# Patient Record
Sex: MALE | Race: WHITE | Employment: FULL TIME | URBAN - NONMETROPOLITAN AREA
[De-identification: names, ages, dates, MRNs, and addresses within clinical notes are randomized per-mention and may not be internally consistent; named-entity substitution may affect disease eponyms.]

---

## 2017-01-18 RX ORDER — HYDROCODONE BITARTRATE AND ACETAMINOPHEN 10; 325 MG/1; MG/1
1 TABLET ORAL EVERY 8 HOURS PRN
Qty: 90 TABLET | Refills: 0 | Status: SHIPPED | OUTPATIENT
Start: 2017-01-19 | End: 2017-02-17 | Stop reason: SDUPTHER

## 2017-02-17 ENCOUNTER — HOSPITAL ENCOUNTER (OUTPATIENT)
Dept: PAIN MANAGEMENT | Age: 52
Discharge: HOME | End: 2017-02-17
Payer: MEDICAID

## 2017-02-17 VITALS
HEIGHT: 68 IN | SYSTOLIC BLOOD PRESSURE: 150 MMHG | WEIGHT: 153 LBS | DIASTOLIC BLOOD PRESSURE: 81 MMHG | HEART RATE: 80 BPM | RESPIRATION RATE: 16 BRPM | TEMPERATURE: 98.1 F | BODY MASS INDEX: 23.19 KG/M2 | OXYGEN SATURATION: 97 %

## 2017-02-17 PROCEDURE — 99213 OFFICE O/P EST LOW 20 MIN: CPT

## 2017-02-17 PROCEDURE — 80307 DRUG TEST PRSMV CHEM ANLYZR: CPT

## 2017-02-17 RX ORDER — HYDROCODONE BITARTRATE AND ACETAMINOPHEN 10; 325 MG/1; MG/1
1 TABLET ORAL EVERY 8 HOURS PRN
Qty: 90 TABLET | Refills: 0 | Status: SHIPPED | OUTPATIENT
Start: 2017-02-18 | End: 2017-03-15 | Stop reason: SDUPTHER

## 2017-02-17 ASSESSMENT — PAIN DESCRIPTION - PAIN TYPE: TYPE: CHRONIC PAIN

## 2017-02-17 ASSESSMENT — PAIN DESCRIPTION - DESCRIPTORS: DESCRIPTORS: ACHING;CONSTANT;NUMBNESS;TINGLING

## 2017-02-17 ASSESSMENT — PAIN DESCRIPTION - PROGRESSION: CLINICAL_PROGRESSION: NOT CHANGED

## 2017-02-17 ASSESSMENT — ACTIVITIES OF DAILY LIVING (ADL): EFFECT OF PAIN ON DAILY ACTIVITIES: DAILY ACTIVITIES

## 2017-02-17 ASSESSMENT — PAIN DESCRIPTION - FREQUENCY: FREQUENCY: CONTINUOUS

## 2017-02-17 ASSESSMENT — PAIN DESCRIPTION - ONSET: ONSET: ON-GOING

## 2017-02-17 ASSESSMENT — PAIN DESCRIPTION - LOCATION: LOCATION: BACK;LEG

## 2017-02-17 ASSESSMENT — PAIN DESCRIPTION - DIRECTION: RADIATING_TOWARDS: RADIATES DOWN BILATERAL LEGS

## 2017-02-17 ASSESSMENT — PAIN DESCRIPTION - ORIENTATION: ORIENTATION: RIGHT;LEFT;LOWER

## 2017-02-17 ASSESSMENT — PAIN SCALES - GENERAL: PAINLEVEL_OUTOF10: 6

## 2017-03-08 LAB
AMPHETAMINES, URINE: NEGATIVE NG/ML
BARBITURATES, URINE: NEGATIVE NG/ML
BENZODIAZEPINES, URINE: NEGATIVE NG/ML
CANNABINOIDS, URINE: NEGATIVE NG/ML
COCAINE METABOLITE, URINE: NEGATIVE NG/ML
CODEINE, URINE CONFIRM: >3000 NG/ML
CODEINE, URINE: POSITIVE
CREATININE, URINE: 52.4 MG/DL (ref 20–300)
ETHANOL U, QUAN: NEGATIVE %
FENTANYL URINE: NEGATIVE ML
HYDROCODONE, URINE: NEGATIVE
HYDROMORPHONE, URINE: NEGATIVE
MEPERIDINE, UR: NEGATIVE NG/ML
METHADONE SCREEN, URINE: NEGATIVE NG/ML
MORPHINE URINE: POSITIVE
MORPHINE, URINE CONFIRMATION: 915 NG/ML
OPIATES, URINE: ABNORMAL NG/ML
OPIATES, URINE: POSITIVE NG/ML
OXYCODONE/OXYMORPHONE, UR: NEGATIVE NG/ML
PH, URINE: 5.9 (ref 4.5–8.9)
PHENCYCLIDINE, URINE: NEGATIVE NG/ML
PROPOXYPHENE, URINE: NEGATIVE NG/ML

## 2017-03-16 RX ORDER — HYDROCODONE BITARTRATE AND ACETAMINOPHEN 10; 325 MG/1; MG/1
1 TABLET ORAL EVERY 8 HOURS PRN
Qty: 90 TABLET | Refills: 0 | Status: SHIPPED | OUTPATIENT
Start: 2017-03-20 | End: 2017-04-22

## 2017-03-29 ENCOUNTER — OFFICE VISIT (OUTPATIENT)
Dept: FAMILY MEDICINE CLINIC | Facility: CLINIC | Age: 52
End: 2017-03-29

## 2017-03-29 VITALS
HEART RATE: 75 BPM | SYSTOLIC BLOOD PRESSURE: 128 MMHG | WEIGHT: 152 LBS | HEIGHT: 68 IN | RESPIRATION RATE: 16 BRPM | OXYGEN SATURATION: 97 % | DIASTOLIC BLOOD PRESSURE: 80 MMHG | BODY MASS INDEX: 23.04 KG/M2

## 2017-03-29 DIAGNOSIS — R13.10 DYSPHAGIA, UNSPECIFIED TYPE: ICD-10-CM

## 2017-03-29 DIAGNOSIS — K21.00 GASTROESOPHAGEAL REFLUX DISEASE WITH ESOPHAGITIS: Primary | ICD-10-CM

## 2017-03-29 PROCEDURE — 99213 OFFICE O/P EST LOW 20 MIN: CPT | Performed by: FAMILY MEDICINE

## 2017-03-29 RX ORDER — HYDROCODONE BITARTRATE AND ACETAMINOPHEN 10; 325 MG/1; MG/1
TABLET ORAL EVERY 8 HOURS
COMMUNITY
Start: 2017-03-20 | End: 2017-04-21

## 2017-03-29 RX ORDER — LANSOPRAZOLE 30 MG/1
CAPSULE, DELAYED RELEASE ORAL
COMMUNITY
End: 2017-03-29 | Stop reason: CLARIF

## 2017-03-29 RX ORDER — PANTOPRAZOLE SODIUM 40 MG/1
40 TABLET, DELAYED RELEASE ORAL DAILY
Qty: 30 TABLET | Refills: 2 | OUTPATIENT
Start: 2017-03-29 | End: 2017-10-12

## 2017-03-29 NOTE — PROGRESS NOTES
"Ashley Myers is a 51 y.o. male.     Chief Complaint   Patient presents with   • Heartburn     x 3 weeks       History of Present Illness     he notes heartburn with difficulty swallowing ..he does remember having endo wtih dilatiation in the past at [facility]..he is noting occ dysphagia--denies any anginal symptoms or dsypnea      Current Outpatient Prescriptions:   •  HYDROcodone-acetaminophen (NORCO)  MG per tablet, Take  by mouth Every 8 (Eight) Hours., Disp: , Rfl:   •  pantoprazole (PROTONIX) 40 MG EC tablet, Take 1 tablet by mouth Daily., Disp: 30 tablet, Rfl: 2  No Known Allergies    No past medical history on file.  No past surgical history on file.    Review of Systems   Constitutional: Negative.    HENT: Negative.    Eyes: Negative.    Respiratory: Negative.    Cardiovascular: Negative.    Gastrointestinal: Positive for abdominal pain.   Endocrine: Negative.    Genitourinary: Negative.    Musculoskeletal: Negative.    Skin: Negative.    Allergic/Immunologic: Negative.    Neurological: Negative.    Psychiatric/Behavioral: Negative.        Objective  /80  Pulse 75  Resp 16  Ht 68\" (172.7 cm)  Wt 152 lb (68.9 kg)  SpO2 97%  BMI 23.11 kg/m2  Physical Exam   Constitutional: He is oriented to person, place, and time. He appears well-developed and well-nourished.   HENT:   Head: Normocephalic and atraumatic.   Right Ear: External ear normal.   Left Ear: External ear normal.   Nose: Nose normal.   Mouth/Throat: Oropharynx is clear and moist.   Eyes: Conjunctivae and EOM are normal. Pupils are equal, round, and reactive to light.   Neck: Normal range of motion. Neck supple.   Cardiovascular: Normal rate, regular rhythm, normal heart sounds and intact distal pulses.    Pulmonary/Chest: Effort normal and breath sounds normal.   Abdominal: Soft. Bowel sounds are normal.   Musculoskeletal: Normal range of motion.   Neurological: He is alert and oriented to person, place, and time. He has " normal reflexes.   Skin: Skin is warm and dry.   Psychiatric: He has a normal mood and affect. His behavior is normal. Judgment and thought content normal.   Nursing note and vitals reviewed.      Assessment/Plan   Magalie was seen today for heartburn.    Diagnoses and all orders for this visit:    Gastroesophageal reflux disease with esophagitis  -     Ambulatory Referral to Gastroenterology    Dysphagia, unspecified type  -     Ambulatory Referral to Gastroenterology    Other orders  -     pantoprazole (PROTONIX) 40 MG EC tablet; Take 1 tablet by mouth Daily.          [facility]       Orders Placed This Encounter   Procedures   • Ambulatory Referral to Gastroenterology       Follow up:prn

## 2017-04-11 ENCOUNTER — TELEPHONE (OUTPATIENT)
Dept: PAIN MANAGEMENT | Age: 52
End: 2017-04-11

## 2017-04-15 ENCOUNTER — OFFICE VISIT (OUTPATIENT)
Dept: RETAIL CLINIC | Facility: CLINIC | Age: 52
End: 2017-04-15

## 2017-04-15 DIAGNOSIS — Z02.83 ENCOUNTER FOR DRUG SCREENING: Primary | ICD-10-CM

## 2017-04-15 NOTE — PROGRESS NOTES
Magalie Myers is a 51 y.o. male who presents to the clinic today for e-screen urine drug screen. See scanned [facility].

## 2017-04-21 ENCOUNTER — OFFICE VISIT (OUTPATIENT)
Dept: FAMILY MEDICINE CLINIC | Facility: CLINIC | Age: 52
End: 2017-04-21

## 2017-04-21 VITALS
OXYGEN SATURATION: 97 % | HEART RATE: 87 BPM | DIASTOLIC BLOOD PRESSURE: 82 MMHG | BODY MASS INDEX: 23.49 KG/M2 | WEIGHT: 155 LBS | SYSTOLIC BLOOD PRESSURE: 116 MMHG | HEIGHT: 68 IN | RESPIRATION RATE: 16 BRPM

## 2017-04-21 DIAGNOSIS — M54.31 SCIATICA OF RIGHT SIDE: ICD-10-CM

## 2017-04-21 DIAGNOSIS — M54.41 CHRONIC RIGHT-SIDED LOW BACK PAIN WITH RIGHT-SIDED SCIATICA: Primary | ICD-10-CM

## 2017-04-21 DIAGNOSIS — G89.29 CHRONIC RIGHT-SIDED LOW BACK PAIN WITH RIGHT-SIDED SCIATICA: Primary | ICD-10-CM

## 2017-04-21 PROBLEM — M54.40 CHRONIC RIGHT-SIDED LOW BACK PAIN WITH SCIATICA: Status: ACTIVE | Noted: 2017-04-21

## 2017-04-21 PROCEDURE — 99213 OFFICE O/P EST LOW 20 MIN: CPT | Performed by: FAMILY MEDICINE

## 2017-04-21 NOTE — PROGRESS NOTES
"Ashley Myers is a 51 y.o. male.     Chief Complaint   Patient presents with   • Back Pain     chronic back pain        History of Present Illness     Neisha has chronic low back pain and goes to dr hopson for pain managment for about 3 years...however in the 2-3mos his pain intensified and he would like to get surgical opinion..he notes pain going down the right leg      Current Outpatient Prescriptions:   •  pantoprazole (PROTONIX) 40 MG EC tablet, Take 1 tablet by mouth Daily., Disp: 30 tablet, Rfl: 2  No Known Allergies    No past medical history on file.  No past surgical history on file.    Review of Systems   Constitutional: Negative.    HENT: Negative.    Eyes: Negative.    Respiratory: Negative.    Cardiovascular: Negative.    Gastrointestinal: Negative.    Endocrine: Negative.    Genitourinary: Negative.    Musculoskeletal: Positive for back pain.   Skin: Negative.    Allergic/Immunologic: Negative.    Neurological: Negative.    Hematological: Negative.    Psychiatric/Behavioral: Negative.        Objective  /82  Pulse 87  Resp 16  Ht 68\" (172.7 cm)  Wt 155 lb (70.3 kg)  SpO2 97%  BMI 23.57 kg/m2  Physical Exam   Constitutional: He is oriented to person, place, and time. He appears well-developed and well-nourished.   HENT:   Head: Normocephalic and atraumatic.   Right Ear: External ear normal.   Left Ear: External ear normal.   Nose: Nose normal.   Mouth/Throat: Oropharynx is clear and moist.   Eyes: Conjunctivae and EOM are normal. Pupils are equal, round, and reactive to light.   Neck: Normal range of motion. Neck supple.   Cardiovascular: Normal rate, regular rhythm, normal heart sounds and intact distal pulses.    Pulmonary/Chest: Effort normal and breath sounds normal.   Abdominal: Soft. Bowel sounds are normal.   Musculoskeletal: Normal range of motion.   Neurological: He is alert and oriented to person, place, and time. He has normal reflexes.   Skin: Skin is warm and dry. "   Psychiatric: He has a normal mood and affect. His behavior is normal. Judgment and thought content normal.   Nursing note and vitals reviewed.      Assessment/Plan   Magalie was seen today for back pain.    Diagnoses and all orders for this visit:    Chronic right-sided low back pain with right-sided sciatica  -     Ambulatory Referral to Neurosurgery    Sciatica of right side  -     Ambulatory Referral to Neurosurgery      Continue with pain management for now           No orders of the defined types were placed in this encounter.      Follow up: prn

## 2017-04-22 ENCOUNTER — HOSPITAL ENCOUNTER (EMERGENCY)
Age: 52
Discharge: HOME | End: 2017-04-22
Payer: MEDICAID

## 2017-04-22 VITALS
OXYGEN SATURATION: 97 % | SYSTOLIC BLOOD PRESSURE: 149 MMHG | BODY MASS INDEX: 23.49 KG/M2 | HEART RATE: 78 BPM | HEIGHT: 68 IN | DIASTOLIC BLOOD PRESSURE: 100 MMHG | WEIGHT: 155 LBS | TEMPERATURE: 98.2 F | RESPIRATION RATE: 20 BRPM

## 2017-04-22 DIAGNOSIS — G89.29 CHRONIC BILATERAL LOW BACK PAIN WITH RIGHT-SIDED SCIATICA: Primary | ICD-10-CM

## 2017-04-22 DIAGNOSIS — M54.41 CHRONIC BILATERAL LOW BACK PAIN WITH RIGHT-SIDED SCIATICA: Primary | ICD-10-CM

## 2017-04-22 PROCEDURE — 99282 EMERGENCY DEPT VISIT SF MDM: CPT | Performed by: PHYSICIAN ASSISTANT

## 2017-04-22 PROCEDURE — 99282 EMERGENCY DEPT VISIT SF MDM: CPT

## 2017-04-22 RX ORDER — CYCLOBENZAPRINE HCL 10 MG
10 TABLET ORAL 3 TIMES DAILY PRN
Qty: 15 TABLET | Refills: 0 | Status: SHIPPED | OUTPATIENT
Start: 2017-04-22 | End: 2017-05-01

## 2017-04-22 RX ORDER — HYDROCODONE BITARTRATE AND ACETAMINOPHEN 5; 325 MG/1; MG/1
1 TABLET ORAL EVERY 6 HOURS PRN
Qty: 12 TABLET | Refills: 0 | Status: SHIPPED | OUTPATIENT
Start: 2017-04-22 | End: 2017-04-29

## 2017-04-22 RX ORDER — NAPROXEN 500 MG/1
500 TABLET ORAL 2 TIMES DAILY
Qty: 20 TABLET | Refills: 0 | Status: SHIPPED | OUTPATIENT
Start: 2017-04-22 | End: 2017-05-12 | Stop reason: ALTCHOICE

## 2017-04-22 RX ORDER — METHYLPREDNISOLONE 4 MG/1
TABLET ORAL
Qty: 1 KIT | Refills: 0 | Status: SHIPPED | OUTPATIENT
Start: 2017-04-22 | End: 2017-04-28

## 2017-04-22 ASSESSMENT — PAIN DESCRIPTION - PAIN TYPE: TYPE: CHRONIC PAIN

## 2017-04-22 ASSESSMENT — PAIN DESCRIPTION - LOCATION: LOCATION: BACK;OTHER (COMMENT)

## 2017-04-22 ASSESSMENT — PAIN SCALES - GENERAL: PAINLEVEL_OUTOF10: 8

## 2017-04-22 ASSESSMENT — PAIN DESCRIPTION - FREQUENCY: FREQUENCY: CONTINUOUS

## 2017-04-22 ASSESSMENT — PAIN DESCRIPTION - DESCRIPTORS: DESCRIPTORS: SHARP;SHOOTING

## 2017-04-24 ASSESSMENT — ENCOUNTER SYMPTOMS
DIARRHEA: 0
SHORTNESS OF BREATH: 0
NAUSEA: 0
BACK PAIN: 1
ABDOMINAL PAIN: 0
COUGH: 0
CONSTIPATION: 0
VOMITING: 0
WHEEZING: 0

## 2017-04-28 ENCOUNTER — TELEPHONE (OUTPATIENT)
Dept: PAIN MANAGEMENT | Age: 52
End: 2017-04-28

## 2017-05-01 ENCOUNTER — HOSPITAL ENCOUNTER (EMERGENCY)
Age: 52
Discharge: HOME | End: 2017-05-01
Payer: MEDICAID

## 2017-05-01 ENCOUNTER — APPOINTMENT (OUTPATIENT)
Dept: GENERAL RADIOLOGY | Age: 52
End: 2017-05-01
Payer: MEDICAID

## 2017-05-01 VITALS
HEART RATE: 73 BPM | RESPIRATION RATE: 18 BRPM | OXYGEN SATURATION: 99 % | DIASTOLIC BLOOD PRESSURE: 93 MMHG | WEIGHT: 155 LBS | BODY MASS INDEX: 23.49 KG/M2 | SYSTOLIC BLOOD PRESSURE: 144 MMHG | HEIGHT: 68 IN | TEMPERATURE: 98.6 F

## 2017-05-01 DIAGNOSIS — M54.41 BILATERAL LOW BACK PAIN WITH RIGHT-SIDED SCIATICA, UNSPECIFIED CHRONICITY: Primary | ICD-10-CM

## 2017-05-01 PROCEDURE — 72100 X-RAY EXAM L-S SPINE 2/3 VWS: CPT

## 2017-05-01 PROCEDURE — 96372 THER/PROPH/DIAG INJ SC/IM: CPT

## 2017-05-01 PROCEDURE — 6360000002 HC RX W HCPCS: Performed by: PHYSICIAN ASSISTANT

## 2017-05-01 PROCEDURE — 99283 EMERGENCY DEPT VISIT LOW MDM: CPT

## 2017-05-01 PROCEDURE — 99282 EMERGENCY DEPT VISIT SF MDM: CPT | Performed by: PHYSICIAN ASSISTANT

## 2017-05-01 RX ORDER — METHYLPREDNISOLONE SODIUM SUCCINATE 125 MG/2ML
125 INJECTION, POWDER, LYOPHILIZED, FOR SOLUTION INTRAMUSCULAR; INTRAVENOUS ONCE
Status: COMPLETED | OUTPATIENT
Start: 2017-05-01 | End: 2017-05-01

## 2017-05-01 RX ORDER — ORPHENADRINE CITRATE 30 MG/ML
60 INJECTION INTRAMUSCULAR; INTRAVENOUS ONCE
Status: COMPLETED | OUTPATIENT
Start: 2017-05-01 | End: 2017-05-01

## 2017-05-01 RX ORDER — CYCLOBENZAPRINE HCL 10 MG
10 TABLET ORAL 3 TIMES DAILY PRN
Qty: 15 TABLET | Refills: 0 | Status: SHIPPED | OUTPATIENT
Start: 2017-05-01 | End: 2017-05-11

## 2017-05-01 RX ORDER — HYDROCODONE BITARTRATE AND ACETAMINOPHEN 10; 325 MG/1; MG/1
1 TABLET ORAL 3 TIMES DAILY
Qty: 20 TABLET | Refills: 0 | Status: SHIPPED | OUTPATIENT
Start: 2017-05-01 | End: 2017-05-18

## 2017-05-01 RX ORDER — METHYLPREDNISOLONE 4 MG/1
TABLET ORAL
Qty: 1 KIT | Refills: 0 | Status: SHIPPED | OUTPATIENT
Start: 2017-05-01 | End: 2017-05-12 | Stop reason: ALTCHOICE

## 2017-05-01 RX ADMIN — ORPHENADRINE CITRATE 60 MG: 30 INJECTION INTRAMUSCULAR; INTRAVENOUS at 11:05

## 2017-05-01 RX ADMIN — METHYLPREDNISOLONE SODIUM SUCCINATE 125 MG: 125 INJECTION, POWDER, FOR SOLUTION INTRAMUSCULAR; INTRAVENOUS at 11:05

## 2017-05-01 ASSESSMENT — PAIN DESCRIPTION - LOCATION
LOCATION: BACK
LOCATION: BACK

## 2017-05-01 ASSESSMENT — PAIN SCALES - GENERAL
PAINLEVEL_OUTOF10: 8
PAINLEVEL_OUTOF10: 9

## 2017-05-01 ASSESSMENT — PAIN DESCRIPTION - PAIN TYPE: TYPE: ACUTE PAIN

## 2017-05-12 ENCOUNTER — HOSPITAL ENCOUNTER (EMERGENCY)
Age: 52
Discharge: HOME | End: 2017-05-12
Payer: MEDICAID

## 2017-05-12 VITALS
BODY MASS INDEX: 23.49 KG/M2 | HEART RATE: 72 BPM | HEIGHT: 68 IN | DIASTOLIC BLOOD PRESSURE: 78 MMHG | SYSTOLIC BLOOD PRESSURE: 138 MMHG | OXYGEN SATURATION: 99 % | RESPIRATION RATE: 20 BRPM | TEMPERATURE: 97 F | WEIGHT: 155 LBS

## 2017-05-12 DIAGNOSIS — G89.29 CHRONIC BACK PAIN GREATER THAN 3 MONTHS DURATION: Primary | ICD-10-CM

## 2017-05-12 DIAGNOSIS — M54.9 CHRONIC BACK PAIN GREATER THAN 3 MONTHS DURATION: Primary | ICD-10-CM

## 2017-05-12 PROCEDURE — 99282 EMERGENCY DEPT VISIT SF MDM: CPT | Performed by: NURSE PRACTITIONER

## 2017-05-12 PROCEDURE — 99282 EMERGENCY DEPT VISIT SF MDM: CPT

## 2017-05-12 RX ORDER — CYCLOBENZAPRINE HCL 10 MG
10 TABLET ORAL 3 TIMES DAILY PRN
Qty: 30 TABLET | Refills: 0 | Status: SHIPPED | OUTPATIENT
Start: 2017-05-12 | End: 2017-05-22

## 2017-05-12 RX ORDER — HYDROCODONE BITARTRATE AND ACETAMINOPHEN 5; 325 MG/1; MG/1
1 TABLET ORAL EVERY 6 HOURS PRN
Qty: 15 TABLET | Refills: 0 | Status: SHIPPED | OUTPATIENT
Start: 2017-05-12 | End: 2017-05-19

## 2017-05-12 RX ORDER — NAPROXEN 500 MG/1
500 TABLET ORAL 2 TIMES DAILY
Qty: 20 TABLET | Refills: 0 | Status: SHIPPED | OUTPATIENT
Start: 2017-05-12 | End: 2017-11-10

## 2017-05-12 RX ORDER — DEXTROSE MONOHYDRATE 25 G/50ML
INJECTION, SOLUTION INTRAVENOUS
Status: DISCONTINUED
Start: 2017-05-12 | End: 2017-05-12 | Stop reason: HOSPADM

## 2017-05-12 ASSESSMENT — PAIN SCALES - GENERAL: PAINLEVEL_OUTOF10: 9

## 2017-05-14 ASSESSMENT — ENCOUNTER SYMPTOMS: BACK PAIN: 1

## 2017-05-15 ENCOUNTER — HOSPITAL ENCOUNTER (EMERGENCY)
Facility: HOSPITAL | Age: 52
Discharge: HOME | End: 2017-05-15
Attending: EMERGENCY MEDICINE | Admitting: EMERGENCY MEDICINE

## 2017-05-15 VITALS
TEMPERATURE: 98 F | DIASTOLIC BLOOD PRESSURE: 85 MMHG | WEIGHT: 154.8 LBS | HEART RATE: 72 BPM | RESPIRATION RATE: 18 BRPM | SYSTOLIC BLOOD PRESSURE: 143 MMHG | BODY MASS INDEX: 23.46 KG/M2 | HEIGHT: 68 IN | OXYGEN SATURATION: 100 %

## 2017-05-15 DIAGNOSIS — G89.29 CHRONIC LOW BACK PAIN WITHOUT SCIATICA, UNSPECIFIED BACK PAIN LATERALITY: Primary | ICD-10-CM

## 2017-05-15 DIAGNOSIS — M54.50 CHRONIC LOW BACK PAIN WITHOUT SCIATICA, UNSPECIFIED BACK PAIN LATERALITY: Primary | ICD-10-CM

## 2017-05-15 PROCEDURE — 99282 EMERGENCY DEPT VISIT SF MDM: CPT

## 2017-05-15 RX ORDER — INDOMETHACIN 50 MG/1
50 CAPSULE ORAL
Qty: 30 CAPSULE | Refills: 0 | Status: SHIPPED | OUTPATIENT
Start: 2017-05-15 | End: 2017-05-18

## 2017-05-15 RX ORDER — METHYLPREDNISOLONE 4 MG/1
TABLET ORAL
Qty: 21 TABLET | Refills: 0 | OUTPATIENT
Start: 2017-05-15 | End: 2017-10-12

## 2017-05-18 ENCOUNTER — OFFICE VISIT (OUTPATIENT)
Dept: NEUROSURGERY | Facility: CLINIC | Age: 52
End: 2017-05-18

## 2017-05-18 VITALS
DIASTOLIC BLOOD PRESSURE: 100 MMHG | SYSTOLIC BLOOD PRESSURE: 158 MMHG | WEIGHT: 155 LBS | BODY MASS INDEX: 23.49 KG/M2 | HEIGHT: 68 IN

## 2017-05-18 DIAGNOSIS — IMO0001 NORMAL BODY MASS INDEX (BMI): ICD-10-CM

## 2017-05-18 DIAGNOSIS — M51.37 DEGENERATION OF LUMBAR OR LUMBOSACRAL INTERVERTEBRAL DISC: Primary | ICD-10-CM

## 2017-05-18 DIAGNOSIS — F17.200 SMOKER: ICD-10-CM

## 2017-05-18 PROBLEM — M51.379 DEGENERATION OF LUMBAR OR LUMBOSACRAL INTERVERTEBRAL DISC: Status: ACTIVE | Noted: 2017-05-18

## 2017-05-18 PROCEDURE — 99203 OFFICE O/P NEW LOW 30 MIN: CPT | Performed by: NURSE PRACTITIONER

## 2017-05-19 RX ORDER — OXYCODONE HYDROCHLORIDE AND ACETAMINOPHEN 5; 325 MG/1; MG/1
1 TABLET ORAL EVERY 8 HOURS PRN
Qty: 90 TABLET | Refills: 0 | Status: SHIPPED | OUTPATIENT
Start: 2017-05-19 | End: 2017-06-19

## 2017-05-22 RX ORDER — HYDROCODONE BITARTRATE AND ACETAMINOPHEN 7.5; 325 MG/1; MG/1
1 TABLET ORAL EVERY 6 HOURS PRN
Qty: 28 TABLET | Refills: 0 | Status: SHIPPED | OUTPATIENT
Start: 2017-05-22 | End: 2017-05-31 | Stop reason: SDUPTHER

## 2017-05-31 ENCOUNTER — HOSPITAL ENCOUNTER (EMERGENCY)
Age: 52
Discharge: HOME | End: 2017-05-31
Payer: MEDICAID

## 2017-05-31 VITALS
DIASTOLIC BLOOD PRESSURE: 91 MMHG | WEIGHT: 155 LBS | SYSTOLIC BLOOD PRESSURE: 139 MMHG | OXYGEN SATURATION: 99 % | TEMPERATURE: 98.2 F | BODY MASS INDEX: 23.49 KG/M2 | HEIGHT: 68 IN | HEART RATE: 81 BPM | RESPIRATION RATE: 18 BRPM

## 2017-05-31 DIAGNOSIS — Z76.0 ENCOUNTER FOR MEDICATION REFILL: ICD-10-CM

## 2017-05-31 DIAGNOSIS — G89.29 CHRONIC BACK PAIN GREATER THAN 3 MONTHS DURATION: Primary | ICD-10-CM

## 2017-05-31 DIAGNOSIS — M54.9 CHRONIC BACK PAIN GREATER THAN 3 MONTHS DURATION: Primary | ICD-10-CM

## 2017-05-31 PROCEDURE — 99282 EMERGENCY DEPT VISIT SF MDM: CPT | Performed by: NURSE PRACTITIONER

## 2017-05-31 PROCEDURE — 99282 EMERGENCY DEPT VISIT SF MDM: CPT

## 2017-05-31 RX ORDER — HYDROCODONE BITARTRATE AND ACETAMINOPHEN 7.5; 325 MG/1; MG/1
1 TABLET ORAL EVERY 8 HOURS PRN
Qty: 21 TABLET | Refills: 0 | Status: SHIPPED | OUTPATIENT
Start: 2017-05-31 | End: 2017-06-06 | Stop reason: SDUPTHER

## 2017-05-31 ASSESSMENT — PAIN DESCRIPTION - DESCRIPTORS: DESCRIPTORS: ACHING

## 2017-05-31 ASSESSMENT — PAIN SCALES - GENERAL: PAINLEVEL_OUTOF10: 9

## 2017-05-31 ASSESSMENT — PAIN DESCRIPTION - LOCATION: LOCATION: BACK

## 2017-05-31 ASSESSMENT — ENCOUNTER SYMPTOMS: BACK PAIN: 1

## 2017-05-31 ASSESSMENT — PAIN DESCRIPTION - PAIN TYPE: TYPE: ACUTE PAIN

## 2017-05-31 ASSESSMENT — PAIN DESCRIPTION - ORIENTATION: ORIENTATION: LOWER

## 2017-06-06 RX ORDER — HYDROCODONE BITARTRATE AND ACETAMINOPHEN 7.5; 325 MG/1; MG/1
1 TABLET ORAL EVERY 8 HOURS PRN
Qty: 21 TABLET | Refills: 0 | Status: SHIPPED | OUTPATIENT
Start: 2017-06-06 | End: 2017-06-13 | Stop reason: SDUPTHER

## 2017-06-08 ENCOUNTER — LAB (OUTPATIENT)
Dept: FAMILY MEDICINE CLINIC | Facility: CLINIC | Age: 52
End: 2017-06-08

## 2017-06-08 DIAGNOSIS — C61 PROSTATE CA (HCC): Primary | ICD-10-CM

## 2017-06-09 LAB
ALBUMIN SERPL-MCNC: 4.3 G/DL (ref 3.5–5.5)
ALBUMIN/GLOB SERPL: 1.6 {RATIO} (ref 1.2–2.2)
ALP SERPL-CCNC: 113 IU/L (ref 39–117)
ALT SERPL-CCNC: 22 IU/L (ref 0–44)
AST SERPL-CCNC: 20 IU/L (ref 0–40)
BASOPHILS # BLD AUTO: 0 X10E3/UL (ref 0–0.2)
BASOPHILS NFR BLD AUTO: 1 %
BILIRUB SERPL-MCNC: 0.2 MG/DL (ref 0–1.2)
BUN SERPL-MCNC: 5 MG/DL (ref 6–24)
BUN/CREAT SERPL: 7 (ref 9–20)
CALCIUM SERPL-MCNC: 9.3 MG/DL (ref 8.7–10.2)
CHLORIDE SERPL-SCNC: 94 MMOL/L (ref 96–106)
CO2 SERPL-SCNC: 27 MMOL/L (ref 18–29)
CREAT SERPL-MCNC: 0.69 MG/DL (ref 0.76–1.27)
EOSINOPHIL # BLD AUTO: 0.1 X10E3/UL (ref 0–0.4)
EOSINOPHIL NFR BLD AUTO: 1 %
ERYTHROCYTE [DISTWIDTH] IN BLOOD BY AUTOMATED COUNT: 14.2 % (ref 12.3–15.4)
GLOBULIN SER CALC-MCNC: 2.7 G/DL (ref 1.5–4.5)
GLUCOSE SERPL-MCNC: 73 MG/DL (ref 65–99)
HCT VFR BLD AUTO: 44 % (ref 37.5–51)
HGB BLD-MCNC: 15.2 G/DL (ref 12.6–17.7)
IMM GRANULOCYTES # BLD: 0 X10E3/UL (ref 0–0.1)
IMM GRANULOCYTES NFR BLD: 0 %
LYMPHOCYTES # BLD AUTO: 3.2 X10E3/UL (ref 0.7–3.1)
LYMPHOCYTES NFR BLD AUTO: 41 %
MCH RBC QN AUTO: 31.1 PG (ref 26.6–33)
MCHC RBC AUTO-ENTMCNC: 34.5 G/DL (ref 31.5–35.7)
MCV RBC AUTO: 90 FL (ref 79–97)
MONOCYTES # BLD AUTO: 0.5 X10E3/UL (ref 0.1–0.9)
MONOCYTES NFR BLD AUTO: 6 %
NEUTROPHILS # BLD AUTO: 4 X10E3/UL (ref 1.4–7)
NEUTROPHILS NFR BLD AUTO: 51 %
PLATELET # BLD AUTO: 231 X10E3/UL (ref 150–379)
POTASSIUM SERPL-SCNC: 3.9 MMOL/L (ref 3.5–5.2)
PROT SERPL-MCNC: 7 G/DL (ref 6–8.5)
PSA SERPL-MCNC: <0.1 NG/ML (ref 0–4)
RBC # BLD AUTO: 4.88 X10E6/UL (ref 4.14–5.8)
SODIUM SERPL-SCNC: 137 MMOL/L (ref 134–144)
WBC # BLD AUTO: 7.9 X10E3/UL (ref 3.4–10.8)

## 2017-06-13 RX ORDER — HYDROCODONE BITARTRATE AND ACETAMINOPHEN 7.5; 325 MG/1; MG/1
1 TABLET ORAL EVERY 8 HOURS PRN
Qty: 21 TABLET | Refills: 0 | Status: SHIPPED | OUTPATIENT
Start: 2017-06-13 | End: 2017-06-20 | Stop reason: SDUPTHER

## 2017-06-20 RX ORDER — HYDROCODONE BITARTRATE AND ACETAMINOPHEN 7.5; 325 MG/1; MG/1
1 TABLET ORAL EVERY 8 HOURS PRN
Qty: 21 TABLET | Refills: 0 | Status: SHIPPED | OUTPATIENT
Start: 2017-06-20 | End: 2017-06-27 | Stop reason: SDUPTHER

## 2017-06-27 RX ORDER — HYDROCODONE BITARTRATE AND ACETAMINOPHEN 7.5; 325 MG/1; MG/1
1 TABLET ORAL EVERY 8 HOURS PRN
Qty: 21 TABLET | Refills: 0 | Status: SHIPPED | OUTPATIENT
Start: 2017-06-27 | End: 2017-07-03 | Stop reason: SDUPTHER

## 2017-06-30 ENCOUNTER — LAB REQUISITION (OUTPATIENT)
Dept: LAB | Facility: HOSPITAL | Age: 52
End: 2017-06-30

## 2017-06-30 ENCOUNTER — APPOINTMENT (OUTPATIENT)
Dept: GENERAL RADIOLOGY | Facility: HOSPITAL | Age: 52
End: 2017-06-30

## 2017-06-30 ENCOUNTER — HOSPITAL ENCOUNTER (EMERGENCY)
Facility: HOSPITAL | Age: 52
Discharge: HOME | End: 2017-06-30
Attending: EMERGENCY MEDICINE | Admitting: EMERGENCY MEDICINE

## 2017-06-30 VITALS
RESPIRATION RATE: 18 BRPM | BODY MASS INDEX: 22.73 KG/M2 | SYSTOLIC BLOOD PRESSURE: 138 MMHG | WEIGHT: 150 LBS | OXYGEN SATURATION: 97 % | HEART RATE: 72 BPM | DIASTOLIC BLOOD PRESSURE: 82 MMHG | HEIGHT: 68 IN | TEMPERATURE: 97.7 F

## 2017-06-30 DIAGNOSIS — Z00.00 ENCOUNTER FOR GENERAL ADULT MEDICAL EXAMINATION WITHOUT ABNORMAL FINDINGS: ICD-10-CM

## 2017-06-30 DIAGNOSIS — S61.411A LACERATION OF RIGHT HAND, INITIAL ENCOUNTER: Primary | ICD-10-CM

## 2017-06-30 LAB — Lab: 1

## 2017-06-30 PROCEDURE — 82075 ASSAY OF BREATH ETHANOL: CPT | Performed by: EMERGENCY MEDICINE

## 2017-06-30 PROCEDURE — 99283 EMERGENCY DEPT VISIT LOW MDM: CPT

## 2017-06-30 PROCEDURE — 73130 X-RAY EXAM OF HAND: CPT

## 2017-07-02 ENCOUNTER — HOSPITAL ENCOUNTER (EMERGENCY)
Facility: HOSPITAL | Age: 52
Discharge: HOME | End: 2017-07-02
Admitting: EMERGENCY MEDICINE

## 2017-07-02 ENCOUNTER — APPOINTMENT (OUTPATIENT)
Dept: CT IMAGING | Facility: HOSPITAL | Age: 52
End: 2017-07-02

## 2017-07-02 VITALS
SYSTOLIC BLOOD PRESSURE: 127 MMHG | RESPIRATION RATE: 16 BRPM | BODY MASS INDEX: 22.88 KG/M2 | OXYGEN SATURATION: 98 % | TEMPERATURE: 98.9 F | HEART RATE: 65 BPM | HEIGHT: 68 IN | WEIGHT: 151 LBS | DIASTOLIC BLOOD PRESSURE: 73 MMHG

## 2017-07-02 DIAGNOSIS — K52.9 ENTERITIS: Primary | ICD-10-CM

## 2017-07-02 DIAGNOSIS — R93.89 ABNORMAL CT SCAN: ICD-10-CM

## 2017-07-02 LAB
ALBUMIN SERPL-MCNC: 4.3 G/DL (ref 3.5–5)
ALBUMIN/GLOB SERPL: 1.2 G/DL (ref 1.1–2.5)
ALP SERPL-CCNC: 108 U/L (ref 24–120)
ALT SERPL W P-5'-P-CCNC: 24 U/L (ref 0–54)
AMYLASE SERPL-CCNC: 57 U/L (ref 30–110)
ANION GAP SERPL CALCULATED.3IONS-SCNC: 10 MMOL/L (ref 4–13)
APTT PPP: 31 SECONDS (ref 24.1–34.8)
AST SERPL-CCNC: 27 U/L (ref 7–45)
BASOPHILS # BLD AUTO: 0.02 10*3/MM3 (ref 0–0.2)
BASOPHILS NFR BLD AUTO: 0.1 % (ref 0–2)
BILIRUB SERPL-MCNC: 0.8 MG/DL (ref 0.1–1)
BILIRUB UR QL STRIP: NEGATIVE
BUN BLD-MCNC: 9 MG/DL (ref 5–21)
BUN/CREAT SERPL: 12 (ref 7–25)
CALCIUM SPEC-SCNC: 9.2 MG/DL (ref 8.4–10.4)
CHLORIDE SERPL-SCNC: 97 MMOL/L (ref 98–110)
CLARITY UR: CLEAR
CO2 SERPL-SCNC: 28 MMOL/L (ref 24–31)
COLOR UR: YELLOW
CREAT BLD-MCNC: 0.75 MG/DL (ref 0.5–1.4)
CRP SERPL-MCNC: <0.5 MG/DL (ref 0–0.99)
D-LACTATE SERPL-SCNC: 1.3 MMOL/L (ref 0.5–2)
DEPRECATED RDW RBC AUTO: 42.9 FL (ref 40–54)
EOSINOPHIL # BLD AUTO: 0.04 10*3/MM3 (ref 0–0.7)
EOSINOPHIL NFR BLD AUTO: 0.2 % (ref 0–4)
ERYTHROCYTE [DISTWIDTH] IN BLOOD BY AUTOMATED COUNT: 13.2 % (ref 12–15)
GFR SERPL CREATININE-BSD FRML MDRD: 110 ML/MIN/1.73
GLOBULIN UR ELPH-MCNC: 3.5 GM/DL
GLUCOSE BLD-MCNC: 114 MG/DL (ref 70–100)
GLUCOSE UR STRIP-MCNC: NEGATIVE MG/DL
HCT VFR BLD AUTO: 45.9 % (ref 40–52)
HGB BLD-MCNC: 16.2 G/DL (ref 14–18)
HGB UR QL STRIP.AUTO: NEGATIVE
IMM GRANULOCYTES # BLD: 0.04 10*3/MM3 (ref 0–0.03)
IMM GRANULOCYTES NFR BLD: 0.2 % (ref 0–5)
INR PPP: 1.02 (ref 0.91–1.09)
KETONES UR QL STRIP: ABNORMAL
LEUKOCYTE ESTERASE UR QL STRIP.AUTO: NEGATIVE
LIPASE SERPL-CCNC: 30 U/L (ref 23–203)
LYMPHOCYTES # BLD AUTO: 1.85 10*3/MM3 (ref 0.72–4.86)
LYMPHOCYTES NFR BLD AUTO: 10.4 % (ref 15–45)
MCH RBC QN AUTO: 31.5 PG (ref 28–32)
MCHC RBC AUTO-ENTMCNC: 35.3 G/DL (ref 33–36)
MCV RBC AUTO: 89.1 FL (ref 82–95)
MONOCYTES # BLD AUTO: 0.54 10*3/MM3 (ref 0.19–1.3)
MONOCYTES NFR BLD AUTO: 3 % (ref 4–12)
NEUTROPHILS # BLD AUTO: 15.33 10*3/MM3 (ref 1.87–8.4)
NEUTROPHILS NFR BLD AUTO: 86.1 % (ref 39–78)
NITRITE UR QL STRIP: NEGATIVE
PH UR STRIP.AUTO: 7.5 [PH] (ref 5–8)
PLATELET # BLD AUTO: 240 10*3/MM3 (ref 130–400)
PMV BLD AUTO: 10.8 FL (ref 6–12)
POTASSIUM BLD-SCNC: 3.5 MMOL/L (ref 3.5–5.3)
PROT SERPL-MCNC: 7.8 G/DL (ref 6.3–8.7)
PROT UR QL STRIP: NEGATIVE
PROTHROMBIN TIME: 13.7 SECONDS (ref 11.9–14.6)
RBC # BLD AUTO: 5.15 10*6/MM3 (ref 4.8–5.9)
SODIUM BLD-SCNC: 135 MMOL/L (ref 135–145)
SP GR UR STRIP: 1.01 (ref 1–1.03)
UROBILINOGEN UR QL STRIP: ABNORMAL
WBC NRBC COR # BLD: 17.82 10*3/MM3 (ref 4.8–10.8)

## 2017-07-02 PROCEDURE — 80053 COMPREHEN METABOLIC PANEL: CPT | Performed by: NURSE PRACTITIONER

## 2017-07-02 PROCEDURE — 83690 ASSAY OF LIPASE: CPT | Performed by: NURSE PRACTITIONER

## 2017-07-02 PROCEDURE — 85730 THROMBOPLASTIN TIME PARTIAL: CPT | Performed by: NURSE PRACTITIONER

## 2017-07-02 PROCEDURE — 83605 ASSAY OF LACTIC ACID: CPT | Performed by: NURSE PRACTITIONER

## 2017-07-02 PROCEDURE — 74177 CT ABD & PELVIS W/CONTRAST: CPT

## 2017-07-02 PROCEDURE — 85025 COMPLETE CBC W/AUTO DIFF WBC: CPT | Performed by: NURSE PRACTITIONER

## 2017-07-02 PROCEDURE — 93005 ELECTROCARDIOGRAM TRACING: CPT | Performed by: NURSE PRACTITIONER

## 2017-07-02 PROCEDURE — 96374 THER/PROPH/DIAG INJ IV PUSH: CPT

## 2017-07-02 PROCEDURE — 99284 EMERGENCY DEPT VISIT MOD MDM: CPT

## 2017-07-02 PROCEDURE — 0 IOPAMIDOL 61 % SOLUTION: Performed by: NURSE PRACTITIONER

## 2017-07-02 PROCEDURE — 85610 PROTHROMBIN TIME: CPT | Performed by: NURSE PRACTITIONER

## 2017-07-02 PROCEDURE — 81003 URINALYSIS AUTO W/O SCOPE: CPT | Performed by: NURSE PRACTITIONER

## 2017-07-02 PROCEDURE — 96361 HYDRATE IV INFUSION ADD-ON: CPT

## 2017-07-02 PROCEDURE — 93010 ELECTROCARDIOGRAM REPORT: CPT | Performed by: INTERNAL MEDICINE

## 2017-07-02 PROCEDURE — 25010000002 ONDANSETRON PER 1 MG: Performed by: NURSE PRACTITIONER

## 2017-07-02 PROCEDURE — 82150 ASSAY OF AMYLASE: CPT | Performed by: NURSE PRACTITIONER

## 2017-07-02 PROCEDURE — 96375 TX/PRO/DX INJ NEW DRUG ADDON: CPT

## 2017-07-02 PROCEDURE — 86140 C-REACTIVE PROTEIN: CPT | Performed by: NURSE PRACTITIONER

## 2017-07-02 PROCEDURE — 25010000002 HYDROMORPHONE PER 4 MG: Performed by: NURSE PRACTITIONER

## 2017-07-02 RX ORDER — ONDANSETRON 4 MG/1
4 TABLET, ORALLY DISINTEGRATING ORAL EVERY 6 HOURS PRN
Qty: 12 TABLET | Refills: 0 | Status: SHIPPED | OUTPATIENT
Start: 2017-07-02 | End: 2017-07-05

## 2017-07-02 RX ORDER — ONDANSETRON 2 MG/ML
4 INJECTION INTRAMUSCULAR; INTRAVENOUS ONCE
Status: COMPLETED | OUTPATIENT
Start: 2017-07-02 | End: 2017-07-02

## 2017-07-02 RX ORDER — METRONIDAZOLE 500 MG/1
500 TABLET ORAL 3 TIMES DAILY
Qty: 21 TABLET | Refills: 0 | Status: SHIPPED | OUTPATIENT
Start: 2017-07-02 | End: 2017-07-09

## 2017-07-02 RX ORDER — SODIUM CHLORIDE 0.9 % (FLUSH) 0.9 %
10 SYRINGE (ML) INJECTION AS NEEDED
Status: DISCONTINUED | OUTPATIENT
Start: 2017-07-02 | End: 2017-07-02 | Stop reason: HOSPADM

## 2017-07-02 RX ADMIN — IOPAMIDOL 100 ML: 612 INJECTION, SOLUTION INTRAVENOUS at 18:04

## 2017-07-02 RX ADMIN — SODIUM CHLORIDE 1000 ML: 9 INJECTION, SOLUTION INTRAVENOUS at 15:36

## 2017-07-02 RX ADMIN — ONDANSETRON 4 MG: 2 INJECTION INTRAMUSCULAR; INTRAVENOUS at 15:36

## 2017-07-02 RX ADMIN — HYDROMORPHONE HYDROCHLORIDE 1 MG: 1 INJECTION, SOLUTION INTRAMUSCULAR; INTRAVENOUS; SUBCUTANEOUS at 15:36
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
## 2017-07-03 RX ORDER — HYDROCODONE BITARTRATE AND ACETAMINOPHEN 7.5; 325 MG/1; MG/1
1 TABLET ORAL EVERY 8 HOURS PRN
Qty: 21 TABLET | Refills: 0 | Status: SHIPPED | OUTPATIENT
Start: 2017-07-03 | End: 2017-07-10 | Stop reason: SDUPTHER

## 2017-07-03 NOTE — DISCHARGE INSTRUCTIONS
Please call and f/u with Dr. Robertson tomorrow  You will need follow up for the hepatic lesion and the adrenal nodule noted on the CT scan  Use medications as prescribed  R/t to the ER as needed

## 2017-07-10 ENCOUNTER — CLINICAL SUPPORT (OUTPATIENT)
Dept: FAMILY MEDICINE CLINIC | Facility: CLINIC | Age: 52
End: 2017-07-10

## 2017-07-10 DIAGNOSIS — IMO0002 LACERATION: Primary | ICD-10-CM

## 2017-07-10 PROCEDURE — 99211 OFF/OP EST MAY X REQ PHY/QHP: CPT | Performed by: FAMILY MEDICINE

## 2017-07-10 RX ORDER — HYDROCODONE BITARTRATE AND ACETAMINOPHEN 7.5; 325 MG/1; MG/1
1 TABLET ORAL EVERY 8 HOURS PRN
Qty: 21 TABLET | Refills: 0 | Status: SHIPPED | OUTPATIENT
Start: 2017-07-10 | End: 2017-07-17 | Stop reason: SDUPTHER

## 2017-07-17 RX ORDER — HYDROCODONE BITARTRATE AND ACETAMINOPHEN 7.5; 325 MG/1; MG/1
1 TABLET ORAL EVERY 8 HOURS PRN
Qty: 21 TABLET | Refills: 0 | OUTPATIENT
Start: 2017-07-17 | End: 2017-10-12

## 2017-07-17 NOTE — TELEPHONE ENCOUNTER
Let him know if he doesn't show for appt on august 15th we can no longer help him--even if dr dan reschedules the appt we will not be responsible for any more pain meds after this date

## 2017-07-17 NOTE — TELEPHONE ENCOUNTER
His appt with Dr. Leonardo is Aug 15th, but he has canceled or no showed to his appts in the past.

## 2017-07-17 NOTE — TELEPHONE ENCOUNTER
He said that he had to work, but he works Monday thru Friday and is able to come  his rx here at this office.

## 2017-07-17 NOTE — TELEPHONE ENCOUNTER
Magalie is requesting to p/u his refill of norco @ 10:00 today.   You said previously that you were not going to continue to fill his rx because he did not keep his appt with Dr. Leonardo.   What would you like me to tell Mr. Myers?

## 2017-08-14 ENCOUNTER — TELEPHONE (OUTPATIENT)
Dept: NEUROSURGERY | Facility: CLINIC | Age: 52
End: 2017-08-14

## 2017-08-14 NOTE — TELEPHONE ENCOUNTER
"Patient has an appt w/Dr Leonardo tomorrow for follow up of his PT.  I do not see any therapy documentation.  I tried to reach the patient by phone but he did not answer and there was no voicemail set up ([employer] unable to reach him too).  I called the emergency contact which is his mother and she said the patient would be \"unable to keep his appt tomorrow\".  She will let him know that I called and that when he is able to set up his therapy he can call us back and r/s the appt.    romain perrin CMA  "

## 2017-10-09 ENCOUNTER — HOSPITAL ENCOUNTER (EMERGENCY)
Facility: HOSPITAL | Age: 52
Discharge: HOME | End: 2017-10-09
Attending: EMERGENCY MEDICINE | Admitting: EMERGENCY MEDICINE

## 2017-10-09 VITALS
SYSTOLIC BLOOD PRESSURE: 136 MMHG | DIASTOLIC BLOOD PRESSURE: 78 MMHG | RESPIRATION RATE: 17 BRPM | HEART RATE: 76 BPM | TEMPERATURE: 98.6 F | HEIGHT: 68 IN | WEIGHT: 150 LBS | BODY MASS INDEX: 22.73 KG/M2 | OXYGEN SATURATION: 99 %

## 2017-10-09 DIAGNOSIS — K21.00 GASTROESOPHAGEAL REFLUX DISEASE WITH ESOPHAGITIS: ICD-10-CM

## 2017-10-09 DIAGNOSIS — R07.9 CHEST PAIN, UNSPECIFIED TYPE: Primary | ICD-10-CM

## 2017-10-09 LAB
ALBUMIN SERPL-MCNC: 4.3 G/DL (ref 3.5–5)
ALBUMIN/GLOB SERPL: 1.3 G/DL (ref 1.1–2.5)
ALP SERPL-CCNC: 107 U/L (ref 24–120)
ALT SERPL W P-5'-P-CCNC: 40 U/L (ref 0–54)
ANION GAP SERPL CALCULATED.3IONS-SCNC: 12 MMOL/L (ref 4–13)
APTT PPP: 31 SECONDS (ref 24.1–34.8)
AST SERPL-CCNC: 25 U/L (ref 7–45)
BASOPHILS # BLD AUTO: 0.03 10*3/MM3 (ref 0–0.2)
BASOPHILS NFR BLD AUTO: 0.3 % (ref 0–2)
BILIRUB SERPL-MCNC: 0.3 MG/DL (ref 0.1–1)
BUN BLD-MCNC: 6 MG/DL (ref 5–21)
BUN/CREAT SERPL: 9.5 (ref 7–25)
CALCIUM SPEC-SCNC: 8.9 MG/DL (ref 8.4–10.4)
CHLORIDE SERPL-SCNC: 98 MMOL/L (ref 98–110)
CO2 SERPL-SCNC: 26 MMOL/L (ref 24–31)
CREAT BLD-MCNC: 0.63 MG/DL (ref 0.5–1.4)
D DIMER PPP FEU-MCNC: 0.35 MG/L (FEU) (ref 0–0.5)
DEPRECATED RDW RBC AUTO: 41.1 FL (ref 40–54)
EOSINOPHIL # BLD AUTO: 0.11 10*3/MM3 (ref 0–0.7)
EOSINOPHIL NFR BLD AUTO: 1.2 % (ref 0–4)
ERYTHROCYTE [DISTWIDTH] IN BLOOD BY AUTOMATED COUNT: 12.8 % (ref 12–15)
GFR SERPL CREATININE-BSD FRML MDRD: 134 ML/MIN/1.73
GLOBULIN UR ELPH-MCNC: 3.3 GM/DL
GLUCOSE BLD-MCNC: 109 MG/DL (ref 70–100)
HCT VFR BLD AUTO: 43.7 % (ref 40–52)
HGB BLD-MCNC: 15.7 G/DL (ref 14–18)
HOLD SPECIMEN: NORMAL
HOLD SPECIMEN: NORMAL
IMM GRANULOCYTES # BLD: 0.02 10*3/MM3 (ref 0–0.03)
IMM GRANULOCYTES NFR BLD: 0.2 % (ref 0–5)
INR PPP: 0.84 (ref 0.91–1.09)
LYMPHOCYTES # BLD AUTO: 3.78 10*3/MM3 (ref 0.72–4.86)
LYMPHOCYTES NFR BLD AUTO: 39.8 % (ref 15–45)
MCH RBC QN AUTO: 31.7 PG (ref 28–32)
MCHC RBC AUTO-ENTMCNC: 35.9 G/DL (ref 33–36)
MCV RBC AUTO: 88.3 FL (ref 82–95)
MONOCYTES # BLD AUTO: 0.58 10*3/MM3 (ref 0.19–1.3)
MONOCYTES NFR BLD AUTO: 6.1 % (ref 4–12)
NEUTROPHILS # BLD AUTO: 4.97 10*3/MM3 (ref 1.87–8.4)
NEUTROPHILS NFR BLD AUTO: 52.4 % (ref 39–78)
NT-PROBNP SERPL-MCNC: 51.8 PG/ML (ref 0–900)
PLATELET # BLD AUTO: 213 10*3/MM3 (ref 130–400)
PMV BLD AUTO: 10.8 FL (ref 6–12)
POTASSIUM BLD-SCNC: 3.4 MMOL/L (ref 3.5–5.3)
PROT SERPL-MCNC: 7.6 G/DL (ref 6.3–8.7)
PROTHROMBIN TIME: 11.7 SECONDS (ref 11.9–14.6)
RBC # BLD AUTO: 4.95 10*6/MM3 (ref 4.8–5.9)
SODIUM BLD-SCNC: 136 MMOL/L (ref 135–145)
TROPONIN I SERPL-MCNC: <0.012 NG/ML (ref 0–0.03)
WBC NRBC COR # BLD: 9.49 10*3/MM3 (ref 4.8–10.8)
WHOLE BLOOD HOLD SPECIMEN: NORMAL
WHOLE BLOOD HOLD SPECIMEN: NORMAL

## 2017-10-09 PROCEDURE — 84484 ASSAY OF TROPONIN QUANT: CPT | Performed by: EMERGENCY MEDICINE

## 2017-10-09 PROCEDURE — 85730 THROMBOPLASTIN TIME PARTIAL: CPT | Performed by: EMERGENCY MEDICINE

## 2017-10-09 PROCEDURE — 93005 ELECTROCARDIOGRAM TRACING: CPT

## 2017-10-09 PROCEDURE — 85610 PROTHROMBIN TIME: CPT | Performed by: EMERGENCY MEDICINE

## 2017-10-09 PROCEDURE — 99284 EMERGENCY DEPT VISIT MOD MDM: CPT

## 2017-10-09 PROCEDURE — 93010 ELECTROCARDIOGRAM REPORT: CPT | Performed by: INTERNAL MEDICINE

## 2017-10-09 PROCEDURE — 85379 FIBRIN DEGRADATION QUANT: CPT | Performed by: EMERGENCY MEDICINE

## 2017-10-09 PROCEDURE — 85025 COMPLETE CBC W/AUTO DIFF WBC: CPT | Performed by: EMERGENCY MEDICINE

## 2017-10-09 PROCEDURE — 80053 COMPREHEN METABOLIC PANEL: CPT | Performed by: EMERGENCY MEDICINE

## 2017-10-09 PROCEDURE — 83880 ASSAY OF NATRIURETIC PEPTIDE: CPT | Performed by: EMERGENCY MEDICINE

## 2017-10-09 RX ORDER — ALUMINA, MAGNESIA, AND SIMETHICONE 2400; 2400; 240 MG/30ML; MG/30ML; MG/30ML
15 SUSPENSION ORAL ONCE
Status: COMPLETED | OUTPATIENT
Start: 2017-10-09 | End: 2017-10-09

## 2017-10-09 RX ORDER — OMEPRAZOLE 20 MG/1
20 CAPSULE, DELAYED RELEASE ORAL 2 TIMES DAILY
Qty: 30 CAPSULE | Refills: 0 | Status: SHIPPED | OUTPATIENT
Start: 2017-10-09 | End: 2017-10-12

## 2017-10-09 RX ADMIN — LIDOCAINE HYDROCHLORIDE 15 ML: 20 SOLUTION ORAL; TOPICAL at 15:22

## 2017-10-09 RX ADMIN — ALUMINUM HYDROXIDE, MAGNESIUM HYDROXIDE, AND DIMETHICONE 15 ML: 400; 400; 40 SUSPENSION ORAL at 15:22

## 2017-10-09 NOTE — ED PROVIDER NOTES
Subjective   HPI Comments: Patient c/o sudden onset of chest pain while sitting on his sister's couch.  Describes it as tightness and then had some tightness in left arm.  All started before lunch.  Better now though not completely gone.  Has been to ED many times in past with chest pain with negative evaluation.  Had stress test that was negative but that was many years ago.  Also has know GERD and has had to have food blockages removed but never followed up to get esophagus stretched.  Has been having lot of indigestion recently.    Patient is a 52 y.o. male presenting with chest pain.   History provided by:  Patient, spouse and relative   used: No    Chest Pain   Pain location:  Substernal area  Pain quality: tightness    Pain radiates to:  L shoulder and L arm  Pain severity:  Moderate  Onset quality:  Sudden  Duration:  3 hours  Timing:  Constant  Progression:  Partially resolved  Chronicity:  Recurrent  Context: not breathing, not drug use, not eating, not intercourse, not lifting, not movement, not raising an arm, not at rest, not stress and not trauma    Relieved by:  Nothing  Worsened by:  Nothing  Ineffective treatments:  None tried  Associated symptoms: heartburn    Associated symptoms: no abdominal pain, no AICD problem, no altered mental status, no anorexia, no anxiety, no back pain, no claudication, no cough, no diaphoresis, no dizziness, no dysphagia, no fatigue, no fever, no headache, no lower extremity edema, no nausea, no near-syncope, no numbness, no orthopnea, no palpitations, no PND, no shortness of breath, no syncope, no vomiting and no weakness    Risk factors: male sex and smoking    Risk factors: no aortic disease, no birth control, no coronary artery disease, no diabetes mellitus, no Jamey-Danlos syndrome, no high cholesterol, no hypertension, no immobilization, no Marfan's syndrome, not obese, not pregnant, no prior DVT/PE and no surgery        Review of Systems    Constitutional: Negative.  Negative for diaphoresis, fatigue and fever.   HENT: Negative.  Negative for trouble swallowing.    Respiratory: Negative.  Negative for cough and shortness of breath.    Cardiovascular: Positive for chest pain. Negative for palpitations, orthopnea, claudication, syncope, PND and near-syncope.   Gastrointestinal: Positive for heartburn. Negative for abdominal pain, anorexia, nausea and vomiting.   Genitourinary: Negative.    Musculoskeletal: Negative.  Negative for back pain.   Skin: Negative.    Neurological: Negative.  Negative for dizziness, weakness, numbness and headaches.   Hematological: Negative.    Psychiatric/Behavioral: Negative.    All other systems reviewed and are negative.      Past Medical History:   Diagnosis Date   • GERD (gastroesophageal reflux disease)    • Injury of back    • Prostate CA        No Known Allergies    Past Surgical History:   Procedure Laterality Date   • BACK SURGERY      Patient has had 2 previous back surgeries.    • PROSTATE SURGERY         Family History   Problem Relation Age of Onset   • Arthritis Mother    • Heart disease Father    • Cancer Father    • No Known Problems Sister    • No Known Problems Brother    • No Known Problems Brother        Social History     Social History   • Marital status:      Spouse name: N/A   • Number of children: N/A   • Years of education: N/A     Social History Main Topics   • Smoking status: Current Every Day Smoker     Packs/day: 1.00     Types: Cigarettes   • Smokeless tobacco: None   • Alcohol use No   • Drug use: No   • Sexual activity: Not Asked     Other Topics Concern   • None     Social History Narrative       Prior to Admission medications    Medication Sig Start Date End Date Taking? Authorizing Provider   IBUPROFEN PO Take  by mouth As Needed.   Yes Historical Provider, MD   HYDROcodone-acetaminophen (NORCO) 7.5-325 MG per tablet Take 1 tablet by mouth Every 8 (Eight) Hours As Needed for  Moderate Pain . 7/17/17   Lawrence Robertson MD   MethylPREDNISolone (MEDROL, LAMIN,) 4 MG tablet Take as directed on package instructions. 5/15/17   Lawrence Wu Jr., MD   omeprazole (priLOSEC) 20 MG capsule Take 1 capsule by mouth 2 (Two) Times a Day. 10/9/17   Lawrence Wu Jr., MD   pantoprazole (PROTONIX) 40 MG EC tablet Take 1 tablet by mouth Daily. 3/29/17   Lawrence Robertson MD       Medications   aluminum-magnesium hydroxide-simethicone (MAALOX MAX) 400-400-40 MG/5ML suspension 15 mL (15 mL Oral Given 10/9/17 1522)   lidocaine viscous (XYLOCAINE) 2 % mouth solution 15 mL (15 mL Mouth/Throat Given 10/9/17 1522)       Vitals:    10/09/17 1609   BP: 136/78   Pulse: 76   Resp: 17   Temp: 98.6 °F (37 °C)   SpO2: 99%         Objective   Physical Exam   Constitutional: He is oriented to person, place, and time. He appears well-developed and well-nourished.   HENT:   Head: Normocephalic and atraumatic.   Mouth/Throat: Oropharynx is clear and moist.   Eyes: EOM are normal. Pupils are equal, round, and reactive to light.   Neck: Normal range of motion. Neck supple.   Cardiovascular: Normal rate and regular rhythm.    Pulmonary/Chest: Effort normal and breath sounds normal.   Abdominal: Soft. Bowel sounds are normal.   Musculoskeletal: Normal range of motion.   Neurological: He is alert and oriented to person, place, and time.   Skin: Skin is warm.   Psychiatric: He has a normal mood and affect. His behavior is normal.   Nursing note and vitals reviewed.      Procedures         Lab Results (last 24 hours)     Procedure Component Value Units Date/Time    Protime-INR [[id number]]  (Abnormal) Collected:  10/09/17 1431    Specimen:  Blood from Arm, Left Updated:  10/09/17 1454     Protime 11.7 (L) Seconds      INR 0.84 (L)    aPTT [[id number]]  (Normal) Collected:  10/09/17 1431    Specimen:  Blood from Arm, Left Updated:  10/09/17 1454     PTT 31.0 seconds     D-dimer, Quantitative [[id number]]  (Normal)  Collected:  10/09/17 1431    Specimen:  Blood from Arm, Left Updated:  10/09/17 1454     D-Dimer, Quantitative 0.35 mg/L (FEU)     Narrative:       Reference Range is 0-0.50 mg/L FEU. However, results <0.50 mg/L FEU tends to rule out DVT or PE. Results >0.50 mg/L FEU are not useful in predicting absence or presence of DVT or PE.    CBC & Differential [[id number]] Collected:  10/09/17 1432    Specimen:  Blood Updated:  10/09/17 1444    Narrative:       The following orders were created for panel order CBC & Differential.  Procedure                               Abnormality         Status                     ---------                               -----------         ------                     CBC Auto Differential[[id number]]        Normal              Final result                 Please view results for these tests on the individual orders.    Comprehensive Metabolic Panel [[id number]]  (Abnormal) Collected:  10/09/17 1432    Specimen:  Blood from Arm, Left Updated:  10/09/17 1505     Glucose 109 (H) mg/dL      BUN 6 mg/dL      Creatinine 0.63 mg/dL      Sodium 136 mmol/L      Potassium 3.4 (L) mmol/L      Chloride 98 mmol/L      CO2 26.0 mmol/L      Calcium 8.9 mg/dL      Total Protein 7.6 g/dL      Albumin 4.30 g/dL      ALT (SGPT) 40 U/L      AST (SGOT) 25 U/L      Alkaline Phosphatase 107 U/L      Total Bilirubin 0.3 mg/dL      eGFR Non African Amer 134 mL/min/1.73      Globulin 3.3 gm/dL      A/G Ratio 1.3 g/dL      BUN/Creatinine Ratio 9.5     Anion Gap 12.0 mmol/L     BNP [[id number]]  (Normal) Collected:  10/09/17 1432    Specimen:  Blood from Arm, Left Updated:  10/09/17 1515     proBNP 51.8 pg/mL     Troponin [[id number]]  (Normal) Collected:  10/09/17 1432    Specimen:  Blood from Arm, Left Updated:  10/09/17 1515     Troponin I <0.012 ng/mL     CBC Auto Differential [[id number]]  (Normal) Collected:  10/09/17 1432    Specimen:  Blood from Arm, Left Updated:  10/09/17 1444     WBC 9.49 10*3/mm3      RBC 4.95  10*6/mm3      Hemoglobin 15.7 g/dL      Hematocrit 43.7 %      MCV 88.3 fL      MCH 31.7 pg      MCHC 35.9 g/dL      RDW 12.8 %      RDW-SD 41.1 fl      MPV 10.8 fL      Platelets 213 10*3/mm3      Neutrophil % 52.4 %      Lymphocyte % 39.8 %      Monocyte % 6.1 %      Eosinophil % 1.2 %      Basophil % 0.3 %      Immature Grans % 0.2 %      Neutrophils, Absolute 4.97 10*3/mm3      Lymphocytes, Absolute 3.78 10*3/mm3      Monocytes, Absolute 0.58 10*3/mm3      Eosinophils, Absolute 0.11 10*3/mm3      Basophils, Absolute 0.03 10*3/mm3      Immature Grans, Absolute 0.02 10*3/mm3           No orders to display       ED Course  ED Course   Comment By Time   Told patient his testing here is negative and this does not sound cardiac but stressed to him and family that I could not be 100 % certain.  Offered to keep for second set and then overnight and get stress test in AM but he would rather do it as OP.  Told them this is probably esophageal but rule out heart first and then go from there. Lawrence Wu Jr., MD 10/09 1353     HEART Score  History: Slightly suspicious (+0)  ECG: Normal (+0)  Age: 45 through 65 (+1)  Risk Factors: 1 - 2 risk factors (+1)  Troponin: Normal limit or lower (+0)  Total: 2        MDM  Number of Diagnoses or Management Options  Chest pain, unspecified type: new and requires workup  Gastroesophageal reflux disease with esophagitis: new and requires workup     Amount and/or Complexity of Data Reviewed  Clinical lab tests: ordered and reviewed  Tests in the radiology section of CPT®: reviewed and ordered  Tests in the medicine section of CPT®: reviewed and ordered    Risk of Complications, Morbidity, and/or Mortality  Presenting problems: moderate  Diagnostic procedures: moderate  Management options: moderate    Patient Progress  Patient progress: stable      Final diagnoses:   Chest pain, unspecified type   Gastroesophageal reflux disease with esophagitis          Lawrence Wu Jr.,  MD  10/09/17 2153

## 2017-10-10 ENCOUNTER — TRANSCRIBE ORDERS (OUTPATIENT)
Dept: ADMINISTRATIVE | Facility: HOSPITAL | Age: 52
End: 2017-10-10

## 2017-10-10 ENCOUNTER — TELEPHONE (OUTPATIENT)
Dept: FAMILY MEDICINE CLINIC | Facility: CLINIC | Age: 52
End: 2017-10-10

## 2017-10-10 DIAGNOSIS — R07.9 CHEST PAIN, UNSPECIFIED TYPE: Primary | ICD-10-CM

## 2017-10-10 NOTE — TELEPHONE ENCOUNTER
Pt called he has been in senior care for the last 2 months and he now is out and is needing rf on his norco 7.5/325?? Pt has no phone so he will call back this afternoon with a response

## 2017-10-11 ENCOUNTER — DOCUMENTATION (OUTPATIENT)
Dept: FAMILY MEDICINE CLINIC | Facility: CLINIC | Age: 52
End: 2017-10-11

## 2017-10-11 ENCOUNTER — HOSPITAL ENCOUNTER (OUTPATIENT)
Dept: CARDIOLOGY | Facility: HOSPITAL | Age: 52
Discharge: HOME | End: 2017-10-11
Attending: EMERGENCY MEDICINE | Admitting: EMERGENCY MEDICINE

## 2017-10-11 ENCOUNTER — TELEPHONE (OUTPATIENT)
Dept: FAMILY MEDICINE CLINIC | Facility: CLINIC | Age: 52
End: 2017-10-11

## 2017-10-11 DIAGNOSIS — R07.9 CHEST PAIN, UNSPECIFIED TYPE: ICD-10-CM

## 2017-10-11 LAB
BH CV ECHO MEAS - BSA(HAYCOCK): 1.8 M^2
BH CV ECHO MEAS - BSA: 1.8 M^2
BH CV ECHO MEAS - BZI_BMI: 22.8 KILOGRAMS/M^2
BH CV ECHO MEAS - BZI_METRIC_HEIGHT: 172.7 CM
BH CV ECHO MEAS - BZI_METRIC_WEIGHT: 68 KG
BH CV STRESS BP STAGE 1: NORMAL
BH CV STRESS BP STAGE 2: NORMAL
BH CV STRESS BP STAGE 3: NORMAL
BH CV STRESS DURATION MIN STAGE 1: 3
BH CV STRESS DURATION MIN STAGE 2: 3
BH CV STRESS DURATION MIN STAGE 3: 3
BH CV STRESS DURATION MIN STAGE 4: 1
BH CV STRESS DURATION SEC STAGE 1: 0
BH CV STRESS DURATION SEC STAGE 2: 0
BH CV STRESS DURATION SEC STAGE 3: 0
BH CV STRESS DURATION SEC STAGE 4: 27
BH CV STRESS GRADE STAGE 1: 10
BH CV STRESS GRADE STAGE 2: 12
BH CV STRESS GRADE STAGE 3: 14
BH CV STRESS GRADE STAGE 4: 16
BH CV STRESS HR STAGE 1: 105
BH CV STRESS HR STAGE 2: 114
BH CV STRESS HR STAGE 3: 127
BH CV STRESS HR STAGE 4: 142
BH CV STRESS METS STAGE 1: 5
BH CV STRESS METS STAGE 2: 7.5
BH CV STRESS METS STAGE 3: 10
BH CV STRESS METS STAGE 4: 13.5
BH CV STRESS PROTOCOL 1: NORMAL
BH CV STRESS RECOVERY BP: NORMAL MMHG
BH CV STRESS RECOVERY HR: 90 BPM
BH CV STRESS SPEED STAGE 1: 1.7
BH CV STRESS SPEED STAGE 2: 2.5
BH CV STRESS SPEED STAGE 3: 3.4
BH CV STRESS SPEED STAGE 4: 4.2
BH CV STRESS STAGE 1: 1
BH CV STRESS STAGE 2: 2
BH CV STRESS STAGE 3: 3
BH CV STRESS STAGE 4: 4
MAXIMAL PREDICTED HEART RATE: 168 BPM
PERCENT MAX PREDICTED HR: 84.52 %
STRESS BASELINE BP: NORMAL MMHG
STRESS BASELINE HR: 91 BPM
STRESS PERCENT HR: 99 %
STRESS POST EXERCISE DUR MIN: 10 MIN
STRESS POST EXERCISE DUR SEC: 27 SEC
STRESS POST PEAK BP: NORMAL MMHG
STRESS POST PEAK HR: 142 BPM
STRESS TARGET HR: 143 BPM

## 2017-10-11 PROCEDURE — 25010000002 PERFLUTREN 6.52 MG/ML SUSPENSION: Performed by: INTERNAL MEDICINE

## 2017-10-11 PROCEDURE — 93017 CV STRESS TEST TRACING ONLY: CPT

## 2017-10-11 PROCEDURE — 93352 ADMIN ECG CONTRAST AGENT: CPT | Performed by: INTERNAL MEDICINE

## 2017-10-11 PROCEDURE — 93350 STRESS TTE ONLY: CPT | Performed by: INTERNAL MEDICINE

## 2017-10-11 PROCEDURE — 93350 STRESS TTE ONLY: CPT

## 2017-10-11 PROCEDURE — 93018 CV STRESS TEST I&R ONLY: CPT | Performed by: INTERNAL MEDICINE

## 2017-10-11 RX ADMIN — PERFLUTREN 8.48 MG: 6.52 INJECTION, SUSPENSION INTRAVENOUS at 14:31

## 2017-10-11 NOTE — TELEPHONE ENCOUNTER
Pt came into the office today to ask about norco refill.  I informed him that Dr. Robertson wouldnot be able to refill pain medication because he did not go to his appt with Dr. Leonardo on 8/16/17.  He acknowledged understanding and said that he would speak to Dr. Robertson at his appt with him on 10/12/17 and ask what he suggested doing

## 2017-10-12 ENCOUNTER — OFFICE VISIT (OUTPATIENT)
Dept: FAMILY MEDICINE CLINIC | Facility: CLINIC | Age: 52
End: 2017-10-12

## 2017-10-12 VITALS
SYSTOLIC BLOOD PRESSURE: 128 MMHG | WEIGHT: 152 LBS | OXYGEN SATURATION: 98 % | HEART RATE: 98 BPM | BODY MASS INDEX: 23.04 KG/M2 | RESPIRATION RATE: 16 BRPM | DIASTOLIC BLOOD PRESSURE: 82 MMHG | HEIGHT: 68 IN

## 2017-10-12 DIAGNOSIS — K21.9 GASTROESOPHAGEAL REFLUX DISEASE, ESOPHAGITIS PRESENCE NOT SPECIFIED: Primary | ICD-10-CM

## 2017-10-12 PROCEDURE — 99213 OFFICE O/P EST LOW 20 MIN: CPT | Performed by: FAMILY MEDICINE

## 2017-10-12 RX ORDER — FAMOTIDINE 40 MG/1
40 TABLET, FILM COATED ORAL DAILY
Qty: 30 TABLET | Refills: 2 | Status: SHIPPED | OUTPATIENT
Start: 2017-10-12 | End: 2018-01-22

## 2017-11-03 ENCOUNTER — OFFICE VISIT (OUTPATIENT)
Dept: FAMILY MEDICINE CLINIC | Facility: CLINIC | Age: 52
End: 2017-11-03

## 2017-11-03 VITALS
WEIGHT: 154 LBS | SYSTOLIC BLOOD PRESSURE: 116 MMHG | HEIGHT: 68 IN | BODY MASS INDEX: 23.34 KG/M2 | RESPIRATION RATE: 16 BRPM | OXYGEN SATURATION: 96 % | HEART RATE: 87 BPM | DIASTOLIC BLOOD PRESSURE: 80 MMHG

## 2017-11-03 DIAGNOSIS — M25.522 BILATERAL ELBOW JOINT PAIN: Primary | ICD-10-CM

## 2017-11-03 DIAGNOSIS — M25.521 BILATERAL ELBOW JOINT PAIN: Primary | ICD-10-CM

## 2017-11-03 PROCEDURE — 99213 OFFICE O/P EST LOW 20 MIN: CPT | Performed by: FAMILY MEDICINE

## 2017-11-03 NOTE — PROGRESS NOTES
"Subjective   Francine Myers is a 52 y.o. male.     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      Current Outpatient Prescriptions:   •  diclofenac (VOLTAREN) 1 % gel gel, Apply 4 g topically 2 (Two) Times a Day., Disp: 100 g, Rfl: 1  •  diclofenac (VOLTAREN) 50 MG EC tablet, Take 1 tablet by mouth 3 (Three) Times a Day., Disp: 90 tablet, Rfl: 0  •  famotidine (PEPCID) 40 MG tablet, Take 1 tablet by mouth Daily., Disp: 30 tablet, Rfl: 2  No Known Allergies    Past Medical History:   Diagnosis Date   • GERD (gastroesophageal reflux disease)    • Injury of back    • Prostate CA      Past Surgical History:   Procedure Laterality Date   • BACK SURGERY      Patient has had 2 previous back surgeries.    • PROSTATE SURGERY         Review of Systems   Constitutional: Negative.    HENT: Negative.    Eyes: Negative.    Respiratory: Negative.    Cardiovascular: Negative.    Gastrointestinal: Negative.    Endocrine: Negative.    Genitourinary: Negative.    Musculoskeletal: Positive for joint swelling.   Skin: Negative.    Allergic/Immunologic: Negative.    Neurological: Negative.    Hematological: Negative.    Psychiatric/Behavioral: Negative.        Objective  /80  Pulse 87  Resp 16  Ht 68\" (172.7 cm)  Wt 154 lb (69.9 kg)  SpO2 96%  BMI 23.42 kg/m2  Physical Exam   Constitutional: He appears well-developed and well-nourished.   HENT:   Head: Normocephalic.   Eyes: Pupils are equal, round, and reactive to light.   Neck: Normal range of motion.   Cardiovascular: Normal rate, regular rhythm, normal heart sounds and intact distal pulses.    Pulmonary/Chest: Effort normal and breath sounds normal.   Abdominal: Soft. Bowel sounds are normal. "   Musculoskeletal: He exhibits tenderness.   Neurological: He is alert. He has normal reflexes.   Skin: Skin is warm and dry.   Psychiatric: He has a normal mood and affect. His behavior is normal. Judgment and thought content normal.   Nursing note and vitals reviewed.      Assessment/Plan   Magalie was seen today for arm pain.    Diagnoses and all orders for this visit:    Bilateral elbow joint pain    Other orders  -     diclofenac (VOLTAREN) 50 MG EC tablet; Take 1 tablet by mouth 3 (Three) Times a Day.  -     diclofenac (VOLTAREN) 1 % gel gel; Apply 4 g topically 2 (Two) Times a Day.                 No orders of the defined types were placed in this encounter.      Follow up:prn

## 2017-11-10 ENCOUNTER — HOSPITAL ENCOUNTER (EMERGENCY)
Age: 52
Discharge: HOME | End: 2017-11-10
Payer: MEDICAID

## 2017-11-10 VITALS
HEART RATE: 68 BPM | SYSTOLIC BLOOD PRESSURE: 129 MMHG | WEIGHT: 155 LBS | TEMPERATURE: 98.1 F | BODY MASS INDEX: 23.49 KG/M2 | DIASTOLIC BLOOD PRESSURE: 79 MMHG | OXYGEN SATURATION: 100 % | RESPIRATION RATE: 15 BRPM | HEIGHT: 68 IN

## 2017-11-10 DIAGNOSIS — G89.29 CHRONIC LEFT-SIDED LOW BACK PAIN WITHOUT SCIATICA: Primary | ICD-10-CM

## 2017-11-10 DIAGNOSIS — M54.50 CHRONIC LEFT-SIDED LOW BACK PAIN WITHOUT SCIATICA: Primary | ICD-10-CM

## 2017-11-10 DIAGNOSIS — R52 INADEQUATE PAIN CONTROL: ICD-10-CM

## 2017-11-10 PROCEDURE — 99283 EMERGENCY DEPT VISIT LOW MDM: CPT | Performed by: NURSE PRACTITIONER

## 2017-11-10 PROCEDURE — 99282 EMERGENCY DEPT VISIT SF MDM: CPT

## 2017-11-10 PROCEDURE — 96372 THER/PROPH/DIAG INJ SC/IM: CPT

## 2017-11-10 PROCEDURE — 6360000002 HC RX W HCPCS: Performed by: NURSE PRACTITIONER

## 2017-11-10 RX ORDER — HYDROCODONE BITARTRATE AND ACETAMINOPHEN 5; 325 MG/1; MG/1
1 TABLET ORAL EVERY 6 HOURS PRN
Qty: 10 TABLET | Refills: 0 | Status: SHIPPED | OUTPATIENT
Start: 2017-11-10 | End: 2017-11-17

## 2017-11-10 RX ORDER — METHYLPREDNISOLONE 4 MG/1
TABLET ORAL
Qty: 1 KIT | Refills: 0 | Status: SHIPPED | OUTPATIENT
Start: 2017-11-10 | End: 2017-11-24

## 2017-11-10 RX ORDER — CYCLOBENZAPRINE HCL 10 MG
10 TABLET ORAL 3 TIMES DAILY PRN
Qty: 30 TABLET | Refills: 0 | Status: SHIPPED | OUTPATIENT
Start: 2017-11-10 | End: 2017-11-20

## 2017-11-10 RX ORDER — NAPROXEN 500 MG/1
500 TABLET ORAL 2 TIMES DAILY
Qty: 20 TABLET | Refills: 0 | Status: SHIPPED | OUTPATIENT
Start: 2017-11-10 | End: 2017-11-24

## 2017-11-10 RX ORDER — KETOROLAC TROMETHAMINE 30 MG/ML
60 INJECTION, SOLUTION INTRAMUSCULAR; INTRAVENOUS ONCE
Status: COMPLETED | OUTPATIENT
Start: 2017-11-10 | End: 2017-11-10

## 2017-11-10 RX ADMIN — KETOROLAC TROMETHAMINE 60 MG: 30 INJECTION, SOLUTION INTRAMUSCULAR at 10:43

## 2017-11-10 ASSESSMENT — ENCOUNTER SYMPTOMS: BACK PAIN: 1

## 2017-11-10 ASSESSMENT — PAIN SCALES - GENERAL
PAINLEVEL_OUTOF10: 9
PAINLEVEL_OUTOF10: 8

## 2017-11-10 NOTE — ED NOTES
ASSESSMENT:    PT ALERT/ORIENTED X4. SKIN:  WARM/DRY PINK CAPILLARY REFILL < 2SECS     LUNGS: CLEAR UPPER AND LOWER LOBES, RESPIRATIONS EVEN/UNLABORED      EXTREMITIES:  BILATERAL DP AND PT AND NO EDEMA NOTED. NO DISTRESS NOTED. SIDE RAILS UP AND CALL LIGHT IN REACH.      Mike Hobbs RN  11/10/17 1000

## 2017-11-10 NOTE — ED PROVIDER NOTES
[facility]  eMERGENCY dEPARTMENT eNCOUnter      Pt Name: Julia Wright  MRN: [id number]  [place] 1965  Date of evaluation: 11/10/2017  Provider: YOLANDA Ward    CHIEF COMPLAINT       Chief Complaint   Patient presents with    Back Pain     co of low back pain (has chronic issues)         HISTORY OF PRESENT ILLNESS  (Location/Symptom, Timing/Onset, Context/Setting, Quality, Duration, Modifying Factors, Severity.)   Julia Wright is a 46 y.o. male who presents to the emergency department with C/O of chronic low back pain. Patient denies any injury or trauma. The patient has been seen in our facility multiple times for chronic low back pain. He tells me he was recently incarcerated and 55 days that he was incarcerated he was not able to take any of his prescribed pain medicine and he was sleeping on a hard bed with a very small mattress that he feels was not conducive to his back pain management. He tells me he was prescribed anti-inflammatories as they would not prescribe him any pain medicine. The patient tells me he had an appointment scheduled with Dr. Dawna Cornejo however due to this incarceration he was unable to keep this appointment. The patient presents to the ED today for a short course of pain management as he states he has recently started back to work and he needs to control his pain so that he can keep his job. Again he denies any recent injury or trauma. No concerns for any issues with bowel or bladder incontinence. No fevers. No concerns for any large cavity weakness today or loss of function. The history is provided by the patient. Nursing Notes were reviewed and I agree. REVIEW OF SYSTEMS    (2-9 systems for level 4, 10 or more for level 5)     Review of Systems   Constitutional: Negative for chills and fever. Genitourinary: Negative for difficulty urinating. Musculoskeletal: Positive for back pain.         Except as noted above the remainder of the review of systems Inadequate pain control          DISPOSITION/PLAN   DISPOSITION Decision to Discharge    PATIENT REFERRED TO:  Michelle Mayorga MD  [facility]  [phone]      If symptoms worsen      DISCHARGE MEDICATIONS:  Discharge Medication List as of 11/10/2017 10:27 AM      START taking these medications    Details   HYDROcodone-acetaminophen (NORCO) 5-325 MG per tablet Take 1 tablet by mouth every 6 hours as needed for Pain ., Disp-10 tablet, R-0Print      cyclobenzaprine (FLEXERIL) 10 MG tablet Take 1 tablet by mouth 3 times daily as needed for Muscle spasms, Disp-30 tablet, R-0Print      methylPREDNISolone (MEDROL, ADELAIDE,) 4 MG tablet Take by mouth., Disp-1 kit, R-0Print             (Please note that portions of this note were completed with a voice recognition program.  Efforts were made to edit the dictations but occasionally words are mis-transcribed.)    Veronique Babin, YOLANDA Antoine  11/12/17 1077

## 2017-11-16 ENCOUNTER — APPOINTMENT (OUTPATIENT)
Dept: GENERAL RADIOLOGY | Facility: HOSPITAL | Age: 52
End: 2017-11-16

## 2017-11-16 ENCOUNTER — HOSPITAL ENCOUNTER (EMERGENCY)
Facility: HOSPITAL | Age: 52
Discharge: HOME | End: 2017-11-16
Admitting: EMERGENCY MEDICINE

## 2017-11-16 VITALS
TEMPERATURE: 98.2 F | SYSTOLIC BLOOD PRESSURE: 128 MMHG | RESPIRATION RATE: 18 BRPM | WEIGHT: 155 LBS | DIASTOLIC BLOOD PRESSURE: 75 MMHG | OXYGEN SATURATION: 98 % | HEART RATE: 72 BPM | HEIGHT: 68 IN | BODY MASS INDEX: 23.49 KG/M2

## 2017-11-16 DIAGNOSIS — J40 BRONCHITIS: ICD-10-CM

## 2017-11-16 DIAGNOSIS — R07.9 CHEST PAIN, UNSPECIFIED TYPE: Primary | ICD-10-CM

## 2017-11-16 LAB
ALBUMIN SERPL-MCNC: 4.5 G/DL (ref 3.5–5)
ALBUMIN/GLOB SERPL: 1.3 G/DL (ref 1.1–2.5)
ALP SERPL-CCNC: 108 U/L (ref 24–120)
ALT SERPL W P-5'-P-CCNC: 33 U/L (ref 0–54)
AMPHET+METHAMPHET UR QL: NEGATIVE
AMYLASE SERPL-CCNC: 70 U/L (ref 30–110)
ANION GAP SERPL CALCULATED.3IONS-SCNC: 11 MMOL/L (ref 4–13)
APTT PPP: 32.3 SECONDS (ref 24.1–34.8)
AST SERPL-CCNC: 27 U/L (ref 7–45)
BARBITURATES UR QL SCN: NEGATIVE
BASOPHILS # BLD AUTO: 0.03 10*3/MM3 (ref 0–0.2)
BASOPHILS NFR BLD AUTO: 0.3 % (ref 0–2)
BENZODIAZ UR QL SCN: NEGATIVE
BILIRUB SERPL-MCNC: 0.5 MG/DL (ref 0.1–1)
BILIRUB UR QL STRIP: NEGATIVE
BUN BLD-MCNC: 6 MG/DL (ref 5–21)
BUN/CREAT SERPL: 7.7 (ref 7–25)
CALCIUM SPEC-SCNC: 9.1 MG/DL (ref 8.4–10.4)
CANNABINOIDS SERPL QL: NEGATIVE
CHLORIDE SERPL-SCNC: 100 MMOL/L (ref 98–110)
CLARITY UR: CLEAR
CO2 SERPL-SCNC: 27 MMOL/L (ref 24–31)
COCAINE UR QL: NEGATIVE
COLOR UR: YELLOW
CREAT BLD-MCNC: 0.78 MG/DL (ref 0.5–1.4)
CRP SERPL-MCNC: <0.5 MG/DL (ref 0–0.99)
D DIMER PPP FEU-MCNC: 0.29 MG/L (FEU) (ref 0–0.5)
DEPRECATED RDW RBC AUTO: 43.4 FL (ref 40–54)
EOSINOPHIL # BLD AUTO: 0.23 10*3/MM3 (ref 0–0.7)
EOSINOPHIL NFR BLD AUTO: 2.3 % (ref 0–4)
ERYTHROCYTE [DISTWIDTH] IN BLOOD BY AUTOMATED COUNT: 13.3 % (ref 12–15)
GFR SERPL CREATININE-BSD FRML MDRD: 105 ML/MIN/1.73
GLOBULIN UR ELPH-MCNC: 3.6 GM/DL
GLUCOSE BLD-MCNC: 86 MG/DL (ref 70–100)
GLUCOSE UR STRIP-MCNC: NEGATIVE MG/DL
HCT VFR BLD AUTO: 47.8 % (ref 40–52)
HGB BLD-MCNC: 16.7 G/DL (ref 14–18)
HGB UR QL STRIP.AUTO: NEGATIVE
HOLD SPECIMEN: NORMAL
HOLD SPECIMEN: NORMAL
IMM GRANULOCYTES # BLD: 0.02 10*3/MM3 (ref 0–0.03)
IMM GRANULOCYTES NFR BLD: 0.2 % (ref 0–5)
INR PPP: 0.88 (ref 0.91–1.09)
KETONES UR QL STRIP: NEGATIVE
LEUKOCYTE ESTERASE UR QL STRIP.AUTO: NEGATIVE
LIPASE SERPL-CCNC: 48 U/L (ref 23–203)
LYMPHOCYTES # BLD AUTO: 3.09 10*3/MM3 (ref 0.72–4.86)
LYMPHOCYTES NFR BLD AUTO: 31.1 % (ref 15–45)
MCH RBC QN AUTO: 31.2 PG (ref 28–32)
MCHC RBC AUTO-ENTMCNC: 34.9 G/DL (ref 33–36)
MCV RBC AUTO: 89.2 FL (ref 82–95)
METHADONE UR QL SCN: NEGATIVE
MONOCYTES # BLD AUTO: 0.54 10*3/MM3 (ref 0.19–1.3)
MONOCYTES NFR BLD AUTO: 5.4 % (ref 4–12)
NEUTROPHILS # BLD AUTO: 6.03 10*3/MM3 (ref 1.87–8.4)
NEUTROPHILS NFR BLD AUTO: 60.7 % (ref 39–78)
NITRITE UR QL STRIP: NEGATIVE
OPIATES UR QL: NEGATIVE
PCP UR QL SCN: NEGATIVE
PH UR STRIP.AUTO: 7 [PH] (ref 5–8)
PLATELET # BLD AUTO: 196 10*3/MM3 (ref 130–400)
PMV BLD AUTO: 11.8 FL (ref 6–12)
POTASSIUM BLD-SCNC: 3.6 MMOL/L (ref 3.5–5.3)
PROT SERPL-MCNC: 8.1 G/DL (ref 6.3–8.7)
PROT UR QL STRIP: NEGATIVE
PROTHROMBIN TIME: 12.2 SECONDS (ref 11.9–14.6)
RBC # BLD AUTO: 5.36 10*6/MM3 (ref 4.8–5.9)
SODIUM BLD-SCNC: 138 MMOL/L (ref 135–145)
SP GR UR STRIP: 1.01 (ref 1–1.03)
TROPONIN I SERPL-MCNC: <0.012 NG/ML (ref 0–0.03)
UROBILINOGEN UR QL STRIP: NORMAL
WBC NRBC COR # BLD: 9.94 10*3/MM3 (ref 4.8–10.8)
WHOLE BLOOD HOLD SPECIMEN: NORMAL
WHOLE BLOOD HOLD SPECIMEN: NORMAL

## 2017-11-16 PROCEDURE — 80307 DRUG TEST PRSMV CHEM ANLYZR: CPT | Performed by: NURSE PRACTITIONER

## 2017-11-16 PROCEDURE — 93005 ELECTROCARDIOGRAM TRACING: CPT

## 2017-11-16 PROCEDURE — 84484 ASSAY OF TROPONIN QUANT: CPT | Performed by: NURSE PRACTITIONER

## 2017-11-16 PROCEDURE — 81003 URINALYSIS AUTO W/O SCOPE: CPT | Performed by: NURSE PRACTITIONER

## 2017-11-16 PROCEDURE — 85379 FIBRIN DEGRADATION QUANT: CPT | Performed by: NURSE PRACTITIONER

## 2017-11-16 PROCEDURE — 71020 HC CHEST PA AND LATERAL: CPT

## 2017-11-16 PROCEDURE — 80053 COMPREHEN METABOLIC PANEL: CPT | Performed by: NURSE PRACTITIONER

## 2017-11-16 PROCEDURE — 85025 COMPLETE CBC W/AUTO DIFF WBC: CPT | Performed by: NURSE PRACTITIONER

## 2017-11-16 PROCEDURE — 93010 ELECTROCARDIOGRAM REPORT: CPT | Performed by: INTERNAL MEDICINE

## 2017-11-16 PROCEDURE — 85610 PROTHROMBIN TIME: CPT | Performed by: NURSE PRACTITIONER

## 2017-11-16 PROCEDURE — 86140 C-REACTIVE PROTEIN: CPT | Performed by: NURSE PRACTITIONER

## 2017-11-16 PROCEDURE — 85730 THROMBOPLASTIN TIME PARTIAL: CPT | Performed by: NURSE PRACTITIONER

## 2017-11-16 PROCEDURE — 99285 EMERGENCY DEPT VISIT HI MDM: CPT

## 2017-11-16 PROCEDURE — 83690 ASSAY OF LIPASE: CPT | Performed by: NURSE PRACTITIONER

## 2017-11-16 PROCEDURE — 82150 ASSAY OF AMYLASE: CPT | Performed by: NURSE PRACTITIONER

## 2017-11-16 RX ORDER — AZITHROMYCIN 250 MG/1
TABLET, FILM COATED ORAL
Qty: 6 TABLET | Refills: 0 | Status: SHIPPED | OUTPATIENT
Start: 2017-11-16 | End: 2017-12-13

## 2017-11-16 RX ORDER — ALUMINA, MAGNESIA, AND SIMETHICONE 2400; 2400; 240 MG/30ML; MG/30ML; MG/30ML
15 SUSPENSION ORAL ONCE
Status: COMPLETED | OUTPATIENT
Start: 2017-11-16 | End: 2017-11-16

## 2017-11-16 RX ADMIN — ALUMINUM HYDROXIDE, MAGNESIUM HYDROXIDE, AND DIMETHICONE 15 ML: 400; 400; 40 SUSPENSION ORAL at 08:02

## 2017-11-16 RX ADMIN — LIDOCAINE HYDROCHLORIDE 15 ML: 20 SOLUTION ORAL; TOPICAL at 08:02

## 2017-11-16 NOTE — ED PROVIDER NOTES
Subjective   HPI Comments: Patient is a 52-year-old  male that presents to the ER today with complaint of chest pain.  The patient reports that he has had midsternal chest pain for 2 months now.  He states that this reassuring is constant in nature.  He states it is nonradiating.  Patient was seen at this facility on 10/10/2017.  At that time he had a normal stress test.  Patient reports this pain is continued.  The patient states that he does not get short of breath with this.  He denies any nausea vomiting or diaphoresis with this.  The patient reports he does smoke one pack a day for the past 30 years.  Patient reports he does have a family history of cardiac disease, he denies any personal history of cardiac disease.  He presents here today for further evaluation.      Patient is a 52 y.o. male presenting with chest pain.   History provided by:  Patient   used: No    Chest Pain   Pain location:  Substernal area  Pain quality: pressure    Pain radiates to:  Does not radiate  Pain severity:  Mild  Onset quality:  Sudden  Duration:  2 months  Timing:  Constant  Progression:  Unchanged  Chronicity:  New  Context: not breathing, not drug use, not eating, not intercourse, not lifting, not movement, not raising an arm, not at rest, not stress and not trauma    Relieved by:  Nothing  Worsened by:  Nothing  Ineffective treatments:  None tried  Associated symptoms: heartburn    Associated symptoms: no abdominal pain, no AICD problem, no altered mental status, no anorexia, no anxiety, no back pain, no claudication, no cough, no diaphoresis, no dizziness, no dysphagia, no fatigue, no fever, no headache, no lower extremity edema, no nausea, no near-syncope, no numbness, no orthopnea, no palpitations, no PND, no shortness of breath, no syncope, no vomiting and no weakness    Risk factors: male sex and smoking    Risk factors: no aortic disease, no birth control, no coronary artery disease, no  diabetes mellitus, no Jamey-Danlos syndrome, no hypertension, no immobilization, no Marfan's syndrome, not obese, not pregnant, no prior DVT/PE and no surgery        Review of Systems   Constitutional: Negative for diaphoresis, fatigue and fever.   HENT: Negative for trouble swallowing.    Respiratory: Negative for cough and shortness of breath.    Cardiovascular: Positive for chest pain. Negative for palpitations, orthopnea, claudication, syncope, PND and near-syncope.   Gastrointestinal: Positive for heartburn. Negative for abdominal pain, anorexia, nausea and vomiting.   Musculoskeletal: Negative for back pain.   Neurological: Negative for dizziness, weakness, numbness and headaches.   All other systems reviewed and are negative.      Past Medical History:   Diagnosis Date   • GERD (gastroesophageal reflux disease)    • Injury of back    • Prostate CA        No Known Allergies    Past Surgical History:   Procedure Laterality Date   • BACK SURGERY      Patient has had 2 previous back surgeries.    • PROSTATE SURGERY         Family History   Problem Relation Age of Onset   • Arthritis Mother    • Heart disease Father    • Cancer Father    • No Known Problems Sister    • No Known Problems Brother    • No Known Problems Brother        Social History     Social History   • Marital status:      Spouse name: N/A   • Number of children: N/A   • Years of education: N/A     Social History Main Topics   • Smoking status: Current Every Day Smoker     Packs/day: 1.00     Types: Cigarettes   • Smokeless tobacco: None   • Alcohol use No   • Drug use: No   • Sexual activity: Not Asked     Other Topics Concern   • None     Social History Narrative           Objective   Physical Exam   Constitutional: He is oriented to person, place, and time. He appears well-developed and well-nourished.   HENT:   Head: Normocephalic and atraumatic.   Eyes: Conjunctivae are normal. Pupils are equal, round, and reactive to light.   Neck:  Normal range of motion.   Cardiovascular: Normal rate, regular rhythm and normal heart sounds.    Pulmonary/Chest: Effort normal.   Abdominal: Soft. Bowel sounds are normal.   Neurological: He is alert and oriented to person, place, and time.   Skin: Skin is warm and dry.   Psychiatric: He has a normal mood and affect.   Nursing note and vitals reviewed.      Procedures         ED Course  ED Course   Comment By Time   EKG from today compared to EKG from 10/10/17; no acute changes noted. Kae Michelle, APRN 11/16 0753   The patient reports relief of his symptoms after a GI cocktail. Kae Michelle, APRN 11/16 0828   Patient's labs are reviewed.  They are unremarkable.  The patient did report relief of his symptoms with a GI cocktail. Kae Michelle, APRN 11/16 0903   Patient's chest x-ray shows bronchial wall thickening.  Patient does have a history of smoking.  We will treat him for bronchitis.  Advised the patient also reports smoking.  I discussed with him that he will need to follow-up with his primary care provider, Dr. Robertson, further discuss this.  Patient reports that he has not been taking his Pepcid.  I believe that a lot of his symptoms are likely caused by reflux.  I advised her to please restart taking his Pepcid.  At this time patient will be discharged home in stable condition he is asked return to the ED or worsening symptoms. Kae Michelle, ALEJANDRA 11/16 0905   Pt case discussed with Dr. Wu who agrees with plan of care. Kae Michelle, ALEJANDRA 11/16 0911                HEART Score (for prediction of 6-week risk of major adverse cardiac event) reviewed and/or performed as part of the patient evaluation and treatment planning process.  The result associated with this review/performance is: 3       MDM  Number of Diagnoses or Management Options  Bronchitis: established and improving  Chest pain, unspecified type: established and improving     Amount and/or Complexity of Data  Reviewed  Clinical lab tests: ordered and reviewed  Tests in the radiology section of CPT®: ordered and reviewed  Tests in the medicine section of CPT®: ordered and reviewed  Discuss the patient with other providers: yes    Patient Progress  Patient progress: stable      Final diagnoses:   Chest pain, unspecified type   Bronchitis            Kae Michelle, APRN  11/16/17 0911

## 2017-11-24 ENCOUNTER — HOSPITAL ENCOUNTER (EMERGENCY)
Age: 52
Discharge: HOME | End: 2017-11-24
Payer: MEDICAID

## 2017-11-24 VITALS
HEIGHT: 68 IN | OXYGEN SATURATION: 99 % | BODY MASS INDEX: 23.49 KG/M2 | WEIGHT: 155 LBS | RESPIRATION RATE: 16 BRPM | TEMPERATURE: 98.2 F | HEART RATE: 70 BPM

## 2017-11-24 DIAGNOSIS — G89.29 ACUTE EXACERBATION OF CHRONIC LOW BACK PAIN: Primary | ICD-10-CM

## 2017-11-24 DIAGNOSIS — M54.50 ACUTE EXACERBATION OF CHRONIC LOW BACK PAIN: Primary | ICD-10-CM

## 2017-11-24 PROCEDURE — 99282 EMERGENCY DEPT VISIT SF MDM: CPT

## 2017-11-24 PROCEDURE — 6360000002 HC RX W HCPCS: Performed by: PHYSICIAN ASSISTANT

## 2017-11-24 PROCEDURE — 96372 THER/PROPH/DIAG INJ SC/IM: CPT

## 2017-11-24 PROCEDURE — 99282 EMERGENCY DEPT VISIT SF MDM: CPT | Performed by: PHYSICIAN ASSISTANT

## 2017-11-24 RX ORDER — KETOROLAC TROMETHAMINE 30 MG/ML
30 INJECTION, SOLUTION INTRAMUSCULAR; INTRAVENOUS ONCE
Status: COMPLETED | OUTPATIENT
Start: 2017-11-24 | End: 2017-11-24

## 2017-11-24 RX ORDER — NAPROXEN 500 MG/1
500 TABLET ORAL 2 TIMES DAILY
Qty: 20 TABLET | Refills: 0 | Status: SHIPPED | OUTPATIENT
Start: 2017-11-24 | End: 2017-12-11

## 2017-11-24 RX ORDER — CYCLOBENZAPRINE HCL 10 MG
10 TABLET ORAL 3 TIMES DAILY PRN
Qty: 20 TABLET | Refills: 0 | Status: SHIPPED | OUTPATIENT
Start: 2017-11-24 | End: 2017-12-04

## 2017-11-24 RX ADMIN — KETOROLAC TROMETHAMINE 30 MG: 30 INJECTION, SOLUTION INTRAMUSCULAR at 10:04

## 2017-11-24 ASSESSMENT — PAIN DESCRIPTION - PAIN TYPE: TYPE: CHRONIC PAIN

## 2017-11-24 ASSESSMENT — PAIN SCALES - GENERAL: PAINLEVEL_OUTOF10: 8

## 2017-11-24 ASSESSMENT — PAIN DESCRIPTION - LOCATION: LOCATION: BACK

## 2017-11-26 NOTE — ED PROVIDER NOTES
lb (70.3 kg)       Nursing notes reviewed. Medication list, allergies, PMH, PSH reviewed. Constitutional: This patient is lying in bed in no acute distress. The patient is talking comfortably in complete sentences. Eyes: Pupils are equal, round, and reactive to light. Extraocular movements are intact. ENT/head: Normocephalic and atraumatic. Oropharynx is clear. Neck: Supple. Full ROM in neck without pain. No cspine tenderness. Respiratory:  Lungs are clear to auscultation bilaterally with normal effort. There are no wheezes, rhonchi or rales. Cardiovascular: The patient has a regular rate and rhythm. Gastrointestinal:  Soft, nontender, nondistended. There is no obvious hepatosplenomegaly. There is no rebound or guarding. The patient has normoactive bowel sounds. No masses. Musculoskeletal:  There is no cyanosis or edema. The patient has a full range of motion in all extremities without pain. Negative straight leg raise test bilaterally. No midline spinal tenderness of cervical, thoracic, lumbar, or sacral spine. No warmth or erythema palpated along spine. No evidence of cellulitis on back examination. No subcutaneous air palpated in back or along spine. Skin:  Warm and dry without rashes. Neurologic:  Alert and oriented x3. No focal neurological deficits. Patient able to ambulate without deficit. Sensation intact in all extremities. Strength 5/5 in knee extension/flexion, ankle dorsi/plantar flexion, and great toe flexion/extension bilaterally. Plantar reflex downgoing bilaterally. Assessment and Medical Decision Making:     The patient has no alarms signs, symptoms or physical examination findings that would warrant emergent MRI imaging at this time. At this time there are no concerning symptoms including bowel or bladder incontinence, saddle anesthesia, fever, IV drug use, cancer, weakness, numbness or recent trauma.  There are no abnormal neurologic findings. Given pain medication. Patient's back pain is most likely musculoskeletal in nature, and there is minimal concern for spinal fracture, infection, malignancy, epidural abscess, cauda equina syndrome, or other serious spinal cord pathology, as the patient does not have significant risk factors as listed in the HPI. I will advise the patient to follow up with their primary care team for continued pain management as needed. Laboratory Studies: Labs Reviewed - No data to display     Vitals:    11/24/17 0906   Pulse: 70   Resp: 16   Temp: 98.2 °F (36.8 °C)   TempSrc: Oral   SpO2: 99%   Weight: 155 lb (70.3 kg)   Height: 5' 8\" (1.727 m)           Final Diagnostic Impression:    1. Acute exacerbation of chronic low back pain          ED Disposition/Plan:  DISPOSITION Decision to Discharge    Pt was told that if symptoms worsen or new symptoms develop they are to return to the emergency department. Patient to follow up. Patient  was educated on diagnosis and treatment plan. All of patient's questions were answered, and  the patient understands the discharge plan. I do not feel the patient has a life-threatening condition at this time. Patient is to be discharged. Ez Mooney       PATIENT REFERRED TO:  Cristiane Schaeffer MD  [facility]  [phone]    Schedule an appointment as soon as possible for a visit   To discuss chronic pain management      DISCHARGE MEDICATIONS:  Discharge Medication List as of 11/24/2017  9:33 AM      START taking these medications    Details   cyclobenzaprine (FLEXERIL) 10 MG tablet Take 1 tablet by mouth 3 times daily as needed for Muscle spasms, Disp-20 tablet, R-0Print                 KIMBER Lemus [place]  11/26/17 1249

## 2017-12-11 ENCOUNTER — HOSPITAL ENCOUNTER (EMERGENCY)
Age: 52
Discharge: HOME | End: 2017-12-11
Payer: MEDICAID

## 2017-12-11 VITALS
HEART RATE: 74 BPM | SYSTOLIC BLOOD PRESSURE: 128 MMHG | BODY MASS INDEX: 22.73 KG/M2 | DIASTOLIC BLOOD PRESSURE: 86 MMHG | HEIGHT: 68 IN | TEMPERATURE: 97.7 F | RESPIRATION RATE: 18 BRPM | WEIGHT: 150 LBS | OXYGEN SATURATION: 98 %

## 2017-12-11 DIAGNOSIS — M54.50 ACUTE EXACERBATION OF CHRONIC LOW BACK PAIN: Primary | ICD-10-CM

## 2017-12-11 DIAGNOSIS — G89.29 ACUTE EXACERBATION OF CHRONIC LOW BACK PAIN: Primary | ICD-10-CM

## 2017-12-11 PROCEDURE — 99283 EMERGENCY DEPT VISIT LOW MDM: CPT | Performed by: NURSE PRACTITIONER

## 2017-12-11 PROCEDURE — 6360000002 HC RX W HCPCS: Performed by: NURSE PRACTITIONER

## 2017-12-11 PROCEDURE — 99282 EMERGENCY DEPT VISIT SF MDM: CPT

## 2017-12-11 PROCEDURE — 96372 THER/PROPH/DIAG INJ SC/IM: CPT

## 2017-12-11 RX ORDER — NAPROXEN 500 MG/1
500 TABLET ORAL 2 TIMES DAILY
Qty: 20 TABLET | Refills: 0 | Status: SHIPPED | OUTPATIENT
Start: 2017-12-11 | End: 2017-12-27

## 2017-12-11 RX ORDER — KETOROLAC TROMETHAMINE 30 MG/ML
60 INJECTION, SOLUTION INTRAMUSCULAR; INTRAVENOUS ONCE
Status: COMPLETED | OUTPATIENT
Start: 2017-12-11 | End: 2017-12-11

## 2017-12-11 RX ORDER — CYCLOBENZAPRINE HCL 10 MG
10 TABLET ORAL 3 TIMES DAILY PRN
Qty: 30 TABLET | Refills: 0 | Status: SHIPPED | OUTPATIENT
Start: 2017-12-11 | End: 2017-12-21

## 2017-12-11 RX ORDER — OXYCODONE HYDROCHLORIDE AND ACETAMINOPHEN 5; 325 MG/1; MG/1
1 TABLET ORAL EVERY 4 HOURS PRN
Qty: 10 TABLET | Refills: 0 | Status: SHIPPED | OUTPATIENT
Start: 2017-12-11 | End: 2017-12-18

## 2017-12-11 RX ADMIN — KETOROLAC TROMETHAMINE 60 MG: 30 INJECTION, SOLUTION INTRAMUSCULAR at 09:04

## 2017-12-11 ASSESSMENT — ENCOUNTER SYMPTOMS: BACK PAIN: 1

## 2017-12-11 ASSESSMENT — PAIN SCALES - GENERAL: PAINLEVEL_OUTOF10: 8

## 2017-12-11 NOTE — ED PROVIDER NOTES
[facility]  eMERGENCY dEPARTMENT eNCOUnter      Pt Name: Tae Dumont  MRN: [id number]  [place] 1965  Date of evaluation: 12/11/2017  Provider: YOLANDA Castillo    CHIEF COMPLAINT       Chief Complaint   Patient presents with    Back Pain         HISTORY OF PRESENT ILLNESS  (Location/Symptom, Timing/Onset, Context/Setting, Quality, Duration, Modifying Factors, Severity.)   Tae Dumont is a 46 y.o. male who presents to the emergency department With chief complaint of chronic low back pain. Patient presents to the ED today for pain control as he has a significant past medical history of chronic back pain. He has been in pain management in the past and has discontinued pain management and is now following with his primary care Dr. Michelle Alegria and is currently awaiting an appointment with Dr. mArit Cartwright for an evaluation. Patient denies any new neurological deficit or symptoms today. Patient denies any loss of bowel or bladder control. No concerns with any recent saddle anesthesia. Patient states he has changed jobs and his current job is more strenuous and he was not able to complete his shift today therefore he presents to the ED for pain management. The history is provided by the patient. Nursing Notes were reviewed and I agree. REVIEW OF SYSTEMS    (2-9 systems for level 4, 10 or more for level 5)     Review of Systems   Constitutional: Negative for activity change, appetite change, chills and fever. Genitourinary: Negative for difficulty urinating, frequency and hematuria. Musculoskeletal: Positive for back pain. Negative for gait problem. Except as noted above the remainder of the review of systems was reviewed and negative.        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 FAMILY HISTORY     History reviewed. No pertinent family history. SOCIAL HISTORY       Social History     Social History    Marital status:      Spouse name: N/A    Number of children: N/A    Years of education: N/A     Social History Main Topics    Smoking status: Current Every Day Smoker     Packs/day: 1.00     Types: Cigarettes    Smokeless tobacco: Never Used    Alcohol use No    Drug use: No    Sexual activity: Not Asked     Other Topics Concern    None     Social History Narrative    None       SCREENINGS           PHYSICAL EXAM    (up to 7 for level 4, 8 or more for level 5)     ED Triage Vitals [12/11/17 0833]   BP Temp Temp src Pulse Resp SpO2 Height Weight   (!) 134/99 97.7 °F (36.5 °C) -- 71 16 97 % 5' 8\" (1.727 m) 150 lb (68 kg)       Physical Exam   Constitutional: He is oriented to person, place, and time. He appears well-developed and well-nourished. No distress. Musculoskeletal: Normal range of motion. He exhibits no edema, tenderness or deformity. Arms:  Neurological: He is alert and oriented to person, place, and time. No cranial nerve deficit. Coordination normal.   Skin: Skin is warm and dry. He is not diaphoretic. No erythema. Psychiatric: He has a normal mood and affect. Nursing note and vitals reviewed.         DIAGNOSTIC RESULTS     RADIOLOGY:   Non-plain film images such as CT, Ultrasound and MRI are read by the radiologist. Plain radiographic images are visualized and preliminarily interpreted by No att. providers found with the below findings:        Interpretation per the Radiologist below, if available at the time of this note:    No orders to display as of 12/11/2017  9:15 AM      START taking these medications    Details   oxyCODONE-acetaminophen (PERCOCET) 5-325 MG per tablet Take 1 tablet by mouth every 4 hours as needed for Pain ., Disp-10 tablet, R-0Print      cyclobenzaprine (FLEXERIL) 10 MG tablet Take 1 tablet by mouth 3 times daily as needed for Muscle spasms, Disp-30 tablet, R-0Print             (Please note that portions of this note were completed with a voice recognition program.  Efforts were made to edit the dictations but occasionally words are mis-transcribed.)    Florentino Ramon, YOLANDA Ramon, YOLANDA  12/11/17 1364

## 2017-12-13 ENCOUNTER — OFFICE VISIT (OUTPATIENT)
Dept: FAMILY MEDICINE CLINIC | Facility: CLINIC | Age: 52
End: 2017-12-13

## 2017-12-13 VITALS
BODY MASS INDEX: 24.55 KG/M2 | SYSTOLIC BLOOD PRESSURE: 122 MMHG | RESPIRATION RATE: 16 BRPM | WEIGHT: 162 LBS | TEMPERATURE: 98.6 F | DIASTOLIC BLOOD PRESSURE: 70 MMHG | HEART RATE: 76 BPM | HEIGHT: 68 IN

## 2017-12-13 DIAGNOSIS — R93.89 ABNORMAL CXR: ICD-10-CM

## 2017-12-13 DIAGNOSIS — L98.9 SKIN LESION: Primary | ICD-10-CM

## 2017-12-13 PROCEDURE — 99213 OFFICE O/P EST LOW 20 MIN: CPT | Performed by: FAMILY MEDICINE

## 2017-12-13 RX ORDER — OXYCODONE HYDROCHLORIDE AND ACETAMINOPHEN 5; 325 MG/1; MG/1
TABLET ORAL
COMMUNITY
Start: 2017-12-11 | End: 2017-12-18

## 2017-12-13 NOTE — PROGRESS NOTES
"Ashley Myers is a 52 y.o. male.     Chief Complaint   Patient presents with   • Follow-up      History of Present Illness     he continues to smoke--recent cxr ? copd--denies any hemoptysis--agreeable for pfts..he notes a lesion on the right side of the face has been getting larger and darker...he is needing a note for occunet stating he is not needing  opiates for chronic use--he does note  a recent injury      Current Outpatient Prescriptions:   •  oxyCODONE-acetaminophen (PERCOCET) 5-325 MG per tablet, Take  by mouth., Disp: , Rfl:   •  famotidine (PEPCID) 40 MG tablet, Take 1 tablet by mouth Daily., Disp: 30 tablet, Rfl: 2  No Known Allergies    Past Medical History:   Diagnosis Date   • GERD (gastroesophageal reflux disease)    • Injury of back    • Prostate CA      Past Surgical History:   Procedure Laterality Date   • BACK SURGERY      Patient has had 2 previous back surgeries.    • PROSTATE SURGERY         Review of Systems   Constitutional: Negative.    HENT: Negative.    Eyes: Negative.    Respiratory: Negative.    Cardiovascular: Negative.    Gastrointestinal: Negative.    Endocrine: Negative.    Genitourinary: Negative.    Musculoskeletal: Positive for back pain.   Skin: Positive for color change.   Allergic/Immunologic: Negative.    Neurological: Negative.    Hematological: Negative.    Psychiatric/Behavioral: Negative.        Objective   height is 172.7 cm (67.99\") and weight is 73.5 kg (162 lb). His temperature is 98.6 °F (37 °C). His blood pressure is 122/70 and his pulse is 76. His respiration is 16.     Physical Exam   Constitutional: He is oriented to person, place, and time. He appears well-developed and well-nourished.   HENT:   Head: Normocephalic.   Eyes: Pupils are equal, round, and reactive to light.   Neck: Normal range of motion.   Cardiovascular: Normal rate, regular rhythm and normal heart sounds.    Pulmonary/Chest: Effort normal and breath sounds normal.   Abdominal: " Soft. Bowel sounds are normal.   Musculoskeletal: Normal range of motion.   Neurological: He is alert and oriented to person, place, and time. He has normal reflexes.   Skin: Skin is warm and dry.   Psychiatric: He has a normal mood and affect. His behavior is normal. Judgment and thought content normal.   Nursing note and vitals reviewed.  exam does note 1cm lesion dark brown with scale right side of face    Assessment/Plan   Magalie was seen today for follow-up.    Diagnoses and all orders for this visit:    Skin lesion  -     Ambulatory Referral to Dermatology    Abnormal CXR  -     Full Pulmonary Function Test & ABG Without Bronchodilator    He is needing a note stating he is not using chronic opiates--I am not aware of the patient using chronic opiates for pain--however I have not performed a [facility] or [facility] search so my knowledge is limited             Orders Placed This Encounter   Procedures   • Ambulatory Referral to Dermatology     Referral Priority:   Routine     Referral Type:   Consultation     Referral Reason:   Specialty Services Required     Requested Specialty:   Dermatology     Number of Visits Requested:   1   • Full Pulmonary Function Test & ABG Without Bronchodilator     Order Specific Question:   What type of PFT procedures would you like performed?     Answer:   Lung volumes     Order Specific Question:   What type of PFT procedures would you like performed?     Answer:   Spirometry       Follow up: 4 month(s)

## 2017-12-27 ENCOUNTER — HOSPITAL ENCOUNTER (EMERGENCY)
Age: 52
Discharge: HOME | End: 2017-12-27
Payer: MEDICAID

## 2017-12-27 VITALS
SYSTOLIC BLOOD PRESSURE: 141 MMHG | OXYGEN SATURATION: 98 % | TEMPERATURE: 98.8 F | RESPIRATION RATE: 18 BRPM | DIASTOLIC BLOOD PRESSURE: 90 MMHG | HEART RATE: 68 BPM

## 2017-12-27 DIAGNOSIS — G89.29 ACUTE EXACERBATION OF CHRONIC LOW BACK PAIN: Primary | ICD-10-CM

## 2017-12-27 DIAGNOSIS — M54.50 ACUTE EXACERBATION OF CHRONIC LOW BACK PAIN: Primary | ICD-10-CM

## 2017-12-27 PROCEDURE — 99282 EMERGENCY DEPT VISIT SF MDM: CPT | Performed by: PHYSICIAN ASSISTANT

## 2017-12-27 PROCEDURE — 6360000002 HC RX W HCPCS: Performed by: PHYSICIAN ASSISTANT

## 2017-12-27 PROCEDURE — 96372 THER/PROPH/DIAG INJ SC/IM: CPT

## 2017-12-27 PROCEDURE — 99282 EMERGENCY DEPT VISIT SF MDM: CPT

## 2017-12-27 RX ORDER — CYCLOBENZAPRINE HCL 10 MG
10 TABLET ORAL 3 TIMES DAILY PRN
Qty: 15 TABLET | Refills: 0 | Status: SHIPPED | OUTPATIENT
Start: 2017-12-27 | End: 2017-12-30

## 2017-12-27 RX ORDER — METHYLPREDNISOLONE SODIUM SUCCINATE 125 MG/2ML
125 INJECTION, POWDER, LYOPHILIZED, FOR SOLUTION INTRAMUSCULAR; INTRAVENOUS ONCE
Status: COMPLETED | OUTPATIENT
Start: 2017-12-27 | End: 2017-12-27

## 2017-12-27 RX ORDER — KETOROLAC TROMETHAMINE 30 MG/ML
30 INJECTION, SOLUTION INTRAMUSCULAR; INTRAVENOUS ONCE
Status: COMPLETED | OUTPATIENT
Start: 2017-12-27 | End: 2017-12-27

## 2017-12-27 RX ORDER — NAPROXEN 500 MG/1
500 TABLET ORAL 2 TIMES DAILY
Qty: 20 TABLET | Refills: 0 | Status: SHIPPED | OUTPATIENT
Start: 2017-12-27 | End: 2018-01-05 | Stop reason: ALTCHOICE

## 2017-12-27 RX ADMIN — KETOROLAC TROMETHAMINE 30 MG: 30 INJECTION, SOLUTION INTRAMUSCULAR at 10:48

## 2017-12-27 RX ADMIN — METHYLPREDNISOLONE SODIUM SUCCINATE 125 MG: 125 INJECTION, POWDER, FOR SOLUTION INTRAMUSCULAR; INTRAVENOUS at 10:48

## 2017-12-27 ASSESSMENT — PAIN DESCRIPTION - LOCATION: LOCATION: BACK

## 2017-12-27 ASSESSMENT — PAIN DESCRIPTION - PAIN TYPE: TYPE: CHRONIC PAIN

## 2017-12-27 ASSESSMENT — PAIN SCALES - GENERAL: PAINLEVEL_OUTOF10: 8

## 2017-12-27 NOTE — ED PROVIDER NOTES
Medication list, allergies, PMH, PSH reviewed. Constitutional: This patient is lying in bed in no acute distress. The patient is talking comfortably in complete sentences. Eyes: Pupils are equal, round, and reactive to light. Extraocular movements are intact. ENT/head: Normocephalic and atraumatic. Oropharynx is clear. Neck: Supple. Full ROM in neck without pain. No cspine tenderness. Respiratory:  Lungs are clear to auscultation bilaterally with normal effort. There are no wheezes, rhonchi or rales. Cardiovascular: The patient has a regular rate and rhythm. Gastrointestinal:  Soft, nontender, nondistended. There is no obvious hepatosplenomegaly. There is no rebound or guarding. The patient has normoactive bowel sounds. No masses. Musculoskeletal:  There is no cyanosis or edema. The patient has a full range of motion in all extremities without pain. Negative straight leg raise test bilaterally. No midline spinal tenderness of cervical, thoracic, lumbar, or sacral spine. Right and left lateral tenderness to lumbar region. Well-healed previous lumbar surgical scar to the back. No warmth or erythema palpated along spine. No evidence of cellulitis on back examination. No subcutaneous air palpated in back or along spine. Skin:  Warm and dry without rashes. Neurologic:  Alert and oriented x3. No focal neurological deficits. Patient able to ambulate without deficit. Sensation intact in all extremities. Strength 5/5 in knee extension/flexion, ankle dorsi/plantar flexion, and great toe flexion/extension bilaterally. Plantar reflex downgoing bilaterally. Assessment and Medical Decision Making:     The patient has no alarms signs, symptoms or physical examination findings that would warrant emergent MRI imaging at this time.  At this time there are no concerning symptoms including bowel or bladder incontinence, saddle anesthesia, fever, IV drug use, cancer, weakness,

## 2017-12-30 ENCOUNTER — HOSPITAL ENCOUNTER (EMERGENCY)
Age: 52
Discharge: HOME | End: 2017-12-30
Payer: MEDICAID

## 2017-12-30 VITALS
HEIGHT: 68 IN | DIASTOLIC BLOOD PRESSURE: 87 MMHG | TEMPERATURE: 98 F | HEART RATE: 76 BPM | SYSTOLIC BLOOD PRESSURE: 143 MMHG | WEIGHT: 155 LBS | RESPIRATION RATE: 18 BRPM | OXYGEN SATURATION: 96 % | BODY MASS INDEX: 23.49 KG/M2

## 2017-12-30 DIAGNOSIS — G89.29 ACUTE EXACERBATION OF CHRONIC LOW BACK PAIN: Primary | ICD-10-CM

## 2017-12-30 DIAGNOSIS — M54.50 ACUTE EXACERBATION OF CHRONIC LOW BACK PAIN: Primary | ICD-10-CM

## 2017-12-30 PROCEDURE — 96372 THER/PROPH/DIAG INJ SC/IM: CPT

## 2017-12-30 PROCEDURE — 99282 EMERGENCY DEPT VISIT SF MDM: CPT

## 2017-12-30 PROCEDURE — 6360000002 HC RX W HCPCS: Performed by: PHYSICIAN ASSISTANT

## 2017-12-30 PROCEDURE — 99282 EMERGENCY DEPT VISIT SF MDM: CPT | Performed by: PHYSICIAN ASSISTANT

## 2017-12-30 RX ORDER — CYCLOBENZAPRINE HCL 10 MG
10 TABLET ORAL 3 TIMES DAILY PRN
Qty: 15 TABLET | Refills: 0 | Status: SHIPPED | OUTPATIENT
Start: 2017-12-30 | End: 2018-01-05 | Stop reason: ALTCHOICE

## 2017-12-30 RX ORDER — TRAMADOL HYDROCHLORIDE 50 MG/1
50 TABLET ORAL EVERY 8 HOURS PRN
Qty: 15 TABLET | Refills: 0 | Status: SHIPPED | OUTPATIENT
Start: 2017-12-30 | End: 2018-01-05 | Stop reason: ALTCHOICE

## 2017-12-30 RX ORDER — KETOROLAC TROMETHAMINE 30 MG/ML
30 INJECTION, SOLUTION INTRAMUSCULAR; INTRAVENOUS ONCE
Status: COMPLETED | OUTPATIENT
Start: 2017-12-30 | End: 2017-12-30

## 2017-12-30 RX ADMIN — KETOROLAC TROMETHAMINE 30 MG: 30 INJECTION, SOLUTION INTRAMUSCULAR at 08:42

## 2017-12-30 ASSESSMENT — PAIN SCALES - GENERAL
PAINLEVEL_OUTOF10: 8
PAINLEVEL_OUTOF10: 8

## 2018-01-05 ENCOUNTER — HOSPITAL ENCOUNTER (EMERGENCY)
Age: 53
Discharge: HOME | End: 2018-01-05
Payer: MEDICAID

## 2018-01-05 VITALS
TEMPERATURE: 98 F | HEIGHT: 68 IN | DIASTOLIC BLOOD PRESSURE: 84 MMHG | OXYGEN SATURATION: 97 % | RESPIRATION RATE: 20 BRPM | BODY MASS INDEX: 23.49 KG/M2 | HEART RATE: 82 BPM | WEIGHT: 155 LBS | SYSTOLIC BLOOD PRESSURE: 131 MMHG

## 2018-01-05 DIAGNOSIS — G89.29 CHRONIC BILATERAL LOW BACK PAIN WITHOUT SCIATICA: Primary | ICD-10-CM

## 2018-01-05 DIAGNOSIS — M54.50 CHRONIC BILATERAL LOW BACK PAIN WITHOUT SCIATICA: Primary | ICD-10-CM

## 2018-01-05 PROCEDURE — 96372 THER/PROPH/DIAG INJ SC/IM: CPT

## 2018-01-05 PROCEDURE — 99283 EMERGENCY DEPT VISIT LOW MDM: CPT | Performed by: NURSE PRACTITIONER

## 2018-01-05 PROCEDURE — 99282 EMERGENCY DEPT VISIT SF MDM: CPT

## 2018-01-05 PROCEDURE — 6360000002 HC RX W HCPCS: Performed by: NURSE PRACTITIONER

## 2018-01-05 RX ORDER — KETOROLAC TROMETHAMINE 30 MG/ML
60 INJECTION, SOLUTION INTRAMUSCULAR; INTRAVENOUS ONCE
Status: COMPLETED | OUTPATIENT
Start: 2018-01-05 | End: 2018-01-05

## 2018-01-05 RX ORDER — BACLOFEN 10 MG/1
10 TABLET ORAL 3 TIMES DAILY
Qty: 9 TABLET | Refills: 0 | Status: SHIPPED | OUTPATIENT
Start: 2018-01-05 | End: 2018-01-08

## 2018-01-05 RX ADMIN — KETOROLAC TROMETHAMINE 60 MG: 30 INJECTION, SOLUTION INTRAMUSCULAR at 12:29

## 2018-01-05 ASSESSMENT — PAIN SCALES - GENERAL
PAINLEVEL_OUTOF10: 8
PAINLEVEL_OUTOF10: 8

## 2018-01-13 ENCOUNTER — HOSPITAL ENCOUNTER (EMERGENCY)
Age: 53
Discharge: HOME | End: 2018-01-13
Payer: MEDICAID

## 2018-01-13 VITALS
WEIGHT: 155 LBS | HEART RATE: 83 BPM | RESPIRATION RATE: 16 BRPM | HEIGHT: 68 IN | SYSTOLIC BLOOD PRESSURE: 146 MMHG | TEMPERATURE: 97.9 F | DIASTOLIC BLOOD PRESSURE: 89 MMHG | BODY MASS INDEX: 23.49 KG/M2 | OXYGEN SATURATION: 97 %

## 2018-01-13 DIAGNOSIS — G89.29 CHRONIC BILATERAL LOW BACK PAIN WITHOUT SCIATICA: Primary | ICD-10-CM

## 2018-01-13 DIAGNOSIS — M54.50 CHRONIC BILATERAL LOW BACK PAIN WITHOUT SCIATICA: Primary | ICD-10-CM

## 2018-01-13 PROCEDURE — 99283 EMERGENCY DEPT VISIT LOW MDM: CPT | Performed by: NURSE PRACTITIONER

## 2018-01-13 PROCEDURE — 96372 THER/PROPH/DIAG INJ SC/IM: CPT

## 2018-01-13 PROCEDURE — 99282 EMERGENCY DEPT VISIT SF MDM: CPT

## 2018-01-13 PROCEDURE — 6370000000 HC RX 637 (ALT 250 FOR IP): Performed by: NURSE PRACTITIONER

## 2018-01-13 PROCEDURE — 6360000002 HC RX W HCPCS: Performed by: NURSE PRACTITIONER

## 2018-01-13 RX ORDER — LIDOCAINE 50 MG/G
1 PATCH TOPICAL DAILY
Status: DISCONTINUED | OUTPATIENT
Start: 2018-01-13 | End: 2018-01-13 | Stop reason: HOSPADM

## 2018-01-13 RX ORDER — NAPROXEN 500 MG/1
500 TABLET ORAL 2 TIMES DAILY
Qty: 30 TABLET | Refills: 0 | Status: SHIPPED | OUTPATIENT
Start: 2018-01-13 | End: 2018-05-21

## 2018-01-13 RX ORDER — LIDOCAINE 50 MG/G
1 PATCH TOPICAL DAILY
Qty: 10 PATCH | Refills: 0 | Status: SHIPPED | OUTPATIENT
Start: 2018-01-13 | End: 2018-05-21

## 2018-01-13 RX ORDER — KETOROLAC TROMETHAMINE 30 MG/ML
60 INJECTION, SOLUTION INTRAMUSCULAR; INTRAVENOUS ONCE
Status: COMPLETED | OUTPATIENT
Start: 2018-01-13 | End: 2018-01-13

## 2018-01-13 RX ORDER — ACETAMINOPHEN 500 MG
500 TABLET ORAL EVERY 6 HOURS PRN
COMMUNITY
End: 2018-05-21

## 2018-01-13 RX ORDER — IBUPROFEN 200 MG
200 TABLET ORAL EVERY 6 HOURS PRN
COMMUNITY
End: 2018-10-15

## 2018-01-13 RX ADMIN — KETOROLAC TROMETHAMINE 60 MG: 30 INJECTION, SOLUTION INTRAMUSCULAR at 08:42

## 2018-01-13 ASSESSMENT — PAIN SCALES - GENERAL
PAINLEVEL_OUTOF10: 9
PAINLEVEL_OUTOF10: 9

## 2018-01-13 ASSESSMENT — PAIN DESCRIPTION - ORIENTATION: ORIENTATION: LOWER

## 2018-01-13 ASSESSMENT — PAIN DESCRIPTION - LOCATION: LOCATION: BACK

## 2018-01-13 ASSESSMENT — ENCOUNTER SYMPTOMS
GASTROINTESTINAL NEGATIVE: 1
RESPIRATORY NEGATIVE: 1
BACK PAIN: 1

## 2018-01-13 ASSESSMENT — PAIN DESCRIPTION - PAIN TYPE: TYPE: CHRONIC PAIN

## 2018-01-13 NOTE — ED PROVIDER NOTES
ibuprofen (ADVIL;MOTRIN) 200 MG tablet Take 200 mg by mouth every 6 hours as needed for PainHistorical Med      acetaminophen (TYLENOL) 500 MG tablet Take 500 mg by mouth every 6 hours as needed for PainHistorical Med             ALLERGIES     Review of patient's allergies indicates no known allergies. FAMILY HISTORY     History reviewed. No pertinent family history. SOCIAL HISTORY       Social History     Social History    Marital status:      Spouse name: N/A    Number of children: N/A    Years of education: N/A     Social History Main Topics    Smoking status: Current Every Day Smoker     Packs/day: 1.00     Types: Cigarettes    Smokeless tobacco: Never Used    Alcohol use No    Drug use: No    Sexual activity: Not Asked     Other Topics Concern    None     Social History Narrative    None       SCREENINGS           PHYSICAL EXAM    (up to 7 for level 4, 8 or more for level 5)     ED Triage Vitals [01/13/18 0745]   BP Temp Temp Source Pulse Resp SpO2 Height Weight   (!) 146/89 97.9 °F (36.6 °C) Oral 83 16 97 % 5' 8\" (1.727 m) 155 lb (70.3 kg)       Physical Exam   Constitutional: He is oriented to person, place, and time. He appears well-developed and well-nourished. HENT:   Head: Normocephalic and atraumatic. Right Ear: External ear normal.   Left Ear: External ear normal.   Nose: Nose normal.   Mouth/Throat: Oropharynx is clear and moist.   Eyes: Conjunctivae and EOM are normal. Pupils are equal, round, and reactive to light. Neck: Normal range of motion. Neck supple. Cardiovascular: Normal rate, regular rhythm, normal heart sounds and intact distal pulses. Pulmonary/Chest: Effort normal and breath sounds normal.   Abdominal: Soft. Bowel sounds are normal.   Musculoskeletal: He exhibits no edema, tenderness or deformity. Lumbar back: He exhibits pain. He exhibits no tenderness, no bony tenderness, no swelling, no edema, no deformity and no spasm. Back:    Neurological: He is alert and oriented to person, place, and time. Skin: Skin is warm and dry. Psychiatric: He has a normal mood and affect. Vitals reviewed. DIAGNOSTIC RESULTS     RADIOLOGY:   Non-plain film images such as CT, Ultrasound and MRI are read by the radiologist. Plain radiographic images are visualized and preliminarily interpreted by No att. providers found with the below findings:      Interpretation per the Radiologist below, if available at the time of this note:    No orders to display       LABS:  Labs Reviewed - No data to display    All other labs were within normal range or not returned as of this dictation. RE-ASSESSMENT        EMERGENCY DEPARTMENT COURSE and DIFFERENTIAL DIAGNOSIS/MDM:   Vitals:    Vitals:    01/13/18 0745   BP: (!) 146/89   Pulse: 83   Resp: 16   Temp: 97.9 °F (36.6 °C)   TempSrc: Oral   SpO2: 97%   Weight: 155 lb (70.3 kg)   Height: 5' 8\" (1.727 m)         MDM  Number of Diagnoses or Management Options  Chronic bilateral low back pain without sciatica: established, worsening  Diagnosis management comments: Diagnosis management comments:   46year old male presents to the emergency room with complaints of chronic low back. He has been seen multiple times with the same complaint. He has also recently seen Dr. Michelle Salazar for complaint of a skin lesion. He was referred to dermatology and scheduled to have a pulmonary functions test for an abnormal chest xray. During this visit, he requested pain medication for chronic low back pain and requested a written document stating he does not take opiates for chronic use. The patient was given a prescription for 7 days of Percocet. Spoke with the patient about physical therapy, TENS unit, surgical interventions.  Patient tells me that he does not want to have surgery again because \"it did not help the last time I had surgery\"; he does not want to try a TENS unit because \"I heard they can corrode in there\"; and he advises me that he has already done physical therapy. Case discussed with Dr. Ivanna Crouch. Patient will be given Toradol and Lidoderm patch will be placed while in the emergency department. I do not feel the patient requires any xrays or a cat scan today due to he denies a new injury and denies any neuro deficits, saddle anesthesia or changes in the pain. Patient will be discharged with prescription for Naprosyn and Lidoderm patch. He was instructed to follow-up with Dr. Yael Aldridge as soon as possible and to check the status of his referral to Dr. Ilda Hernández. He was also instructed to return to the emergency department if he develops any neuro deficits or is unable to ambulate. Emergency Room Treatment Plan:  The patient was evaluated. Case discussed with Dr. Ivanna Crouch. Toradol will be given. Lidoderm patch will be applied. The patient appears to be clinically well. Patient was again instructed to follow up with their primary care physician if needed and return to the ER if their symptoms become worse. Patient ambulated from the emergency room without any difficulty. Procedures      FINAL IMPRESSION      1.  Chronic bilateral low back pain without sciatica          DISPOSITION/PLAN   DISPOSITION Decision To Discharge 01/13/2018 08:58:57 AM      PATIENT REFERRED TO:  Anshu Toure MD  [facility]  [phone]    Schedule an appointment as soon as possible for a visit       [facility] EMERGENCY DEPT  [facility]  [phone]    As needed, If symptoms worsen      DISCHARGE MEDICATIONS:  Discharge Medication List as of 1/13/2018  8:33 AM      START taking these medications    Details   naproxen (NAPROSYN) 500 MG tablet Take 1 tablet by mouth 2 times daily, Disp-30 tablet, R-0Print      lidocaine (LIDODERM) 5 % Place 1 patch onto the skin daily 12 hours on, 12 hours off., Disp-10 patch, R-0Print             (Please note that portions of this note were completed with a

## 2018-01-13 NOTE — ED NOTES
Pt was seen here 1/5 for same complaint. He states that the muscle relaxer we prescribed isn't working. He states that he cannot get in to see his dr until the end of the month.      Анна Vasquez RN  01/13/18 7802

## 2018-01-17 ENCOUNTER — HOSPITAL ENCOUNTER (EMERGENCY)
Facility: HOSPITAL | Age: 53
Discharge: HOME | End: 2018-01-17
Admitting: EMERGENCY MEDICINE

## 2018-01-17 ENCOUNTER — APPOINTMENT (OUTPATIENT)
Dept: GENERAL RADIOLOGY | Facility: HOSPITAL | Age: 53
End: 2018-01-17

## 2018-01-17 VITALS
HEART RATE: 76 BPM | TEMPERATURE: 99.1 F | DIASTOLIC BLOOD PRESSURE: 90 MMHG | RESPIRATION RATE: 18 BRPM | OXYGEN SATURATION: 99 % | HEIGHT: 68 IN | SYSTOLIC BLOOD PRESSURE: 124 MMHG | BODY MASS INDEX: 24.25 KG/M2 | WEIGHT: 160 LBS

## 2018-01-17 DIAGNOSIS — M54.42 CHRONIC MIDLINE LOW BACK PAIN WITH LEFT-SIDED SCIATICA: Primary | ICD-10-CM

## 2018-01-17 DIAGNOSIS — G89.29 CHRONIC MIDLINE LOW BACK PAIN WITH LEFT-SIDED SCIATICA: Primary | ICD-10-CM

## 2018-01-17 PROCEDURE — 99283 EMERGENCY DEPT VISIT LOW MDM: CPT

## 2018-01-17 PROCEDURE — 25010000002 METHYLPREDNISOLONE PER 125 MG: Performed by: PHYSICIAN ASSISTANT

## 2018-01-17 PROCEDURE — 96372 THER/PROPH/DIAG INJ SC/IM: CPT

## 2018-01-17 RX ORDER — METHYLPREDNISOLONE SODIUM SUCCINATE 125 MG/2ML
125 INJECTION, POWDER, LYOPHILIZED, FOR SOLUTION INTRAMUSCULAR; INTRAVENOUS ONCE
Status: COMPLETED | OUTPATIENT
Start: 2018-01-17 | End: 2018-01-17

## 2018-01-17 RX ORDER — METHYLPREDNISOLONE 4 MG/1
TABLET ORAL
Qty: 21 TABLET | Refills: 0 | OUTPATIENT
Start: 2018-01-17 | End: 2019-07-10

## 2018-01-17 RX ADMIN — METHYLPREDNISOLONE SODIUM SUCCINATE 125 MG: 125 INJECTION, POWDER, FOR SOLUTION INTRAMUSCULAR; INTRAVENOUS at 11:56

## 2018-01-17 NOTE — ED PROVIDER NOTES
Subjective   HPI Comments: The patient has chronic back pain for which he had a prior surgery and used to see pain management.  He has been to [facility] mul;tiple times in the past 2 months for this pain.  He states that Ibuprofen has not helped.  He is trying to reschedule his appt with Dr. Leonardo as he was in senior care and missed his prior appt this summer.  He states that his pain has not changed or worsened at all, but he has chronic pain that is not controlled.  He does state that at times he has pain radiating down his left leg and it has started again a few days ago    Patient is a 52 y.o. male presenting with back pain.   History provided by:  Patient   used: No    Back Pain   Location:  Lumbar spine  Quality:  Aching  Radiates to:  L posterior upper leg  Pain severity:  Moderate  Pain is:  Same all the time  Onset quality:  Gradual  Duration:  12 months  Timing:  Constant  Progression:  Unchanged  Chronicity:  Chronic  Context: not emotional stress, not falling, not jumping from heights, not MVA, not recent illness, not recent injury and not twisting    Relieved by:  Nothing  Worsened by:  Nothing  Ineffective treatments:  NSAIDs and OTC medications  Associated symptoms: leg pain    Associated symptoms: no abdominal pain, no bladder incontinence, no bowel incontinence, no numbness, no paresthesias, no pelvic pain, no perianal numbness and no weakness        Review of Systems   Constitutional: Negative.    HENT: Negative.    Eyes: Negative.    Cardiovascular: Negative.    Gastrointestinal: Negative.  Negative for abdominal pain and bowel incontinence.   Endocrine: Negative.    Genitourinary: Negative.  Negative for bladder incontinence and pelvic pain.   Musculoskeletal: Positive for back pain. Negative for arthralgias and joint swelling.   Skin: Negative.    Neurological: Negative for weakness, numbness and paresthesias.   Psychiatric/Behavioral: Negative.        Past Medical History:    Diagnosis Date   • GERD (gastroesophageal reflux disease)    • Injury of back    • Prostate CA        No Known Allergies    Past Surgical History:   Procedure Laterality Date   • BACK SURGERY      Patient has had 2 previous back surgeries.    • PROSTATE SURGERY         Family History   Problem Relation Age of Onset   • Arthritis Mother    • Heart disease Father    • Cancer Father    • No Known Problems Sister    • No Known Problems Brother    • No Known Problems Brother        Social History     Social History   • Marital status:      Spouse name: N/A   • Number of children: N/A   • Years of education: N/A     Social History Main Topics   • Smoking status: Current Every Day Smoker     Packs/day: 1.00     Types: Cigarettes   • Smokeless tobacco: Never Used   • Alcohol use No   • Drug use: No   • Sexual activity: Defer     Other Topics Concern   • None     Social History Narrative   • None           Objective   Physical Exam   Constitutional: He is oriented to person, place, and time. He appears well-developed and well-nourished. No distress.   HENT:   Head: Normocephalic and atraumatic.   Eyes: EOM are normal. Pupils are equal, round, and reactive to light.   Cardiovascular: Normal rate, regular rhythm and normal heart sounds.  Exam reveals no friction rub.    No murmur heard.  Pulmonary/Chest: Effort normal and breath sounds normal. No respiratory distress. He has no wheezes. He has no rales.   Musculoskeletal:        Lumbar back: He exhibits tenderness and pain. He exhibits normal range of motion, no bony tenderness, no swelling, no edema and no spasm.        Back:    Neurological: He is alert and oriented to person, place, and time. No cranial nerve deficit. Coordination normal.   Skin: Skin is warm and dry. No rash noted. He is not diaphoretic. No erythema.   Nursing note and vitals reviewed.      Procedures         ED Course  ED Course   Comment By Time   Discussed importance of follow up for continued  evaluation of chroni cback pain, but discussed that we could not treat his chronic pain with narcotics in ED.  He voiced understanding, but would like to try anything for his pain.  With recent pain down his leg, will try short steroid dose.  Advised to follow up with PCP as soon as available Rubio Young PA-C 01/17 1200                  MDM    Final diagnoses:   Chronic midline low back pain with left-sided sciatica            Rubio Young PA-C  01/17/18 2944

## 2018-01-17 NOTE — ED NOTES
C/o's 'back pain for past month & half, hurts worse.' reports surgery of fusion of L5-S1 about 5-6 years ago [facility]     Latha Kline RN  01/17/18 2372

## 2018-01-21 ASSESSMENT — ENCOUNTER SYMPTOMS
RESPIRATORY NEGATIVE: 1
BACK PAIN: 1

## 2018-01-22 ENCOUNTER — HOSPITAL ENCOUNTER (EMERGENCY)
Facility: HOSPITAL | Age: 53
Discharge: HOME | End: 2018-01-22
Admitting: FAMILY MEDICINE

## 2018-01-22 ENCOUNTER — APPOINTMENT (OUTPATIENT)
Dept: GENERAL RADIOLOGY | Facility: HOSPITAL | Age: 53
End: 2018-01-22

## 2018-01-22 VITALS
WEIGHT: 158 LBS | HEIGHT: 68 IN | SYSTOLIC BLOOD PRESSURE: 121 MMHG | BODY MASS INDEX: 23.95 KG/M2 | RESPIRATION RATE: 16 BRPM | OXYGEN SATURATION: 98 % | TEMPERATURE: 97.9 F | DIASTOLIC BLOOD PRESSURE: 78 MMHG | HEART RATE: 73 BPM

## 2018-01-22 DIAGNOSIS — K21.9 GASTROESOPHAGEAL REFLUX DISEASE, ESOPHAGITIS PRESENCE NOT SPECIFIED: ICD-10-CM

## 2018-01-22 DIAGNOSIS — R07.89 OTHER CHEST PAIN: Primary | ICD-10-CM

## 2018-01-22 LAB
ALBUMIN SERPL-MCNC: 4.3 G/DL (ref 3.5–5)
ALBUMIN/GLOB SERPL: 1.3 G/DL (ref 1.1–2.5)
ALP SERPL-CCNC: 120 U/L (ref 24–120)
ALT SERPL W P-5'-P-CCNC: 41 U/L (ref 0–54)
AMYLASE SERPL-CCNC: 61 U/L (ref 30–110)
ANION GAP SERPL CALCULATED.3IONS-SCNC: 10 MMOL/L (ref 4–13)
AST SERPL-CCNC: 20 U/L (ref 7–45)
BASOPHILS # BLD AUTO: 0.04 10*3/MM3 (ref 0–0.2)
BASOPHILS NFR BLD AUTO: 0.4 % (ref 0–2)
BILIRUB SERPL-MCNC: 0.4 MG/DL (ref 0.1–1)
BUN BLD-MCNC: 6 MG/DL (ref 5–21)
BUN/CREAT SERPL: 7.7 (ref 7–25)
CALCIUM SPEC-SCNC: 9.2 MG/DL (ref 8.4–10.4)
CHLORIDE SERPL-SCNC: 99 MMOL/L (ref 98–110)
CO2 SERPL-SCNC: 30 MMOL/L (ref 24–31)
CREAT BLD-MCNC: 0.78 MG/DL (ref 0.5–1.4)
DEPRECATED RDW RBC AUTO: 39.3 FL (ref 40–54)
EOSINOPHIL # BLD AUTO: 0.18 10*3/MM3 (ref 0–0.7)
EOSINOPHIL NFR BLD AUTO: 1.7 % (ref 0–4)
ERYTHROCYTE [DISTWIDTH] IN BLOOD BY AUTOMATED COUNT: 12.7 % (ref 12–15)
GFR SERPL CREATININE-BSD FRML MDRD: 105 ML/MIN/1.73
GLOBULIN UR ELPH-MCNC: 3.4 GM/DL
GLUCOSE BLD-MCNC: 100 MG/DL (ref 70–100)
HCT VFR BLD AUTO: 43.6 % (ref 40–52)
HGB BLD-MCNC: 15.7 G/DL (ref 14–18)
HOLD SPECIMEN: NORMAL
HOLD SPECIMEN: NORMAL
IMM GRANULOCYTES # BLD: 0.04 10*3/MM3 (ref 0–0.03)
IMM GRANULOCYTES NFR BLD: 0.4 % (ref 0–5)
INR PPP: 0.88 (ref 0.91–1.09)
LIPASE SERPL-CCNC: 45 U/L (ref 23–203)
LYMPHOCYTES # BLD AUTO: 3.21 10*3/MM3 (ref 0.72–4.86)
LYMPHOCYTES NFR BLD AUTO: 30.3 % (ref 15–45)
MCH RBC QN AUTO: 30.7 PG (ref 28–32)
MCHC RBC AUTO-ENTMCNC: 36 G/DL (ref 33–36)
MCV RBC AUTO: 85.2 FL (ref 82–95)
MONOCYTES # BLD AUTO: 0.76 10*3/MM3 (ref 0.19–1.3)
MONOCYTES NFR BLD AUTO: 7.2 % (ref 4–12)
NEUTROPHILS # BLD AUTO: 6.35 10*3/MM3 (ref 1.87–8.4)
NEUTROPHILS NFR BLD AUTO: 60 % (ref 39–78)
NRBC BLD MANUAL-RTO: 0 /100 WBC (ref 0–0)
PLATELET # BLD AUTO: 214 10*3/MM3 (ref 130–400)
PMV BLD AUTO: 11 FL (ref 6–12)
POTASSIUM BLD-SCNC: 3.5 MMOL/L (ref 3.5–5.3)
PROT SERPL-MCNC: 7.7 G/DL (ref 6.3–8.7)
PROTHROMBIN TIME: 12.2 SECONDS (ref 11.9–14.6)
RBC # BLD AUTO: 5.12 10*6/MM3 (ref 4.8–5.9)
SODIUM BLD-SCNC: 139 MMOL/L (ref 135–145)
TROPONIN I SERPL-MCNC: <0.012 NG/ML (ref 0–0.03)
TROPONIN I SERPL-MCNC: <0.012 NG/ML (ref 0–0.03)
WBC NRBC COR # BLD: 10.58 10*3/MM3 (ref 4.8–10.8)
WHOLE BLOOD HOLD SPECIMEN: NORMAL
WHOLE BLOOD HOLD SPECIMEN: NORMAL

## 2018-01-22 PROCEDURE — 80053 COMPREHEN METABOLIC PANEL: CPT

## 2018-01-22 PROCEDURE — 96374 THER/PROPH/DIAG INJ IV PUSH: CPT

## 2018-01-22 PROCEDURE — 96361 HYDRATE IV INFUSION ADD-ON: CPT

## 2018-01-22 PROCEDURE — 85610 PROTHROMBIN TIME: CPT

## 2018-01-22 PROCEDURE — 93010 ELECTROCARDIOGRAM REPORT: CPT | Performed by: INTERNAL MEDICINE

## 2018-01-22 PROCEDURE — 82150 ASSAY OF AMYLASE: CPT | Performed by: PHYSICIAN ASSISTANT

## 2018-01-22 PROCEDURE — 84484 ASSAY OF TROPONIN QUANT: CPT

## 2018-01-22 PROCEDURE — 71045 X-RAY EXAM CHEST 1 VIEW: CPT

## 2018-01-22 PROCEDURE — 83690 ASSAY OF LIPASE: CPT | Performed by: PHYSICIAN ASSISTANT

## 2018-01-22 PROCEDURE — 99284 EMERGENCY DEPT VISIT MOD MDM: CPT

## 2018-01-22 PROCEDURE — 85025 COMPLETE CBC W/AUTO DIFF WBC: CPT

## 2018-01-22 PROCEDURE — 93005 ELECTROCARDIOGRAM TRACING: CPT

## 2018-01-22 RX ORDER — MAGNESIUM HYDROXIDE/ALUMINUM HYDROXICE/SIMETHICONE 120; 1200; 1200 MG/30ML; MG/30ML; MG/30ML
10 SUSPENSION ORAL ONCE
Status: DISCONTINUED | OUTPATIENT
Start: 2018-01-22 | End: 2018-01-22

## 2018-01-22 RX ORDER — SODIUM CHLORIDE 9 MG/ML
125 INJECTION, SOLUTION INTRAVENOUS CONTINUOUS
Status: DISCONTINUED | OUTPATIENT
Start: 2018-01-22 | End: 2018-01-22 | Stop reason: HOSPADM

## 2018-01-22 RX ORDER — ALUMINA, MAGNESIA, AND SIMETHICONE 2400; 2400; 240 MG/30ML; MG/30ML; MG/30ML
10 SUSPENSION ORAL ONCE
Status: COMPLETED | OUTPATIENT
Start: 2018-01-22 | End: 2018-01-22

## 2018-01-22 RX ORDER — ASPIRIN 81 MG/1
324 TABLET, CHEWABLE ORAL ONCE
Status: COMPLETED | OUTPATIENT
Start: 2018-01-22 | End: 2018-01-22

## 2018-01-22 RX ORDER — FAMOTIDINE 10 MG/ML
40 INJECTION, SOLUTION INTRAVENOUS ONCE
Status: COMPLETED | OUTPATIENT
Start: 2018-01-22 | End: 2018-01-22

## 2018-01-22 RX ORDER — ESOMEPRAZOLE MAGNESIUM 40 MG/1
40 CAPSULE, DELAYED RELEASE ORAL
Qty: 60 CAPSULE | Refills: 0 | Status: SHIPPED | OUTPATIENT
Start: 2018-01-22 | End: 2020-01-07

## 2018-01-22 RX ORDER — SODIUM CHLORIDE 0.9 % (FLUSH) 0.9 %
10 SYRINGE (ML) INJECTION AS NEEDED
Status: DISCONTINUED | OUTPATIENT
Start: 2018-01-22 | End: 2018-01-22 | Stop reason: HOSPADM

## 2018-01-22 RX ORDER — FAMOTIDINE 40 MG/1
40 TABLET, FILM COATED ORAL NIGHTLY
Qty: 30 TABLET | Refills: 0 | Status: SHIPPED | OUTPATIENT
Start: 2018-01-22 | End: 2019-12-09

## 2018-01-22 RX ORDER — ALUMINUM HYDROXIDE, MAGNESIUM HYDROXIDE, SIMETHICONE 400; 400; 40 MG/10ML; MG/10ML; MG/10ML
30 SUSPENSION ORAL
Qty: 1120 ML | Refills: 0 | Status: SHIPPED | OUTPATIENT
Start: 2018-01-22 | End: 2020-01-07

## 2018-01-22 RX ADMIN — SODIUM CHLORIDE 125 ML/HR: 9 INJECTION, SOLUTION INTRAVENOUS at 15:36

## 2018-01-22 RX ADMIN — FAMOTIDINE 40 MG: 10 INJECTION, SOLUTION INTRAVENOUS at 16:17

## 2018-01-22 RX ADMIN — ASPIRIN 81 MG 324 MG: 81 TABLET ORAL at 15:25

## 2018-01-22 RX ADMIN — ALUMINUM HYDROXIDE, MAGNESIUM HYDROXIDE, AND DIMETHICONE 10 ML: 400; 400; 40 SUSPENSION ORAL at 17:26

## 2018-01-22 NOTE — ED PROVIDER NOTES
"Subjective   Patient is a 52 y.o. male presenting with chest pain.   Chest Pain   Associated symptoms: shortness of breath    Associated symptoms: no cough, no fatigue, no fever and no palpitations      Patient is a 52-year-old  male with chief complaint of chest discomfort.  Patient describes experiencing \"heartburn\" for the past 2 evenings.  Today as he was working with [employer] parts, he noted pain in his chest.  He describes it as \"actually really hard to describe... it just hurts.\"  At its worst, he rates it as moderate in discomfort.  He complains of pain radiating down his left upper extremity.  He denies any radiating pain elsewhere.  He denies any nausea, vomiting, or diaphoresis with his pain presently.  He has a history chronic shortness breath secondary to prolonged smoking.  He denies it being any different.  He has similar discomfort about 2 months ago whenever he came to this ED 3 months ago for chest pain.  He completed a stress test while here which was negative.  He was advised to follow up with a gastroenterologist but the patient has not done so.    With this particular discomfort, he is not taking any medications to alleviate his pain.  He denies any illicit drug use.  He denies any decongestants or stimulants.  The patient does have a history of hypercholesterolemia where admits that he is noncompliant with his medications since it hurt his body.  He does have a significant family cardiac history as well.  He denies being diabetic or having hypertension.    Review of Systems   Constitutional: Negative for activity change, appetite change, chills, fatigue and fever.   HENT: Negative.    Respiratory: Positive for shortness of breath. Negative for cough, wheezing and stridor.    Cardiovascular: Positive for chest pain. Negative for palpitations and leg swelling.   Gastrointestinal: Negative.    Genitourinary: Negative.    Musculoskeletal: Negative.    Neurological: Negative.  " "  Psychiatric/Behavioral: Negative.        Past Medical History:   Diagnosis Date   • GERD (gastroesophageal reflux disease)    • Injury of back    • Prostate CA        No Known Allergies    Past Surgical History:   Procedure Laterality Date   • BACK SURGERY      Patient has had 2 previous back surgeries.    • PROSTATE SURGERY         Family History   Problem Relation Age of Onset   • Arthritis Mother    • Heart disease Father    • Cancer Father    • No Known Problems Sister    • No Known Problems Brother    • No Known Problems Brother        Social History     Social History   • Marital status:      Spouse name: N/A   • Number of children: N/A   • Years of education: N/A     Social History Main Topics   • Smoking status: Current Every Day Smoker     Packs/day: 1.00     Types: Cigarettes   • Smokeless tobacco: Never Used   • Alcohol use No   • Drug use: No   • Sexual activity: Defer     Other Topics Concern   • None     Social History Narrative       Prior to Admission medications    Medication Sig Start Date End Date Taking? Authorizing Provider   famotidine (PEPCID) 40 MG tablet Take 1 tablet by mouth Daily. 10/12/17   Lawrence Robertson MD   MethylPREDNISolone (MEDROL, LAMIN,) 4 MG tablet Take as directed on package instructions. 1/17/18   Rubio Young PA-C       Medications   sodium chloride 0.9 % flush 10 mL (not administered)   sodium chloride 0.9 % infusion (125 mL/hr Intravenous New Bag 1/22/18 1536)   aspirin chewable tablet 324 mg (324 mg Oral Given 1/22/18 1525)   famotidine (PEPCID) injection 40 mg (40 mg Intravenous Given 1/22/18 1617)   aluminum-magnesium hydroxide-simethicone (MAALOX MAX) 400-400-40 MG/5ML suspension 10 mL (10 mL Oral Given 1/22/18 1726)       /76  Pulse 73  Temp 97.9 °F (36.6 °C)  Resp 18  Ht 172.7 cm (68\")  Wt 71.7 kg (158 lb)  SpO2 97%  BMI 24.02 kg/m2      Objective   Physical Exam   Constitutional: He is oriented to person, place, and time. He appears " well-developed and well-nourished.  Non-toxic appearance. No distress.   HENT:   Head: Normocephalic and atraumatic.   Nose: Nose normal.   Mouth/Throat: Uvula is midline, oropharynx is clear and moist and mucous membranes are normal.   Eyes: Conjunctivae, EOM and lids are normal. Pupils are equal, round, and reactive to light. Lids are everted and swept, no foreign bodies found.   Neck: Trachea normal, full passive range of motion without pain and phonation normal. Neck supple. Normal carotid pulses and no JVD present. Carotid bruit is not present. No rigidity.   Cardiovascular: Normal rate, regular rhythm, normal heart sounds, intact distal pulses and normal pulses.    Pulmonary/Chest: Effort normal and breath sounds normal. No stridor. No apnea and no tachypnea. No respiratory distress. He has no wheezes. He has no rales. He exhibits no tenderness.   Abdominal: Soft. Normal appearance, normal aorta and bowel sounds are normal. There is no hepatosplenomegaly. No hernia.   Musculoskeletal: Normal range of motion. He exhibits no edema, tenderness or deformity.       Vascular Status -  His exam exhibits right foot vasculature normal. His exam exhibits no right foot edema. His exam exhibits left foot vasculature normal. His exam exhibits no left foot edema.  Neurological: He is alert and oriented to person, place, and time. He has normal strength and normal reflexes. He displays normal reflexes. No cranial nerve deficit or sensory deficit. Coordination and gait normal. GCS eye subscore is 4. GCS verbal subscore is 5. GCS motor subscore is 6.   Skin: Skin is warm, dry and intact. He is not diaphoretic. No pallor.   Psychiatric: He has a normal mood and affect. His speech is normal and behavior is normal.   Nursing note and vitals reviewed.      Procedures         Lab Results (last 24 hours)     Procedure Component Value Units Date/Time    CBC & Differential [[id number]] Collected:  01/22/18 6945    Specimen:  Blood  Updated:  01/22/18 1550    Narrative:       The following orders were created for panel order CBC & Differential.  Procedure                               Abnormality         Status                     ---------                               -----------         ------                     CBC Auto Differential[[id number]]        Abnormal            Final result                 Please view results for these tests on the individual orders.    Comprehensive Metabolic Panel [[id number]] Collected:  01/22/18 1532    Specimen:  Blood Updated:  01/22/18 1559     Glucose 100 mg/dL      BUN 6 mg/dL      Creatinine 0.78 mg/dL      Sodium 139 mmol/L      Potassium 3.5 mmol/L      Chloride 99 mmol/L      CO2 30.0 mmol/L      Calcium 9.2 mg/dL      Total Protein 7.7 g/dL      Albumin 4.30 g/dL      ALT (SGPT) 41 U/L      AST (SGOT) 20 U/L      Alkaline Phosphatase 120 U/L      Total Bilirubin 0.4 mg/dL      eGFR Non African Amer 105 mL/min/1.73      Globulin 3.4 gm/dL      A/G Ratio 1.3 g/dL      BUN/Creatinine Ratio 7.7     Anion Gap 10.0 mmol/L     Troponin [[id number]]  (Normal) Collected:  01/22/18 1532    Specimen:  Blood Updated:  01/22/18 1610     Troponin I <0.012 ng/mL     Protime-INR [[id number]]  (Abnormal) Collected:  01/22/18 1532    Specimen:  Blood Updated:  01/22/18 1605     Protime 12.2 Seconds      INR 0.88 (L)    CBC Auto Differential [[id number]]  (Abnormal) Collected:  01/22/18 1532    Specimen:  Blood Updated:  01/22/18 1550     WBC 10.58 10*3/mm3      RBC 5.12 10*6/mm3      Hemoglobin 15.7 g/dL      Hematocrit 43.6 %      MCV 85.2 fL      MCH 30.7 pg      MCHC 36.0 g/dL      RDW 12.7 %      RDW-SD 39.3 (L) fl      MPV 11.0 fL      Platelets 214 10*3/mm3      Neutrophil % 60.0 %      Lymphocyte % 30.3 %      Monocyte % 7.2 %      Eosinophil % 1.7 %      Basophil % 0.4 %      Immature Grans % 0.4 %      Neutrophils, Absolute 6.35 10*3/mm3      Lymphocytes, Absolute 3.21 10*3/mm3      Monocytes, Absolute 0.76  10*3/mm3      Eosinophils, Absolute 0.18 10*3/mm3      Basophils, Absolute 0.04 10*3/mm3      Immature Grans, Absolute 0.04 (H) 10*3/mm3      nRBC 0.0 /100 WBC     Lipase [[id number]]  (Normal) Collected:  18 1532    Specimen:  Blood Updated:  18 1559     Lipase 45 U/L     Amylase [[id number]]  (Normal) Collected:  18 1532    Specimen:  Blood Updated:  18 1559     Amylase 61 U/L     Troponin [[id number]]  (Normal) Collected:  18 1831    Specimen:  Blood Updated:  18 1859     Troponin I <0.012 ng/mL           Xr Chest 1 View    Result Date: 2018  Narrative: EXAMINATION: XR CHEST 1 VW-. 2018 4:53 PM EST  CHEST, ONE VIEW:  HISTORY: Chest pain  COMPARISON: 2017, 2013 and 2013  A single frontal chest radiograph was obtained.  FINDINGS:  Calcified granuloma appreciated the right upper lobe.  The lungs are clear without acute infiltrates. The heart is normal in size, pulmonary circulation appropriate, without heart failure.  The bony structures are intact.  Radiographically, chest is unchanged.                                  Impression: 1. No acute cardiopulmonary process.  This report was finalized on 2018 15:54 by Dr. Ezra Sandoval MD.      ED Course  ED Course         HEART Score (for prediction of 6-week risk of major adverse cardiac event) reviewed and/or performed as part of the patient evaluation and treatment planning process.  The result associated with this review/performance is: 1    [facility] CARDIOLOG[facility]  [address] [place] [id number] [phone]             Magalie SYED Myers   Stress Echocardiogram   Order# [id number]   Reading physician: Surjit John MD Ordering physician: Lawrence Wu Jr., MD Study date: 10/11/17   Patient Information   Patient Name MRN Sex  (Age)   Magalie Myers [id number] Male 1965 (52 y.o.)   Patient Height & Weight   Height Weight BSA (Calculated - sq m) BMI (kg/m2) Pulse           Patient Vitals   BP Pulse       Reason For Exam   Chest Pain   Dx: Chest pain, unspecified type [R07.9 (ICD-10-CM)]   Cardiac History   No past medical history on file.   Social History   Tobacco Use   Current Every Day Smoker; Smokes 1 pack/day; Smoked: Cigarettes.   Smokeless Tobacco: Never used smokeless tobacco.      Family Cardiac History as of 10/11/2017   Problem Relation Age of Onset   Heart disease Father    Interpretation Summary   GOOD STAMINA WITHOUT CHEST PAIN  LOW RISK FOR ISCHEMIA   Study Description   Stress echo with continuous EKG monitoring. Verbal consent was obtained from the patient to use Definity contrast in order to optimize the study. The use of Definity was indicated as two or more contiguous segments of the left ventricular endocardial border were unable to be adequately visualized by standard imaging methods. 1.3 mL of mechanically activated Definity was mixed with 8.7 mL of normal saline. A total of 10 mL of the resulting Definity solution was administered, and the remaining contrast was wasted and discarded.   No adverse reaction to contrast was noted.   Stress Procedure   Rest ECG  Baseline ECG of normal sinus rhythm noted.   Stress ECG  Stress ECG rhythm of sinus tachycardia noted. PVCs and non-specific ST-T wave changes noted.   Arrhythmias during stress: PVCs.   Arrhythmias were not significant.   ECG was interpretable and indicates a normal stress ECG.   Normal ECG stress ECG interpretation.   Stress Description  A stress test was performed following the Andrew protocol.   The patient achieved the target heart rate. The test was stopped because the patient complained of fatigue.  The patient reported no symptoms during the stress test. C/o midsternal burning type non radiating CP 5/10 relieved with rest. The patient experienced no angina during the stress test.   Low risk for ischemic heart disease.   Stress Echo Findings   Ventricle Size / Description  Segment augmentation  had a normal response to stress. Cavity size behaved normally in response to stress. Left ventricular function is normal. Septal wall motion is normal.   Stress Echo - Peak Stress Findings  Post stress images with adequate visualization of myocardium to assess for ischemia. Normal stress echo with no significant echocardiographic evidence for myocardial ischemia.     Patient reassessed. He reports feeling better. He has been updated on his previous cardiac evaluation.  I do believe this is more GI. He is responding to GI medications. I have advised him to followup with his PCP and gastroenterologist.         MDM  Number of Diagnoses or Management Options  Gastroesophageal reflux disease, esophagitis presence not specified: new and requires workup  Other chest pain: new and requires workup  Diagnosis management comments: Differential Diagnosis:  I considered chest wall pain, muscle strain, costochondritis, pleurisy, rib fracture, herpes zoster, cardiovascular etiology, myocardial infarction, intermediate coronary syndrome, unstable angina, angina, aortic dissection, pericarditis, pulmonary etiology, pulmonary embolism, pneumonia, pneumothorax, lung cancer, gastroesophageal reflux disease, esophagitis, esophageal spasm and gastrointestinal etiology as a possible cause of chest pain in this patient. This is a partial list of diagnoses considered.         Amount and/or Complexity of Data Reviewed  Clinical lab tests: ordered and reviewed  Tests in the radiology section of CPT®: ordered and reviewed  Tests in the medicine section of CPT®: ordered and reviewed        Final diagnoses:   Other chest pain   Gastroesophageal reflux disease, esophagitis presence not specified          ROCIO Puga  01/22/18 2011

## 2018-03-09 ENCOUNTER — HOSPITAL ENCOUNTER (EMERGENCY)
Age: 53
Discharge: HOME | End: 2018-03-09
Payer: MEDICAID

## 2018-03-09 VITALS
WEIGHT: 150 LBS | DIASTOLIC BLOOD PRESSURE: 91 MMHG | BODY MASS INDEX: 22.73 KG/M2 | SYSTOLIC BLOOD PRESSURE: 143 MMHG | TEMPERATURE: 98 F | OXYGEN SATURATION: 98 % | HEIGHT: 68 IN | RESPIRATION RATE: 18 BRPM | HEART RATE: 76 BPM

## 2018-03-09 DIAGNOSIS — M54.5 CHRONIC BILATERAL LOW BACK PAIN, WITH SCIATICA PRESENCE UNSPECIFIED: Primary | ICD-10-CM

## 2018-03-09 DIAGNOSIS — G89.29 CHRONIC BILATERAL LOW BACK PAIN, WITH SCIATICA PRESENCE UNSPECIFIED: Primary | ICD-10-CM

## 2018-03-09 PROCEDURE — 96372 THER/PROPH/DIAG INJ SC/IM: CPT

## 2018-03-09 PROCEDURE — 99283 EMERGENCY DEPT VISIT LOW MDM: CPT | Performed by: NURSE PRACTITIONER

## 2018-03-09 PROCEDURE — 99282 EMERGENCY DEPT VISIT SF MDM: CPT

## 2018-03-09 PROCEDURE — 6360000002 HC RX W HCPCS: Performed by: NURSE PRACTITIONER

## 2018-03-09 RX ORDER — KETOROLAC TROMETHAMINE 30 MG/ML
30 INJECTION, SOLUTION INTRAMUSCULAR; INTRAVENOUS ONCE
Status: COMPLETED | OUTPATIENT
Start: 2018-03-09 | End: 2018-03-09

## 2018-03-09 RX ORDER — CYCLOBENZAPRINE HCL 10 MG
10 TABLET ORAL 3 TIMES DAILY PRN
Qty: 20 TABLET | Refills: 0 | Status: SHIPPED | OUTPATIENT
Start: 2018-03-09 | End: 2018-03-16

## 2018-03-09 RX ORDER — LIDOCAINE 50 MG/G
1 PATCH TOPICAL DAILY
Status: DISCONTINUED | OUTPATIENT
Start: 2018-03-09 | End: 2018-03-09 | Stop reason: HOSPADM

## 2018-03-09 RX ADMIN — KETOROLAC TROMETHAMINE 30 MG: 30 INJECTION, SOLUTION INTRAMUSCULAR at 11:13

## 2018-03-09 ASSESSMENT — PAIN SCALES - GENERAL: PAINLEVEL_OUTOF10: 8

## 2018-03-09 ASSESSMENT — PAIN DESCRIPTION - LOCATION: LOCATION: BACK

## 2018-03-09 ASSESSMENT — PAIN DESCRIPTION - DESCRIPTORS: DESCRIPTORS: ACHING

## 2018-03-09 ASSESSMENT — ENCOUNTER SYMPTOMS
BACK PAIN: 1
RESPIRATORY NEGATIVE: 1

## 2018-03-09 ASSESSMENT — PAIN DESCRIPTION - PAIN TYPE: TYPE: ACUTE PAIN

## 2018-03-09 NOTE — ED PROVIDER NOTES
[facility]  eMERGENCY dEPARTMENT eNCOUnter      Pt Name: Willie Kern  MRN: [id number]  [place] 1965  Date of evaluation: 3/9/2018  Provider: Marquita Cadet, [address]       Chief Complaint   Patient presents with    Back Pain     X 2 weeks         HISTORY OF PRESENT ILLNESS   (Location/Symptom, Timing/Onset, Context/Setting, Quality, Duration, Modifying Factors, Severity)  Note limiting factors. Willie Kern is a 46 y.o. male who presents to the emergency department for evaluation of back pain. Pt gives history of chronic low back pain with worsening over past 2 weeks. He denies fall as well as specific injury. He tells me that he has had no abdominal pain or saddle anesthesia. He tells me that he has numbness of both legs that he assures me is no different from baseline. He has had no lower extremity weakness. He has had no urinary retention or fecal incontinence. He has no history of immunosuppression. He relates that he is anticipating following up with his pmd as he recently obtained health insurance. HPI    Nursing Notes were reviewed. REVIEW OF SYSTEMS    (2-9 systems for level 4, 10 or more for level 5)     Review of Systems   Constitutional: Negative. Respiratory: Negative. Cardiovascular: Negative. Musculoskeletal: Positive for back pain. A complete review of systems was performed and is negative except as noted above in the HPI.        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)     GERD (gastroesophageal reflux disease)          SURGICAL HISTORY       Past Surgical History:   Procedure Laterality Date    BACK SURGERY      PROSTRATE ULTRASOUND GUIDANCE      SPINAL FUSION           CURRENT MEDICATIONS       Discharge Medication List as of 3/9/2018 11:16 AM      CONTINUE these medications which have NOT CHANGED    Details   ibuprofen (ADVIL;MOTRIN) 200 MG tablet Take 200 mg by mouth every 6 hours as needed for PainHistorical Med      acetaminophen (TYLENOL) 500 MG tablet Take 500 mg by mouth every 6 hours as needed for PainHistorical Med      naproxen (NAPROSYN) 500 MG tablet Take 1 tablet by mouth 2 times daily, Disp-30 tablet, R-0Print      lidocaine (LIDODERM) 5 % Place 1 patch onto the skin daily 12 hours on, 12 hours off., Disp-10 patch, R-0Print             ALLERGIES     Patient has no known allergies. FAMILY HISTORY     History reviewed. No pertinent family history. SOCIAL HISTORY       Social History     Social History    Marital status:      Spouse name: N/A    Number of children: N/A    Years of education: N/A     Social History Main Topics    Smoking status: Current Every Day Smoker     Packs/day: 1.00     Types: Cigarettes    Smokeless tobacco: Never Used    Alcohol use No    Drug use: No    Sexual activity: Not Asked     Other Topics Concern    None     Social History Narrative    None       SCREENINGS             PHYSICAL EXAM    (up to 7 for level 4, 8 or more for level 5)     ED Triage Vitals   BP Temp Temp src Pulse Resp SpO2 Height Weight   -- -- -- -- -- -- -- --     Vitals:    03/09/18 1054   BP: (!) 143/91   Pulse: 76   Resp: 18   Temp: 98 °F (36.7 °C)   TempSrc: Temporal   SpO2: 98%   Weight: 150 lb (68 kg)   Height: 5' 8\" (1.727 m)         Physical Exam   Constitutional: He is oriented to person, place, and time. He appears well-nourished. HENT:   Head: Normocephalic. Right Ear: External ear normal.   Left Ear: External ear normal.   Mouth/Throat: Oropharynx is clear and moist.   Eyes: Conjunctivae and EOM are normal. Pupils are equal, round, and reactive to light. Neck: Normal range of motion. Cardiovascular: Normal rate, regular rhythm, normal heart sounds and intact distal pulses. Pulmonary/Chest: Effort normal and breath sounds normal.   Abdominal: Soft.  Bowel sounds are normal.

## 2018-03-09 NOTE — ED NOTES
Patient is alert and oriented x3  Respirations not labored  No apparent distress noted  Skin is WNL  Pt monitored in place      Jolie Pemberton, WESLEY  03/09/18 2294

## 2018-04-02 ENCOUNTER — TELEPHONE (OUTPATIENT)
Dept: FAMILY MEDICINE CLINIC | Facility: CLINIC | Age: 53
End: 2018-04-02

## 2018-04-02 NOTE — TELEPHONE ENCOUNTER
Marion called & said that he is trying to get into pain mgt for his back pain.  He is asking if you would send something in for him for his back pain until he can get into pain mgt. [phone]

## 2018-04-04 ENCOUNTER — TELEPHONE (OUTPATIENT)
Dept: FAMILY MEDICINE CLINIC | Facility: CLINIC | Age: 53
End: 2018-04-04

## 2018-04-04 DIAGNOSIS — M54.50 CHRONIC LOW BACK PAIN WITHOUT SCIATICA, UNSPECIFIED BACK PAIN LATERALITY: Primary | ICD-10-CM

## 2018-04-04 DIAGNOSIS — G89.29 CHRONIC LOW BACK PAIN WITHOUT SCIATICA, UNSPECIFIED BACK PAIN LATERALITY: Primary | ICD-10-CM

## 2018-05-21 ENCOUNTER — HOSPITAL ENCOUNTER (EMERGENCY)
Age: 53
Discharge: HOME | End: 2018-05-21
Payer: MEDICAID

## 2018-05-21 VITALS
BODY MASS INDEX: 24.25 KG/M2 | RESPIRATION RATE: 17 BRPM | DIASTOLIC BLOOD PRESSURE: 93 MMHG | OXYGEN SATURATION: 98 % | HEART RATE: 80 BPM | WEIGHT: 160 LBS | SYSTOLIC BLOOD PRESSURE: 132 MMHG | HEIGHT: 68 IN

## 2018-05-21 DIAGNOSIS — G89.29 ACUTE EXACERBATION OF CHRONIC LOW BACK PAIN: Primary | ICD-10-CM

## 2018-05-21 DIAGNOSIS — M54.50 ACUTE EXACERBATION OF CHRONIC LOW BACK PAIN: Primary | ICD-10-CM

## 2018-05-21 PROCEDURE — 99283 EMERGENCY DEPT VISIT LOW MDM: CPT

## 2018-05-21 PROCEDURE — 6360000002 HC RX W HCPCS: Performed by: NURSE PRACTITIONER

## 2018-05-21 PROCEDURE — 96372 THER/PROPH/DIAG INJ SC/IM: CPT

## 2018-05-21 PROCEDURE — 99283 EMERGENCY DEPT VISIT LOW MDM: CPT | Performed by: NURSE PRACTITIONER

## 2018-05-21 RX ORDER — KETOROLAC TROMETHAMINE 30 MG/ML
60 INJECTION, SOLUTION INTRAMUSCULAR; INTRAVENOUS ONCE
Status: COMPLETED | OUTPATIENT
Start: 2018-05-21 | End: 2018-05-21

## 2018-05-21 RX ORDER — BACLOFEN 10 MG/1
10 TABLET ORAL 3 TIMES DAILY
Qty: 15 TABLET | Refills: 0 | Status: SHIPPED | OUTPATIENT
Start: 2018-05-21 | End: 2018-05-26

## 2018-05-21 RX ORDER — NAPROXEN 375 MG/1
375 TABLET ORAL 2 TIMES DAILY
Qty: 60 TABLET | Refills: 0 | Status: SHIPPED | OUTPATIENT
Start: 2018-05-21 | End: 2018-10-15

## 2018-05-21 RX ADMIN — KETOROLAC TROMETHAMINE 60 MG: 30 INJECTION, SOLUTION INTRAMUSCULAR; INTRAVENOUS at 07:27

## 2018-05-21 ASSESSMENT — ENCOUNTER SYMPTOMS
SHORTNESS OF BREATH: 0
WHEEZING: 0
COUGH: 0
BACK PAIN: 1

## 2018-05-21 ASSESSMENT — PAIN SCALES - GENERAL
PAINLEVEL_OUTOF10: 8
PAINLEVEL_OUTOF10: 8

## 2018-06-29 ENCOUNTER — TELEPHONE (OUTPATIENT)
Dept: FAMILY MEDICINE CLINIC | Facility: CLINIC | Age: 53
End: 2018-06-29

## 2018-06-29 NOTE — TELEPHONE ENCOUNTER
She is wanting to know if he has had a PSA in the last year. Last one here looks like 6/2017 [phone]

## 2018-06-29 NOTE — TELEPHONE ENCOUNTER
Left a message we can't give any info on this. She is not on his contact list. Will need to get Pt permission.

## 2018-10-15 ENCOUNTER — APPOINTMENT (OUTPATIENT)
Dept: GENERAL RADIOLOGY | Age: 53
End: 2018-10-15
Payer: MEDICAID

## 2018-10-15 ENCOUNTER — HOSPITAL ENCOUNTER (EMERGENCY)
Age: 53
Discharge: HOME | End: 2018-10-15
Payer: MEDICAID

## 2018-10-15 VITALS
BODY MASS INDEX: 22.73 KG/M2 | SYSTOLIC BLOOD PRESSURE: 134 MMHG | HEIGHT: 68 IN | HEART RATE: 72 BPM | RESPIRATION RATE: 19 BRPM | WEIGHT: 150 LBS | OXYGEN SATURATION: 97 % | TEMPERATURE: 98.7 F | DIASTOLIC BLOOD PRESSURE: 78 MMHG

## 2018-10-15 DIAGNOSIS — G89.29 ACUTE EXACERBATION OF CHRONIC LOW BACK PAIN: Primary | ICD-10-CM

## 2018-10-15 DIAGNOSIS — M54.50 ACUTE EXACERBATION OF CHRONIC LOW BACK PAIN: Primary | ICD-10-CM

## 2018-10-15 PROCEDURE — 72100 X-RAY EXAM L-S SPINE 2/3 VWS: CPT

## 2018-10-15 PROCEDURE — 99283 EMERGENCY DEPT VISIT LOW MDM: CPT

## 2018-10-15 PROCEDURE — 99283 EMERGENCY DEPT VISIT LOW MDM: CPT | Performed by: NURSE PRACTITIONER

## 2018-10-15 RX ORDER — PREDNISONE 20 MG/1
20 TABLET ORAL 2 TIMES DAILY
Qty: 10 TABLET | Refills: 0 | Status: SHIPPED | OUTPATIENT
Start: 2018-10-15 | End: 2018-10-20

## 2018-10-15 RX ORDER — TIZANIDINE 4 MG/1
4 TABLET ORAL EVERY 6 HOURS PRN
Qty: 30 TABLET | Refills: 0 | Status: SHIPPED | OUTPATIENT
Start: 2018-10-15 | End: 2018-11-26

## 2018-10-15 RX ORDER — HYDROCODONE BITARTRATE AND ACETAMINOPHEN 5; 325 MG/1; MG/1
1 TABLET ORAL EVERY 6 HOURS PRN
Qty: 15 TABLET | Refills: 0 | Status: SHIPPED | OUTPATIENT
Start: 2018-10-15 | End: 2018-10-22

## 2018-10-15 ASSESSMENT — PAIN SCALES - GENERAL: PAINLEVEL_OUTOF10: 8

## 2018-10-15 ASSESSMENT — ENCOUNTER SYMPTOMS: BACK PAIN: 1

## 2018-10-15 NOTE — ED TRIAGE NOTES
Pt to ED with c/o low back pain f that has increased over last 4-5 days Pt reports Hx of chronic back issues with past surgery

## 2018-10-15 NOTE — ED PROVIDER NOTES
armando)            PAULETTE  Pt counseled to follow up with orthopedic      CONSULTS:  None    PROCEDURES:  Unless otherwise noted below, none     Procedures    FINAL IMPRESSION      1. Acute exacerbation of chronic low back pain          DISPOSITION/PLAN   DISPOSITION Decision To Discharge    PATIENT REFERRED TO:  Arnold Bai MD  [address] [place] [address]          Kelly Arzola MD  [address]  [place] [address]  [phone]            DISCHARGE MEDICATIONS:  Discharge Medication List as of 10/15/2018  4:28 PM      START taking these medications    Details   predniSONE (DELTASONE) 20 MG tablet Take 1 tablet by mouth 2 times daily for 5 days, Disp-10 tablet, R-0Print      tiZANidine (ZANAFLEX) 4 MG tablet Take 1 tablet by mouth every 6 hours as needed (back pain), Disp-30 tablet, R-0Print      HYDROcodone-acetaminophen (LORTAB) 5-325 MG per tablet Take 1 tablet by mouth every 6 hours as needed for Pain for up to 7 days. ., Disp-15 tablet, R-0Print                (Please notethat portions of this note were completed with a voice recognition program.  Efforts were made to edit the dictations but occasionally words are mis-transcribed.)    YOLANDA Watson (electronically signed)          YOLANDA Watson  10/16/18 1998

## 2018-10-15 NOTE — ED NOTES
Pt c/o chronic back pain that has worsened in the past week. Pt reports lower back pain that is severe and pain between his shoulder blades.       Michelle Kidd RN  10/15/18 6087

## 2018-11-26 ENCOUNTER — HOSPITAL ENCOUNTER (EMERGENCY)
Age: 53
Discharge: HOME | End: 2018-11-26
Attending: EMERGENCY MEDICINE
Payer: MEDICAID

## 2018-11-26 VITALS
DIASTOLIC BLOOD PRESSURE: 86 MMHG | RESPIRATION RATE: 20 BRPM | SYSTOLIC BLOOD PRESSURE: 145 MMHG | OXYGEN SATURATION: 97 % | TEMPERATURE: 98 F | HEART RATE: 83 BPM

## 2018-11-26 DIAGNOSIS — M54.50 ACUTE EXACERBATION OF CHRONIC LOW BACK PAIN: Primary | ICD-10-CM

## 2018-11-26 DIAGNOSIS — G89.29 ACUTE EXACERBATION OF CHRONIC LOW BACK PAIN: Primary | ICD-10-CM

## 2018-11-26 PROCEDURE — 6360000002 HC RX W HCPCS: Performed by: EMERGENCY MEDICINE

## 2018-11-26 PROCEDURE — 96374 THER/PROPH/DIAG INJ IV PUSH: CPT

## 2018-11-26 PROCEDURE — 96372 THER/PROPH/DIAG INJ SC/IM: CPT

## 2018-11-26 PROCEDURE — 99282 EMERGENCY DEPT VISIT SF MDM: CPT

## 2018-11-26 PROCEDURE — 99283 EMERGENCY DEPT VISIT LOW MDM: CPT | Performed by: EMERGENCY MEDICINE

## 2018-11-26 RX ORDER — ORPHENADRINE CITRATE 30 MG/ML
60 INJECTION INTRAMUSCULAR; INTRAVENOUS ONCE
Status: COMPLETED | OUTPATIENT
Start: 2018-11-26 | End: 2018-11-26

## 2018-11-26 RX ORDER — IBUPROFEN 800 MG/1
800 TABLET ORAL EVERY 6 HOURS PRN
Qty: 20 TABLET | Refills: 0 | Status: SHIPPED | OUTPATIENT
Start: 2018-11-26

## 2018-11-26 RX ORDER — KETOROLAC TROMETHAMINE 30 MG/ML
30 INJECTION, SOLUTION INTRAMUSCULAR; INTRAVENOUS ONCE
Status: COMPLETED | OUTPATIENT
Start: 2018-11-26 | End: 2018-11-26

## 2018-11-26 RX ORDER — TIZANIDINE 4 MG/1
4 TABLET ORAL EVERY 6 HOURS PRN
Qty: 30 TABLET | Refills: 0 | Status: SHIPPED | OUTPATIENT
Start: 2018-11-26 | End: 2019-04-15

## 2018-11-26 RX ADMIN — ORPHENADRINE CITRATE 60 MG: 30 INJECTION INTRAMUSCULAR; INTRAVENOUS at 07:13

## 2018-11-26 RX ADMIN — KETOROLAC TROMETHAMINE 30 MG: 30 INJECTION, SOLUTION INTRAMUSCULAR at 07:13

## 2018-11-26 ASSESSMENT — ENCOUNTER SYMPTOMS
SHORTNESS OF BREATH: 0
RHINORRHEA: 0
VOMITING: 0
SORE THROAT: 0
DIARRHEA: 0
ABDOMINAL PAIN: 0
NAUSEA: 0
BACK PAIN: 1

## 2018-11-26 ASSESSMENT — PAIN DESCRIPTION - LOCATION: LOCATION: BACK

## 2018-11-26 ASSESSMENT — PAIN SCALES - GENERAL: PAINLEVEL_OUTOF10: 9

## 2018-11-26 ASSESSMENT — PAIN DESCRIPTION - PAIN TYPE: TYPE: CHRONIC PAIN

## 2018-12-20 DIAGNOSIS — Z12.5 SCREENING PSA (PROSTATE SPECIFIC ANTIGEN): ICD-10-CM

## 2018-12-20 DIAGNOSIS — Z13.6 SCREENING FOR ISCHEMIC HEART DISEASE: ICD-10-CM

## 2018-12-20 DIAGNOSIS — K21.9 GASTROESOPHAGEAL REFLUX DISEASE, ESOPHAGITIS PRESENCE NOT SPECIFIED: Primary | ICD-10-CM

## 2018-12-21 ENCOUNTER — RESULTS ENCOUNTER (OUTPATIENT)
Dept: FAMILY MEDICINE CLINIC | Facility: CLINIC | Age: 53
End: 2018-12-21

## 2018-12-21 DIAGNOSIS — Z12.5 SCREENING PSA (PROSTATE SPECIFIC ANTIGEN): ICD-10-CM

## 2018-12-21 DIAGNOSIS — K21.9 GASTROESOPHAGEAL REFLUX DISEASE, ESOPHAGITIS PRESENCE NOT SPECIFIED: ICD-10-CM

## 2018-12-21 DIAGNOSIS — Z13.6 SCREENING FOR ISCHEMIC HEART DISEASE: ICD-10-CM

## 2019-04-15 ENCOUNTER — HOSPITAL ENCOUNTER (EMERGENCY)
Age: 54
Discharge: HOME | End: 2019-04-15

## 2019-04-15 VITALS
HEART RATE: 87 BPM | TEMPERATURE: 98.2 F | RESPIRATION RATE: 18 BRPM | OXYGEN SATURATION: 97 % | DIASTOLIC BLOOD PRESSURE: 82 MMHG | SYSTOLIC BLOOD PRESSURE: 150 MMHG

## 2019-04-15 DIAGNOSIS — M54.40 CHRONIC BILATERAL LOW BACK PAIN WITH SCIATICA, SCIATICA LATERALITY UNSPECIFIED: ICD-10-CM

## 2019-04-15 DIAGNOSIS — G89.29 CHRONIC BILATERAL LOW BACK PAIN WITH SCIATICA, SCIATICA LATERALITY UNSPECIFIED: ICD-10-CM

## 2019-04-15 DIAGNOSIS — J02.0 STREP PHARYNGITIS: Primary | ICD-10-CM

## 2019-04-15 LAB — S PYO AG THROAT QL: POSITIVE

## 2019-04-15 PROCEDURE — 99283 EMERGENCY DEPT VISIT LOW MDM: CPT | Performed by: NURSE PRACTITIONER

## 2019-04-15 PROCEDURE — 87880 STREP A ASSAY W/OPTIC: CPT

## 2019-04-15 PROCEDURE — 6370000000 HC RX 637 (ALT 250 FOR IP): Performed by: NURSE PRACTITIONER

## 2019-04-15 PROCEDURE — 99283 EMERGENCY DEPT VISIT LOW MDM: CPT

## 2019-04-15 RX ORDER — AMOXICILLIN 500 MG/1
500 CAPSULE ORAL 3 TIMES DAILY
Qty: 30 CAPSULE | Refills: 0 | Status: SHIPPED | OUTPATIENT
Start: 2019-04-15 | End: 2019-04-25

## 2019-04-15 RX ORDER — HYDROCODONE BITARTRATE AND ACETAMINOPHEN 7.5; 325 MG/1; MG/1
1 TABLET ORAL ONCE
Status: COMPLETED | OUTPATIENT
Start: 2019-04-15 | End: 2019-04-15

## 2019-04-15 RX ADMIN — HYDROCODONE BITARTRATE AND ACETAMINOPHEN 1 TABLET: 7.5; 325 TABLET ORAL at 12:07

## 2019-04-15 ASSESSMENT — PAIN DESCRIPTION - LOCATION: LOCATION: THROAT

## 2019-04-15 ASSESSMENT — PAIN SCALES - GENERAL
PAINLEVEL_OUTOF10: 9
PAINLEVEL_OUTOF10: 9

## 2019-04-15 ASSESSMENT — PAIN DESCRIPTION - PAIN TYPE: TYPE: ACUTE PAIN

## 2019-04-15 NOTE — ED NOTES
Pt informed we need urine sample, per pt he cannot pee at this time     Genene Riedel, RN  04/15/19 9297

## 2019-04-15 NOTE — ED PROVIDER NOTES
Respiratory: Negative for stridor. Gastrointestinal: Negative for abdominal pain and bowel incontinence. Genitourinary: Negative for bladder incontinence. Musculoskeletal: Positive for back pain. Negative for neck stiffness. Skin: Negative for rash. Neurological: Positive for headaches. Except as noted above the remainder of the review ofsystems was reviewed and negative. 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 FAMILY HISTORY     History reviewed. No pertinent family history.        SOCIAL HISTORY       Social History     Socioeconomic History    Marital status:      Spouse name: None    Number of children: None    Years of education: None    Highest education level: None   Occupational History    None   Social Needs    Financial resource strain: None    Food insecurity:     Worry: None     Inability: None    Transportation needs:     Medical: None     Non-medical: None   Tobacco Use    Smoking status: Current Every Day Smoker     Packs/day: 1.00     Types: Cigarettes    Smokeless tobacco: Never Used   Substance and Sexual Activity    Alcohol use: No    Drug use: No    Sexual activity: None   Lifestyle    Physical activity:     Days per week: None     Minutes per session: None    Stress: None   Relationships    Social connections:     Talks on phone: None     Gets together: None     Attends [employer] service: None     Active member of club or organization: None     Attends meetings of clubs or organizations: None     Relationship status: None    Intimate partner violence:     Fear of current or ex partner: None     Emotionally abused: None     Physically abused: None     Forced sexual activity: None   Other Topics Concern    None   Social History Narrative    None       SCREENINGS    @FLOW(08722)@        PHYSICAL EXAM  (up to 7 for level 4, 8 or more for level 5)     ED Triage Vitals [04/15/19 0958]   BP Temp Temp Source Pulse Resp SpO2 Height Weight   (!) 150/82 98.2 °F (36.8 °C) Oral 87 18 97 % -- --       Physical Exam   Constitutional: He appears well-developed and well-nourished. HENT:   Head: Normocephalic and atraumatic. Mouth/Throat: Uvula is midline and mucous membranes are normal. Oropharyngeal exudate and posterior oropharyngeal erythema present. Eyes: Right eye exhibits no discharge. Left eye exhibits no discharge. No scleral icterus. Neck: Normal range of motion. Neck supple. Cardiovascular: Normal rate. Pulmonary/Chest: No respiratory distress. Neurological: He is alert. Psychiatric: He has a normal mood and affect. His behavior is normal.   Nursing note and vitals reviewed. DIAGNOSTIC RESULTS     No orders to display        Labs Reviewed   RAPID STREP SCREEN - Abnormal; Notable for the following components:       Result Value    Rapid Strep A Screen POSITIVE (*)     All other components within normal limits         EMERGENCY DEPARTMENT COURSE and DIFFERENTIAL DIAGNOSIS/MDM:   Vitals:    Vitals:    04/15/19 0958   BP: (!) 150/82   Pulse: 87   Resp: 18   Temp: 98.2 °F (36.8 °C)   TempSrc: Oral   SpO2: 97%           MDM      CONSULTS:  None    PROCEDURES:  Unless otherwise noted below, none     Procedures    FINAL IMPRESSION      1. Strep pharyngitis    2.  Chronic bilateral low back pain with sciatica, sciatica laterality unspecified          DISPOSITION/PLAN   DISPOSITION Decision To Discharge    PATIENT REFERRED TO:  Nkechi Orozco MD  [facility]  [phone]            DISCHARGE MEDICATIONS:  Discharge Medication List as of 4/15/2019 12:05 PM      START taking these medications    Details   amoxicillin (AMOXIL) 500 MG capsule Take 1 capsule by mouth 3 times daily for 10 days, Disp-30 capsule, R-0Print                (Please notethat portions of this note were completed with a voice recognition program.  Efforts were made to edit the dictations but occasionally words are mis-transcribed.)    YOLANDA Whitehead (electronically signed)          YOLANDA Whitehead  04/16/19 0112

## 2019-04-15 NOTE — ED NOTES
Assessment:  Airway intact. Pt respirations even and unlabored, skin color within normal limits. Skin is warm and dry. Capillary refill less than 2 seconds. Pt throat erythematous and swollen. Cervical lymph nodes tender to left side. Bowel sounds within normal limits. Abdomen soft and nontender. Alert and oriented x 4. Pt is in no acute distress. Call light within reach, pt gowned. Pt on monitor and alarms on.        Lina Jacobs RN  04/15/19 1013

## 2019-04-16 ASSESSMENT — ENCOUNTER SYMPTOMS
BOWEL INCONTINENCE: 0
BACK PAIN: 1
TROUBLE SWALLOWING: 0
STRIDOR: 0
ABDOMINAL PAIN: 0
VOICE CHANGE: 0
RHINORRHEA: 0

## 2019-07-09 ENCOUNTER — TRANSCRIBE ORDERS (OUTPATIENT)
Dept: ADMINISTRATIVE | Facility: HOSPITAL | Age: 54
End: 2019-07-09

## 2019-07-09 ENCOUNTER — HOSPITAL ENCOUNTER (OUTPATIENT)
Dept: GENERAL RADIOLOGY | Facility: HOSPITAL | Age: 54
Discharge: HOME | End: 2019-07-09

## 2019-07-09 DIAGNOSIS — M25.572 LEFT ANKLE PAIN, UNSPECIFIED CHRONICITY: Primary | ICD-10-CM

## 2019-07-09 DIAGNOSIS — M25.572 LEFT ANKLE PAIN, UNSPECIFIED CHRONICITY: ICD-10-CM

## 2019-07-09 PROCEDURE — 73610 X-RAY EXAM OF ANKLE: CPT

## 2019-07-12 ENCOUNTER — HOSPITAL ENCOUNTER (OUTPATIENT)
Dept: PREADMISSION TESTING | Age: 54
Discharge: HOME | End: 2019-07-16
Payer: COMMERCIAL

## 2019-07-12 VITALS — HEIGHT: 68 IN | BODY MASS INDEX: 24.25 KG/M2 | WEIGHT: 160 LBS

## 2019-07-12 LAB
BASOPHILS ABSOLUTE: 0.1 K/UL (ref 0–0.2)
BASOPHILS RELATIVE PERCENT: 0.7 % (ref 0–1)
EOSINOPHILS ABSOLUTE: 0.2 K/UL (ref 0–0.6)
EOSINOPHILS RELATIVE PERCENT: 1.8 % (ref 0–5)
HCT VFR BLD CALC: 44.6 % (ref 42–52)
HEMOGLOBIN: 14.9 G/DL (ref 14–18)
LYMPHOCYTES ABSOLUTE: 3.5 K/UL (ref 1.1–4.5)
LYMPHOCYTES RELATIVE PERCENT: 39.1 % (ref 20–40)
MCH RBC QN AUTO: 30.3 PG (ref 27–31)
MCHC RBC AUTO-ENTMCNC: 33.4 G/DL (ref 33–37)
MCV RBC AUTO: 90.8 FL (ref 80–94)
MONOCYTES ABSOLUTE: 0.5 K/UL (ref 0–0.9)
MONOCYTES RELATIVE PERCENT: 5.8 % (ref 0–10)
NEUTROPHILS ABSOLUTE: 4.7 K/UL (ref 1.5–7.5)
NEUTROPHILS RELATIVE PERCENT: 52.3 % (ref 50–65)
PDW BLD-RTO: 13.4 % (ref 11.5–14.5)
PLATELET # BLD: 180 K/UL (ref 130–400)
PMV BLD AUTO: 11.3 FL (ref 9.4–12.4)
RBC # BLD: 4.91 M/UL (ref 4.7–6.1)
WBC # BLD: 9 K/UL (ref 4.8–10.8)

## 2019-07-12 PROCEDURE — 93005 ELECTROCARDIOGRAM TRACING: CPT

## 2019-07-12 PROCEDURE — 85025 COMPLETE CBC W/AUTO DIFF WBC: CPT

## 2019-07-12 RX ORDER — OXYCODONE AND ACETAMINOPHEN 10; 325 MG/1; MG/1
1 TABLET ORAL EVERY 6 HOURS PRN
COMMUNITY
End: 2021-03-29

## 2019-07-14 LAB
EKG P AXIS: 61 DEGREES
EKG P-R INTERVAL: 158 MS
EKG Q-T INTERVAL: 418 MS
EKG QRS DURATION: 108 MS
EKG QTC CALCULATION (BAZETT): 418 MS
EKG T AXIS: 47 DEGREES

## 2019-07-15 ENCOUNTER — APPOINTMENT (OUTPATIENT)
Dept: GENERAL RADIOLOGY | Age: 54
End: 2019-07-15
Attending: ORTHOPAEDIC SURGERY
Payer: COMMERCIAL

## 2019-07-15 ENCOUNTER — ANESTHESIA (OUTPATIENT)
Dept: OPERATING ROOM | Age: 54
End: 2019-07-15
Payer: COMMERCIAL

## 2019-07-15 ENCOUNTER — ANESTHESIA EVENT (OUTPATIENT)
Dept: OPERATING ROOM | Age: 54
End: 2019-07-15
Payer: COMMERCIAL

## 2019-07-15 ENCOUNTER — HOSPITAL ENCOUNTER (OUTPATIENT)
Age: 54
Setting detail: OUTPATIENT SURGERY
Discharge: HOME | End: 2019-07-15
Attending: ORTHOPAEDIC SURGERY | Admitting: ORTHOPAEDIC SURGERY
Payer: COMMERCIAL

## 2019-07-15 VITALS
BODY MASS INDEX: 24.25 KG/M2 | OXYGEN SATURATION: 95 % | SYSTOLIC BLOOD PRESSURE: 128 MMHG | TEMPERATURE: 97.6 F | DIASTOLIC BLOOD PRESSURE: 72 MMHG | HEIGHT: 68 IN | RESPIRATION RATE: 18 BRPM | HEART RATE: 69 BPM | WEIGHT: 160 LBS

## 2019-07-15 VITALS
SYSTOLIC BLOOD PRESSURE: 96 MMHG | RESPIRATION RATE: 8 BRPM | OXYGEN SATURATION: 97 % | DIASTOLIC BLOOD PRESSURE: 54 MMHG

## 2019-07-15 DIAGNOSIS — S82.892A CLOSED FRACTURE OF LEFT ANKLE, INITIAL ENCOUNTER: Primary | ICD-10-CM

## 2019-07-15 PROCEDURE — 6360000002 HC RX W HCPCS: Performed by: ANESTHESIOLOGY

## 2019-07-15 PROCEDURE — 2500000003 HC RX 250 WO HCPCS: Performed by: NURSE ANESTHETIST, CERTIFIED REGISTERED

## 2019-07-15 PROCEDURE — 73610 X-RAY EXAM OF ANKLE: CPT

## 2019-07-15 PROCEDURE — 6360000002 HC RX W HCPCS: Performed by: ORTHOPAEDIC SURGERY

## 2019-07-15 PROCEDURE — 64445 NJX AA&/STRD SCIATIC NRV IMG: CPT | Performed by: NURSE ANESTHETIST, CERTIFIED REGISTERED

## 2019-07-15 PROCEDURE — 2580000003 HC RX 258: Performed by: ORTHOPAEDIC SURGERY

## 2019-07-15 PROCEDURE — 3600000004 HC SURGERY LEVEL 4 BASE: Performed by: ORTHOPAEDIC SURGERY

## 2019-07-15 PROCEDURE — 7100000000 HC PACU RECOVERY - FIRST 15 MIN: Performed by: ORTHOPAEDIC SURGERY

## 2019-07-15 PROCEDURE — 7100000010 HC PHASE II RECOVERY - FIRST 15 MIN: Performed by: ORTHOPAEDIC SURGERY

## 2019-07-15 PROCEDURE — 2580000003 HC RX 258: Performed by: ANESTHESIOLOGY

## 2019-07-15 PROCEDURE — 3600000014 HC SURGERY LEVEL 4 ADDTL 15MIN: Performed by: ORTHOPAEDIC SURGERY

## 2019-07-15 PROCEDURE — 7100000001 HC PACU RECOVERY - ADDTL 15 MIN: Performed by: ORTHOPAEDIC SURGERY

## 2019-07-15 PROCEDURE — 3700000000 HC ANESTHESIA ATTENDED CARE: Performed by: ORTHOPAEDIC SURGERY

## 2019-07-15 PROCEDURE — 6360000002 HC RX W HCPCS: Performed by: NURSE ANESTHETIST, CERTIFIED REGISTERED

## 2019-07-15 PROCEDURE — 2709999900 HC NON-CHARGEABLE SUPPLY: Performed by: ORTHOPAEDIC SURGERY

## 2019-07-15 PROCEDURE — 2720000010 HC SURG SUPPLY STERILE: Performed by: ORTHOPAEDIC SURGERY

## 2019-07-15 PROCEDURE — 3209999900 FLUORO FOR SURGICAL PROCEDURES

## 2019-07-15 PROCEDURE — C1713 ANCHOR/SCREW BN/BN,TIS/BN: HCPCS | Performed by: ORTHOPAEDIC SURGERY

## 2019-07-15 PROCEDURE — 3700000001 HC ADD 15 MINUTES (ANESTHESIA): Performed by: ORTHOPAEDIC SURGERY

## 2019-07-15 DEVICE — SCREW BNE L14MM DIA3.5MM CORT S STL ST NONCANNULATED LOK: Type: IMPLANTABLE DEVICE | Site: ANKLE | Status: FUNCTIONAL

## 2019-07-15 DEVICE — SCREW BNE L22MM DIA2.4MM CORT S STL ST T8 STARDRV RECESS: Type: IMPLANTABLE DEVICE | Site: ANKLE | Status: FUNCTIONAL

## 2019-07-15 DEVICE — SCREW BNE L14MM DIA3.5MM CORT S STL ST LOK FULL THRD: Type: IMPLANTABLE DEVICE | Site: ANKLE | Status: FUNCTIONAL

## 2019-07-15 DEVICE — SCREW BNE L16MM DIA4MM CANC S STL ST CANN NONLOCKING FULL: Type: IMPLANTABLE DEVICE | Site: ANKLE | Status: FUNCTIONAL

## 2019-07-15 DEVICE — SCREW BNE L16MM DIA3.5MM CORT S STL ST NONCANNULATED LOK: Type: IMPLANTABLE DEVICE | Site: ANKLE | Status: FUNCTIONAL

## 2019-07-15 DEVICE — PLATE BNE L117MM 10 H S STL 1/3 TBLR LOK COMPR W/ CLLR FOR: Type: IMPLANTABLE DEVICE | Site: ANKLE | Status: FUNCTIONAL

## 2019-07-15 RX ORDER — ONDANSETRON 4 MG/1
4 TABLET, FILM COATED ORAL DAILY PRN
Qty: 20 TABLET | Refills: 0 | Status: SHIPPED | OUTPATIENT
Start: 2019-07-15 | End: 2021-03-29

## 2019-07-15 RX ORDER — SODIUM CHLORIDE 0.9 % (FLUSH) 0.9 %
10 SYRINGE (ML) INJECTION EVERY 12 HOURS SCHEDULED
Status: DISCONTINUED | OUTPATIENT
Start: 2019-07-15 | End: 2019-07-15 | Stop reason: HOSPADM

## 2019-07-15 RX ORDER — LIDOCAINE HYDROCHLORIDE 10 MG/ML
INJECTION, SOLUTION EPIDURAL; INFILTRATION; INTRACAUDAL; PERINEURAL PRN
Status: DISCONTINUED | OUTPATIENT
Start: 2019-07-15 | End: 2019-07-15 | Stop reason: SDUPTHER

## 2019-07-15 RX ORDER — MIDAZOLAM HYDROCHLORIDE 1 MG/ML
2 INJECTION INTRAMUSCULAR; INTRAVENOUS
Status: COMPLETED | OUTPATIENT
Start: 2019-07-15 | End: 2019-07-15

## 2019-07-15 RX ORDER — DEXAMETHASONE SODIUM PHOSPHATE 10 MG/ML
INJECTION INTRAMUSCULAR; INTRAVENOUS PRN
Status: DISCONTINUED | OUTPATIENT
Start: 2019-07-15 | End: 2019-07-15 | Stop reason: SDUPTHER

## 2019-07-15 RX ORDER — ROPIVACAINE HYDROCHLORIDE 5 MG/ML
INJECTION, SOLUTION EPIDURAL; INFILTRATION; PERINEURAL
Status: COMPLETED | OUTPATIENT
Start: 2019-07-15 | End: 2019-07-15

## 2019-07-15 RX ORDER — SODIUM CHLORIDE 0.9 % (FLUSH) 0.9 %
10 SYRINGE (ML) INJECTION PRN
Status: DISCONTINUED | OUTPATIENT
Start: 2019-07-15 | End: 2019-07-15 | Stop reason: HOSPADM

## 2019-07-15 RX ORDER — SCOLOPAMINE TRANSDERMAL SYSTEM 1 MG/1
1 PATCH, EXTENDED RELEASE TRANSDERMAL ONCE
Status: DISCONTINUED | OUTPATIENT
Start: 2019-07-15 | End: 2019-07-15 | Stop reason: HOSPADM

## 2019-07-15 RX ORDER — ROPIVACAINE HYDROCHLORIDE 5 MG/ML
INJECTION, SOLUTION EPIDURAL; INFILTRATION; PERINEURAL PRN
Status: DISCONTINUED | OUTPATIENT
Start: 2019-07-15 | End: 2019-07-15 | Stop reason: SDUPTHER

## 2019-07-15 RX ORDER — MORPHINE SULFATE 2 MG/ML
2 INJECTION, SOLUTION INTRAMUSCULAR; INTRAVENOUS EVERY 5 MIN PRN
Status: DISCONTINUED | OUTPATIENT
Start: 2019-07-15 | End: 2019-07-15 | Stop reason: HOSPADM

## 2019-07-15 RX ORDER — ENALAPRILAT 2.5 MG/2ML
1.25 INJECTION INTRAVENOUS
Status: DISCONTINUED | OUTPATIENT
Start: 2019-07-15 | End: 2019-07-15 | Stop reason: HOSPADM

## 2019-07-15 RX ORDER — LIDOCAINE HYDROCHLORIDE 10 MG/ML
1 INJECTION, SOLUTION EPIDURAL; INFILTRATION; INTRACAUDAL; PERINEURAL
Status: DISCONTINUED | OUTPATIENT
Start: 2019-07-15 | End: 2019-07-15 | Stop reason: HOSPADM

## 2019-07-15 RX ORDER — FENTANYL CITRATE 50 UG/ML
50 INJECTION, SOLUTION INTRAMUSCULAR; INTRAVENOUS
Status: DISCONTINUED | OUTPATIENT
Start: 2019-07-15 | End: 2019-07-15 | Stop reason: HOSPADM

## 2019-07-15 RX ORDER — HYDRALAZINE HYDROCHLORIDE 20 MG/ML
5 INJECTION INTRAMUSCULAR; INTRAVENOUS EVERY 10 MIN PRN
Status: DISCONTINUED | OUTPATIENT
Start: 2019-07-15 | End: 2019-07-15 | Stop reason: HOSPADM

## 2019-07-15 RX ORDER — PROPOFOL 10 MG/ML
INJECTION, EMULSION INTRAVENOUS PRN
Status: DISCONTINUED | OUTPATIENT
Start: 2019-07-15 | End: 2019-07-15 | Stop reason: SDUPTHER

## 2019-07-15 RX ORDER — PROMETHAZINE HYDROCHLORIDE 25 MG/ML
6.25 INJECTION, SOLUTION INTRAMUSCULAR; INTRAVENOUS
Status: COMPLETED | OUTPATIENT
Start: 2019-07-15 | End: 2019-07-15

## 2019-07-15 RX ORDER — MORPHINE SULFATE 2 MG/ML
4 INJECTION, SOLUTION INTRAMUSCULAR; INTRAVENOUS EVERY 5 MIN PRN
Status: DISCONTINUED | OUTPATIENT
Start: 2019-07-15 | End: 2019-07-15 | Stop reason: HOSPADM

## 2019-07-15 RX ORDER — LABETALOL 20 MG/4 ML (5 MG/ML) INTRAVENOUS SYRINGE
5 EVERY 10 MIN PRN
Status: DISCONTINUED | OUTPATIENT
Start: 2019-07-15 | End: 2019-07-15 | Stop reason: HOSPADM

## 2019-07-15 RX ORDER — EPHEDRINE SULFATE 50 MG/ML
INJECTION, SOLUTION INTRAVENOUS PRN
Status: DISCONTINUED | OUTPATIENT
Start: 2019-07-15 | End: 2019-07-15 | Stop reason: SDUPTHER

## 2019-07-15 RX ORDER — DIPHENHYDRAMINE HYDROCHLORIDE 50 MG/ML
12.5 INJECTION INTRAMUSCULAR; INTRAVENOUS
Status: DISCONTINUED | OUTPATIENT
Start: 2019-07-15 | End: 2019-07-15 | Stop reason: HOSPADM

## 2019-07-15 RX ORDER — ONDANSETRON 2 MG/ML
INJECTION INTRAMUSCULAR; INTRAVENOUS PRN
Status: DISCONTINUED | OUTPATIENT
Start: 2019-07-15 | End: 2019-07-15 | Stop reason: SDUPTHER

## 2019-07-15 RX ORDER — METOCLOPRAMIDE HYDROCHLORIDE 5 MG/ML
10 INJECTION INTRAMUSCULAR; INTRAVENOUS
Status: DISCONTINUED | OUTPATIENT
Start: 2019-07-15 | End: 2019-07-15 | Stop reason: HOSPADM

## 2019-07-15 RX ORDER — SODIUM CHLORIDE, SODIUM LACTATE, POTASSIUM CHLORIDE, CALCIUM CHLORIDE 600; 310; 30; 20 MG/100ML; MG/100ML; MG/100ML; MG/100ML
INJECTION, SOLUTION INTRAVENOUS CONTINUOUS
Status: DISCONTINUED | OUTPATIENT
Start: 2019-07-15 | End: 2019-07-15 | Stop reason: HOSPADM

## 2019-07-15 RX ORDER — MORPHINE SULFATE 4 MG/ML
4 INJECTION, SOLUTION INTRAMUSCULAR; INTRAVENOUS
Status: DISCONTINUED | OUTPATIENT
Start: 2019-07-15 | End: 2019-07-15 | Stop reason: HOSPADM

## 2019-07-15 RX ORDER — MEPERIDINE HYDROCHLORIDE 50 MG/ML
12.5 INJECTION INTRAMUSCULAR; INTRAVENOUS; SUBCUTANEOUS EVERY 5 MIN PRN
Status: DISCONTINUED | OUTPATIENT
Start: 2019-07-15 | End: 2019-07-15 | Stop reason: HOSPADM

## 2019-07-15 RX ORDER — HYDROCODONE BITARTRATE AND ACETAMINOPHEN 10; 325 MG/1; MG/1
TABLET ORAL
Qty: 40 TABLET | Refills: 0 | Status: SHIPPED | OUTPATIENT
Start: 2019-07-15 | End: 2019-07-22

## 2019-07-15 RX ADMIN — EPHEDRINE SULFATE 10 MG: 50 INJECTION, SOLUTION INTRAMUSCULAR; INTRAVENOUS; SUBCUTANEOUS at 13:45

## 2019-07-15 RX ADMIN — ROPIVACAINE HYDROCHLORIDE 25 ML: 5 INJECTION, SOLUTION EPIDURAL; INFILTRATION; PERINEURAL at 13:01

## 2019-07-15 RX ADMIN — SODIUM CHLORIDE, SODIUM LACTATE, POTASSIUM CHLORIDE, AND CALCIUM CHLORIDE: 600; 310; 30; 20 INJECTION, SOLUTION INTRAVENOUS at 13:17

## 2019-07-15 RX ADMIN — LIDOCAINE HYDROCHLORIDE 50 MG: 10 INJECTION, SOLUTION EPIDURAL; INFILTRATION; INTRACAUDAL; PERINEURAL at 13:20

## 2019-07-15 RX ADMIN — PROPOFOL 200 MG: 10 INJECTION, EMULSION INTRAVENOUS at 13:20

## 2019-07-15 RX ADMIN — SODIUM CHLORIDE, SODIUM LACTATE, POTASSIUM CHLORIDE, AND CALCIUM CHLORIDE: 600; 310; 30; 20 INJECTION, SOLUTION INTRAVENOUS at 13:43

## 2019-07-15 RX ADMIN — DEXAMETHASONE SODIUM PHOSPHATE 10 MG: 10 INJECTION INTRAMUSCULAR; INTRAVENOUS at 13:25

## 2019-07-15 RX ADMIN — WATER 1 G: 1 INJECTION INTRAMUSCULAR; INTRAVENOUS; SUBCUTANEOUS at 13:28

## 2019-07-15 RX ADMIN — PROMETHAZINE HYDROCHLORIDE 6.25 MG: 25 INJECTION INTRAMUSCULAR; INTRAVENOUS at 15:34

## 2019-07-15 RX ADMIN — EPHEDRINE SULFATE 10 MG: 50 INJECTION, SOLUTION INTRAMUSCULAR; INTRAVENOUS; SUBCUTANEOUS at 14:15

## 2019-07-15 RX ADMIN — EPHEDRINE SULFATE 10 MG: 50 INJECTION, SOLUTION INTRAMUSCULAR; INTRAVENOUS; SUBCUTANEOUS at 13:43

## 2019-07-15 RX ADMIN — MIDAZOLAM 2 MG: 1 INJECTION INTRAMUSCULAR; INTRAVENOUS at 12:57

## 2019-07-15 RX ADMIN — ONDANSETRON HYDROCHLORIDE 4 MG: 2 INJECTION, SOLUTION INTRAMUSCULAR; INTRAVENOUS at 14:31

## 2019-07-15 RX ADMIN — EPHEDRINE SULFATE 10 MG: 50 INJECTION, SOLUTION INTRAMUSCULAR; INTRAVENOUS; SUBCUTANEOUS at 14:00

## 2019-07-15 ASSESSMENT — PAIN DESCRIPTION - PAIN TYPE: TYPE: SURGICAL PAIN

## 2019-07-15 ASSESSMENT — PAIN SCALES - GENERAL: PAINLEVEL_OUTOF10: 1

## 2019-07-15 ASSESSMENT — PAIN DESCRIPTION - ORIENTATION: ORIENTATION: LEFT

## 2019-07-15 ASSESSMENT — PAIN DESCRIPTION - LOCATION: LOCATION: ANKLE

## 2019-07-15 ASSESSMENT — LIFESTYLE VARIABLES: SMOKING_STATUS: 1

## 2019-07-15 NOTE — H&P
Pt Name: Hipolito Shook  MRN: [id number]  YOB: 1965  Date: 7/15/2019      HPI: 48year old male presented to the outpatient clinic with complaints of severe left ankle pain after suffering an eversion and twisting injury to the ankle. X-rays showed a distal fibula fracture with questionable syndesmotic widening. Due to the displaced nature of the fracture, the decision was made to bring the patient to the OR for ORIF of the left ankle. 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 Authorizing Provider   oxyCODONE-acetaminophen (PERCOCET)  MG per tablet Take 1 tablet by mouth every 6 hours as needed for Pain. Historical Provider, MD   ibuprofen (IBU) 800 MG tablet Take 1 tablet by mouth every 6 hours as needed for Pain 11/26/18   Luis Enrique Boudreaux MD       Allergies: No Known Allergies    Review of systems:     * Constitutional: negative     * HEENT: negative     * Skin: negative     * Cardiac: negative     * Respiratory: negative     * GI: negative     * : negative     * Neuro: negative     * CNS: negative     * Extremities: negative     * Endcrine: negative     Physical Exam:   There were no vitals taken for this visit.     - General: normal development and appearance     - HEENT: normocephalic, MILY     - Skin: pink, warm, normal tone     - Neck: supple, no masses,     - Chest: no rales or wheezes, normal expansion     - Heart: RRR, no murmurs/gallops     - Abdomen: soft, nondistended,

## 2019-07-15 NOTE — ANESTHESIA PRE PROCEDURE
Tympanic   SpO2: 97%   Weight: 160 lb (72.6 kg)   Height: 5' 8\" (1.727 m)                                              BP Readings from Last 3 Encounters:   07/15/19 130/82   04/15/19 (!) 150/82   11/26/18 (!) 145/86       NPO Status: Time of last liquid consumption: 1900                        Time of last solid consumption: 1900                        Date of last liquid consumption: 07/14/19                        Date of last solid food consumption: 07/14/19    BMI:   Wt Readings from Last 3 Encounters:   07/15/19 160 lb (72.6 kg)   07/12/19 160 lb (72.6 kg)   10/15/18 150 lb (68 kg)     Body mass index is 24.33 kg/m². CBC:   Lab Results   Component Value Date    WBC 9.0 07/12/2019    RBC 4.91 07/12/2019    HGB 14.9 07/12/2019    HCT 44.6 07/12/2019    MCV 90.8 07/12/2019    RDW 13.4 07/12/2019     07/12/2019       CMP: No results found for: NA, K, CL, CO2, BUN, CREATININE, GFRAA, AGRATIO, LABGLOM, GLUCOSE, PROT, CALCIUM, BILITOT, ALKPHOS, AST, ALT    POC Tests: No results for input(s): POCGLU, POCNA, POCK, POCCL, POCBUN, POCHEMO, POCHCT in the last 72 hours. Coags: No results found for: PROTIME, INR, APTT    HCG (If Applicable): No results found for: PREGTESTUR, PREGSERUM, HCG, HCGQUANT     ABGs: No results found for: PHART, PO2ART, OGZ5UID, MSM4VTZ, BEART, S0OFXLHS     Type & Screen (If Applicable):  No results found for: LABABO, [address]    Anesthesia Evaluation  Patient summary reviewed and Nursing notes reviewed no history of anesthetic complications:   Airway: Mallampati: II  TM distance: >3 FB   Neck ROM: full  Mouth opening: > = 3 FB Dental:    (+) upper dentures and lower dentures      Pulmonary:normal exam    (+) current smoker          Patient smoked on day of surgery.                  Cardiovascular:Negative CV ROS             Beta Blocker:  Not on Beta Blocker         Neuro/Psych:      (-) seizures and CVA           GI/Hepatic/Renal:   (+) GERD: poorly controlled,           Endo/Other: Negative Endo/Other ROS       (-) diabetes mellitus, hypothyroidism               Abdominal:           Vascular: negative vascular ROS. Anesthesia Plan      general and regional     ASA 2     (Sciatic nerve block)  Induction: intravenous. MIPS: Postoperative opioids intended and Prophylactic antiemetics administered. Anesthetic plan and risks discussed with patient. Plan discussed with CRNA.                 Kierra Acharya MD   7/15/2019

## 2019-11-04 ENCOUNTER — OFFICE VISIT (OUTPATIENT)
Dept: FAMILY MEDICINE CLINIC | Facility: CLINIC | Age: 54
End: 2019-11-04

## 2019-11-04 VITALS
OXYGEN SATURATION: 99 % | HEART RATE: 80 BPM | BODY MASS INDEX: 24.4 KG/M2 | SYSTOLIC BLOOD PRESSURE: 118 MMHG | TEMPERATURE: 98 F | WEIGHT: 161 LBS | HEIGHT: 68 IN | DIASTOLIC BLOOD PRESSURE: 78 MMHG | RESPIRATION RATE: 14 BRPM

## 2019-11-04 DIAGNOSIS — K14.8 TONGUE LESION: ICD-10-CM

## 2019-11-04 DIAGNOSIS — L98.9 SKIN LESION: ICD-10-CM

## 2019-11-04 DIAGNOSIS — M25.551 PAIN OF RIGHT HIP JOINT: Primary | ICD-10-CM

## 2019-11-04 PROCEDURE — 99213 OFFICE O/P EST LOW 20 MIN: CPT | Performed by: FAMILY MEDICINE

## 2019-11-04 RX ORDER — ACETAMINOPHEN 500 MG
500 TABLET ORAL EVERY 6 HOURS PRN
COMMUNITY
End: 2020-01-07

## 2019-11-04 RX ORDER — TRAMADOL HYDROCHLORIDE 50 MG/1
TABLET ORAL
Refills: 0 | COMMUNITY
Start: 2019-10-18 | End: 2020-04-16

## 2019-11-04 NOTE — PROGRESS NOTES
"Ashley Myers is a 54 y.o. male.     Chief Complaint   Patient presents with   • Annual Exam     check spot on face, bumps on tongue   • Leg Pain     Right leg painful from hip to knee       History of Present Illness     he is noting pain in the lower back and right hip for 6mos---he occ will have trouble rasiing the leg      Current Outpatient Medications:   •  acetaminophen (TYLENOL) 500 MG tablet, Take 500 mg by mouth Every 6 (Six) Hours As Needed for Mild Pain ., Disp: , Rfl:   •  esomeprazole (nexIUM) 40 MG capsule, Take 1 capsule by mouth 2 (Two) Times a Day Before Meals., Disp: 60 capsule, Rfl: 0  •  traMADol (ULTRAM) 50 MG tablet, TK 1 T PO  Q 8 HOURS PRN, Disp: , Rfl: 0  •  aluminum-magnesium hydroxide-simethicone (MAALOX) 200-200-20 MG/5ML suspension, Take 30 mL by mouth 4 (Four) Times a Day Before Meals & at Bedtime., Disp: 1120 mL, Rfl: 0  •  famotidine (PEPCID) 40 MG tablet, Take 1 tablet by mouth Every Night., Disp: 30 tablet, Rfl: 0  No Known Allergies    Past Medical History:   Diagnosis Date   • GERD (gastroesophageal reflux disease)    • Injury of back    • Prostate CA (CMS/HCC)      Past Surgical History:   Procedure Laterality Date   • BACK SURGERY      Patient has had 2 previous back surgeries.    • PROSTATE SURGERY         Review of Systems   Constitutional: Negative.    HENT: Negative.    Eyes: Negative.    Respiratory: Negative.    Cardiovascular: Negative.    Gastrointestinal: Negative.    Endocrine: Negative.    Genitourinary: Negative.    Musculoskeletal: Negative.    Skin: Positive for color change.   Allergic/Immunologic: Negative.    Neurological: Negative.    Hematological: Negative.    Psychiatric/Behavioral: Negative.        Objective  /78 (BP Location: Left arm)   Pulse 80   Temp 98 °F (36.7 °C)   Resp 14   Ht 172.7 cm (68\")   Wt 73 kg (161 lb)   SpO2 99%   BMI 24.48 kg/m²   Physical Exam   Constitutional: He appears well-developed and well-nourished. "   HENT:   Head: Normocephalic and atraumatic.   Right Ear: External ear normal.   Left Ear: External ear normal.   Nose: Nose normal.   Mouth/Throat: Oropharynx is clear and moist.   Eyes: Conjunctivae and EOM are normal. Pupils are equal, round, and reactive to light.   Neck: Normal range of motion. Neck supple.   Cardiovascular: Normal rate, regular rhythm, normal heart sounds and intact distal pulses.   Pulmonary/Chest: Effort normal and breath sounds normal.   Abdominal: Soft. Bowel sounds are normal.   Musculoskeletal: Normal range of motion.   Neurological: He is alert.   Skin: Skin is warm. Capillary refill takes less than 2 seconds.   Psychiatric: He has a normal mood and affect. His behavior is normal. Judgment and thought content normal.   Nursing note and vitals reviewed.  noted macular lesion right cheek 1cm    Assessment/Plan   Magalie was seen today for annual exam and leg pain.    Diagnoses and all orders for this visit:    Pain of right hip joint  -     XR Hip With or Without Pelvis 2 - 3 View Right    Skin lesion  -     Ambulatory Referral to Dermatology    Tongue lesion  -     Ambulatory Referral to ENT (Otolaryngology)                 Orders Placed This Encounter   Procedures   • XR Hip With or Without Pelvis 2 - 3 View Right     Order Specific Question:   Reason for Exam:     Answer:   hip pain   • Ambulatory Referral to Dermatology     Referral Priority:   Routine     Referral Type:   Consultation     Referral Reason:   Specialty Services Required     Requested Specialty:   Dermatology     Number of Visits Requested:   1   • Ambulatory Referral to ENT (Otolaryngology)     Referral Priority:   Routine     Referral Type:   Consultation     Referral Reason:   Specialty Services Required     Referred to Provider:   Gelacio Mendoza MD     Requested Specialty:   Otolaryngology     Number of Visits Requested:   1       Follow up: 4 week(s)

## 2019-12-09 ENCOUNTER — OFFICE VISIT (OUTPATIENT)
Dept: FAMILY MEDICINE CLINIC | Facility: CLINIC | Age: 54
End: 2019-12-09

## 2019-12-09 VITALS
OXYGEN SATURATION: 99 % | BODY MASS INDEX: 25.46 KG/M2 | TEMPERATURE: 98 F | HEIGHT: 68 IN | WEIGHT: 168 LBS | DIASTOLIC BLOOD PRESSURE: 80 MMHG | SYSTOLIC BLOOD PRESSURE: 122 MMHG | RESPIRATION RATE: 16 BRPM | HEART RATE: 75 BPM

## 2019-12-09 DIAGNOSIS — F17.200 SMOKER: ICD-10-CM

## 2019-12-09 DIAGNOSIS — Z12.11 COLON CANCER SCREENING: Primary | ICD-10-CM

## 2019-12-09 DIAGNOSIS — K21.9 GASTROESOPHAGEAL REFLUX DISEASE, ESOPHAGITIS PRESENCE NOT SPECIFIED: ICD-10-CM

## 2019-12-09 DIAGNOSIS — C61 PROSTATE CANCER (HCC): ICD-10-CM

## 2019-12-09 DIAGNOSIS — L98.9 SKIN LESION: Primary | ICD-10-CM

## 2019-12-09 PROCEDURE — 99406 BEHAV CHNG SMOKING 3-10 MIN: CPT | Performed by: FAMILY MEDICINE

## 2019-12-09 PROCEDURE — 99214 OFFICE O/P EST MOD 30 MIN: CPT | Performed by: FAMILY MEDICINE

## 2019-12-09 RX ORDER — ESOMEPRAZOLE MAGNESIUM 40 MG/1
40 CAPSULE, DELAYED RELEASE ORAL
Qty: 30 CAPSULE | Refills: 5 | Status: SHIPPED | OUTPATIENT
Start: 2019-12-09 | End: 2020-01-07

## 2019-12-09 NOTE — PROGRESS NOTES
"Ashley Myers is a 54 y.o. male.         History of Present Illness     he says he needs refill of his med for gerd--he also needs appt with dermatology--the prvious referral didnt take his insurance      Current Outpatient Medications:   •  acetaminophen (TYLENOL) 500 MG tablet, Take 500 mg by mouth Every 6 (Six) Hours As Needed for Mild Pain ., Disp: , Rfl:   •  aluminum-magnesium hydroxide-simethicone (MAALOX) 200-200-20 MG/5ML suspension, Take 30 mL by mouth 4 (Four) Times a Day Before Meals & at Bedtime., Disp: 1120 mL, Rfl: 0  •  esomeprazole (nexIUM) 40 MG capsule, Take 1 capsule by mouth 2 (Two) Times a Day Before Meals., Disp: 60 capsule, Rfl: 0  •  traMADol (ULTRAM) 50 MG tablet, TK 1 T PO  Q 8 HOURS PRN, Disp: , Rfl: 0  •  esomeprazole (nexIUM) 40 MG capsule, Take 1 capsule by mouth Every Morning Before Breakfast., Disp: 30 capsule, Rfl: 5  No Known Allergies    Past Medical History:   Diagnosis Date   • GERD (gastroesophageal reflux disease)    • Injury of back    • Prostate CA (CMS/HCC)      Past Surgical History:   Procedure Laterality Date   • BACK SURGERY      Patient has had 2 previous back surgeries.    • PROSTATE SURGERY         Review of Systems   Constitutional: Negative.    HENT: Negative.    Eyes: Negative.    Respiratory: Negative.    Cardiovascular: Negative.    Gastrointestinal: Negative.    Endocrine: Negative.    Genitourinary: Negative.    Musculoskeletal: Negative.    Skin: Negative.    Allergic/Immunologic: Negative.    Neurological: Negative.    Hematological: Negative.    Psychiatric/Behavioral: Negative.        Objective  /80 (BP Location: Left arm)   Pulse 75   Temp 98 °F (36.7 °C)   Resp 16   Ht 172.7 cm (68\")   Wt 76.2 kg (168 lb)   SpO2 99%   BMI 25.54 kg/m²   Physical Exam   Constitutional: He is oriented to person, place, and time. He appears well-developed and well-nourished.   HENT:   Head: Normocephalic and atraumatic.   Right Ear: External ear " normal.   Left Ear: External ear normal.   Nose: Nose normal.   Mouth/Throat: Oropharynx is clear and moist.   Eyes: Pupils are equal, round, and reactive to light. Conjunctivae and EOM are normal.   Neck: Normal range of motion. Neck supple.   Cardiovascular: Normal rate, regular rhythm, normal heart sounds and intact distal pulses.   Pulmonary/Chest: Effort normal and breath sounds normal.   Abdominal: Soft. Bowel sounds are normal.   Musculoskeletal: Normal range of motion.   Neurological: He is alert and oriented to person, place, and time.   Skin: Skin is warm. Capillary refill takes less than 2 seconds.   Psychiatric: He has a normal mood and affect. His behavior is normal. Judgment and thought content normal.   Vitals reviewed.      Assessment/Plan   Magalie was seen today for follow-up.    Diagnoses and all orders for this visit:    Skin lesion  -     Ambulatory Referral to Dermatology    Smoker    Gastroesophageal reflux disease, esophagitis presence not specified    Prostate cancer (CMS/HCC)  -     CBC w AUTO Differential  -     Comprehensive metabolic panel  -     Lipid Panel With / Chol / HDL Ratio  -     PSA Screen  -     Urinalysis without microscopic (no culture) - Urine, Clean Catch    Other orders  -     esomeprazole (nexIUM) 40 MG capsule; Take 1 capsule by mouth Every Morning Before Breakfast.      Magalie Myers is a current cigarettes user.  He currently smokes 1 pack of cigarettes per day for a duration of 30 years. I have educated him on the risk of diseases from using tobacco products such as cancer, COPD and heart diease.     I advised him to quit and he is willing to quit. We have discussed the following method/s for tobacco cessation:  Education Material.  Together we have set a quit date for 1 month from today.  He will follow up with me in 4 month or sooner to check on his progress.    I spent 5 minutes counseling the patient.               Orders Placed This Encounter   Procedures   •  Comprehensive metabolic panel   • Lipid Panel With / Chol / HDL Ratio   • PSA Screen   • Urinalysis without microscopic (no culture) - Urine, Clean Catch   • Ambulatory Referral to Dermatology     Referral Priority:   Routine     Referral Type:   Consultation     Referral Reason:   Specialty Services Required     Requested Specialty:   Dermatology     Number of Visits Requested:   1   • CBC w AUTO Differential     Order Specific Question:   Manual Differential     Answer:   No       Follow up: 4 month(s)

## 2019-12-10 ENCOUNTER — TELEPHONE (OUTPATIENT)
Dept: FAMILY MEDICINE CLINIC | Facility: CLINIC | Age: 54
End: 2019-12-10

## 2019-12-10 LAB
ALBUMIN SERPL-MCNC: 4.4 G/DL (ref 3.5–5.5)
ALBUMIN/GLOB SERPL: 1.5 {RATIO} (ref 1.2–2.2)
ALP SERPL-CCNC: 130 IU/L (ref 39–117)
ALT SERPL-CCNC: 22 IU/L (ref 0–44)
APPEARANCE UR: CLEAR
AST SERPL-CCNC: 20 IU/L (ref 0–40)
BASOPHILS # BLD AUTO: 0 X10E3/UL (ref 0–0.2)
BASOPHILS NFR BLD AUTO: 0 %
BILIRUB SERPL-MCNC: 0.3 MG/DL (ref 0–1.2)
BILIRUB UR QL STRIP: NEGATIVE
BUN SERPL-MCNC: 5 MG/DL (ref 6–24)
BUN/CREAT SERPL: 6 (ref 9–20)
CALCIUM SERPL-MCNC: 9.1 MG/DL (ref 8.7–10.2)
CHLORIDE SERPL-SCNC: 97 MMOL/L (ref 96–106)
CHOLEST SERPL-MCNC: 252 MG/DL (ref 100–199)
CHOLEST/HDLC SERPL: 9 RATIO (ref 0–5)
CO2 SERPL-SCNC: 23 MMOL/L (ref 20–29)
COLOR UR: YELLOW
CREAT SERPL-MCNC: 0.85 MG/DL (ref 0.76–1.27)
EOSINOPHIL # BLD AUTO: 0.2 X10E3/UL (ref 0–0.4)
EOSINOPHIL NFR BLD AUTO: 2 %
ERYTHROCYTE [DISTWIDTH] IN BLOOD BY AUTOMATED COUNT: 13.1 % (ref 12.3–15.4)
GLOBULIN SER CALC-MCNC: 3 G/DL (ref 1.5–4.5)
GLUCOSE SERPL-MCNC: 92 MG/DL (ref 65–99)
GLUCOSE UR QL: NEGATIVE
HCT VFR BLD AUTO: 42.7 % (ref 37.5–51)
HDLC SERPL-MCNC: 28 MG/DL
HGB BLD-MCNC: 14.8 G/DL (ref 13–17.7)
HGB UR QL STRIP: NEGATIVE
IMM GRANULOCYTES # BLD AUTO: 0 X10E3/UL (ref 0–0.1)
IMM GRANULOCYTES NFR BLD AUTO: 0 %
KETONES UR QL STRIP: NEGATIVE
LDLC SERPL CALC-MCNC: 171 MG/DL (ref 0–99)
LEUKOCYTE ESTERASE UR QL STRIP: NEGATIVE
LYMPHOCYTES # BLD AUTO: 3.9 X10E3/UL (ref 0.7–3.1)
LYMPHOCYTES NFR BLD AUTO: 38 %
MCH RBC QN AUTO: 31 PG (ref 26.6–33)
MCHC RBC AUTO-ENTMCNC: 34.7 G/DL (ref 31.5–35.7)
MCV RBC AUTO: 89 FL (ref 79–97)
MONOCYTES # BLD AUTO: 0.6 X10E3/UL (ref 0.1–0.9)
MONOCYTES NFR BLD AUTO: 6 %
NEUTROPHILS # BLD AUTO: 5.3 X10E3/UL (ref 1.4–7)
NEUTROPHILS NFR BLD AUTO: 54 %
NITRITE UR QL STRIP: NEGATIVE
PH UR STRIP: 6 [PH] (ref 5–7.5)
PLATELET # BLD AUTO: 215 X10E3/UL (ref 150–450)
POTASSIUM SERPL-SCNC: 3.8 MMOL/L (ref 3.5–5.2)
PROT SERPL-MCNC: 7.4 G/DL (ref 6–8.5)
PROT UR QL STRIP: NEGATIVE
PSA SERPL-MCNC: <0.1 NG/ML (ref 0–4)
RBC # BLD AUTO: 4.78 X10E6/UL (ref 4.14–5.8)
SODIUM SERPL-SCNC: 136 MMOL/L (ref 134–144)
SP GR UR: 1.01 (ref 1–1.03)
TRIGL SERPL-MCNC: 266 MG/DL (ref 0–149)
UROBILINOGEN UR STRIP-MCNC: 0.2 MG/DL (ref 0.2–1)
VLDLC SERPL CALC-MCNC: 53 MG/DL (ref 5–40)
WBC # BLD AUTO: 10 X10E3/UL (ref 3.4–10.8)

## 2019-12-10 RX ORDER — ATORVASTATIN CALCIUM 10 MG/1
10 TABLET, FILM COATED ORAL DAILY
Qty: 30 TABLET | Refills: 4 | Status: SHIPPED | OUTPATIENT
Start: 2019-12-10 | End: 2020-08-31 | Stop reason: SINTOL

## 2019-12-10 RX ORDER — PANTOPRAZOLE SODIUM 40 MG/1
40 TABLET, DELAYED RELEASE ORAL DAILY
Qty: 30 TABLET | Refills: 1 | Status: SHIPPED | OUTPATIENT
Start: 2019-12-10 | End: 2020-02-10

## 2019-12-10 NOTE — TELEPHONE ENCOUNTER
PT called stating that he needs something for his heartburn besides nexium. His insurance does not cover it. [facility]

## 2020-01-07 ENCOUNTER — OFFICE VISIT (OUTPATIENT)
Dept: FAMILY MEDICINE CLINIC | Facility: CLINIC | Age: 55
End: 2020-01-07

## 2020-01-07 VITALS
SYSTOLIC BLOOD PRESSURE: 112 MMHG | DIASTOLIC BLOOD PRESSURE: 88 MMHG | WEIGHT: 165 LBS | HEIGHT: 68 IN | BODY MASS INDEX: 25.01 KG/M2 | HEART RATE: 82 BPM | RESPIRATION RATE: 16 BRPM | OXYGEN SATURATION: 98 %

## 2020-01-07 DIAGNOSIS — S82.402D CLOSED FRACTURE OF SHAFT OF LEFT FIBULA WITH ROUTINE HEALING, UNSPECIFIED FRACTURE MORPHOLOGY, SUBSEQUENT ENCOUNTER: Primary | ICD-10-CM

## 2020-01-07 PROCEDURE — 99213 OFFICE O/P EST LOW 20 MIN: CPT | Performed by: FAMILY MEDICINE

## 2020-01-07 NOTE — PROGRESS NOTES
"Ashley Myers is a 54 y.o. male.         History of Present Illness     he recently sustained a fracture in his tibula and fibula and is seeing dr peralta      Current Outpatient Medications:   •  atorvastatin (LIPITOR) 10 MG tablet, Take 1 tablet by mouth Daily., Disp: 30 tablet, Rfl: 4  •  pantoprazole (PROTONIX) 40 MG EC tablet, Take 1 tablet by mouth Daily., Disp: 30 tablet, Rfl: 1  •  traMADol (ULTRAM) 50 MG tablet, TK 1 T PO  Q 8 HOURS PRN, Disp: , Rfl: 0  No Known Allergies    Past Medical History:   Diagnosis Date   • GERD (gastroesophageal reflux disease)    • Injury of back    • Prostate CA (CMS/HCC)      Past Surgical History:   Procedure Laterality Date   • BACK SURGERY      Patient has had 2 previous back surgeries.    • PROSTATE SURGERY         Review of Systems   Constitutional: Negative.    HENT: Negative.    Eyes: Negative.    Respiratory: Negative.    Cardiovascular: Negative.    Gastrointestinal: Negative.    Endocrine: Negative.    Genitourinary: Negative.    Musculoskeletal: Positive for gait problem.   Skin: Negative.    Allergic/Immunologic: Negative.    Hematological: Negative.    Psychiatric/Behavioral: Negative.        Objective  /88   Pulse 82   Resp 16   Ht 172.7 cm (68\")   Wt 74.8 kg (165 lb)   SpO2 98%   BMI 25.09 kg/m²   Physical Exam   Constitutional: He is oriented to person, place, and time. He appears well-developed and well-nourished.   HENT:   Head: Normocephalic and atraumatic.   Eyes: Pupils are equal, round, and reactive to light. Conjunctivae and EOM are normal.   Neck: Normal range of motion. Neck supple.   Cardiovascular: Regular rhythm.   Pulmonary/Chest: Effort normal.   Abdominal: Soft.   Musculoskeletal: Normal range of motion.   Neurological: He is alert and oriented to person, place, and time.   Skin: Skin is warm. Capillary refill takes less than 2 seconds.   Psychiatric: He has a normal mood and affect. His behavior is normal. Judgment and " thought content normal.   Nursing note and vitals reviewed.      Assessment/Plan   Magalie was seen today for follow-up.    Diagnoses and all orders for this visit:    Closed fracture of shaft of left fibula with routine healing, unspecified fracture morphology, subsequent encounter  -     DEXA Bone Density Axial                 Orders Placed This Encounter   Procedures   • DEXA Bone Density Axial     Order Specific Question:   Reason for Exam:     Answer:   assess for osteoporosis     Order Specific Question:   Does this patient have a diabetic monitoring/medication delivering device on?     Answer:   No       Follow up: 3 month(s)

## 2020-02-10 RX ORDER — PANTOPRAZOLE SODIUM 40 MG/1
40 TABLET, DELAYED RELEASE ORAL DAILY
Qty: 30 TABLET | Refills: 1 | Status: SHIPPED | OUTPATIENT
Start: 2020-02-10 | End: 2020-02-28 | Stop reason: SDUPTHER

## 2020-02-28 ENCOUNTER — TELEPHONE (OUTPATIENT)
Dept: FAMILY MEDICINE CLINIC | Facility: CLINIC | Age: 55
End: 2020-02-28

## 2020-02-28 RX ORDER — PANTOPRAZOLE SODIUM 40 MG/1
40 TABLET, DELAYED RELEASE ORAL DAILY
Qty: 30 TABLET | Refills: 1 | Status: SHIPPED | OUTPATIENT
Start: 2020-02-28 | End: 2020-08-31

## 2020-02-28 NOTE — TELEPHONE ENCOUNTER
PT CALLED TO REQUEST A RE[facility] FOR [facility]oprazole (PROTONIX) 40 MG EC tablet. [facility]

## 2020-03-04 ENCOUNTER — HOSPITAL ENCOUNTER (OUTPATIENT)
Dept: CT IMAGING | Age: 55
Discharge: HOME | End: 2020-03-04
Payer: COMMERCIAL

## 2020-03-04 PROCEDURE — 73700 CT LOWER EXTREMITY W/O DYE: CPT

## 2020-04-16 ENCOUNTER — OFFICE VISIT (OUTPATIENT)
Dept: FAMILY MEDICINE CLINIC | Facility: CLINIC | Age: 55
End: 2020-04-16

## 2020-04-16 ENCOUNTER — TELEPHONE (OUTPATIENT)
Dept: FAMILY MEDICINE CLINIC | Facility: CLINIC | Age: 55
End: 2020-04-16

## 2020-04-16 VITALS — BODY MASS INDEX: 24.25 KG/M2 | HEIGHT: 68 IN | WEIGHT: 160 LBS

## 2020-04-16 DIAGNOSIS — S82.402D CLOSED FRACTURE OF SHAFT OF LEFT FIBULA WITH ROUTINE HEALING, UNSPECIFIED FRACTURE MORPHOLOGY, SUBSEQUENT ENCOUNTER: Primary | ICD-10-CM

## 2020-04-16 DIAGNOSIS — S82.402D CLOSED FRACTURE OF SHAFT OF LEFT FIBULA WITH ROUTINE HEALING, UNSPECIFIED FRACTURE MORPHOLOGY, SUBSEQUENT ENCOUNTER: ICD-10-CM

## 2020-04-16 PROCEDURE — 99213 OFFICE O/P EST LOW 20 MIN: CPT | Performed by: FAMILY MEDICINE

## 2020-04-16 NOTE — PROGRESS NOTES
"Subjective   Magalie Myers is a 54 y.o. male.     Chief Complaint   Patient presents with   • Leg Pain     pt states that he has broken his left leg 2 times in the past 18 months.  he says that the break was in the same spot both times.  he states that he continues to have pain in thsi leg.    You have chosen to receive care through a telephone visit. Do you consent to use a telephone visit for your medical care today? No    History of Present Illness     he says he was released from orthopedics a few weeks ago due to  fibular fx---he would liike to get an xray to check the healing       Current Outpatient Medications:   •  atorvastatin (LIPITOR) 10 MG tablet, Take 1 tablet by mouth Daily., Disp: 30 tablet, Rfl: 4  •  pantoprazole (PROTONIX) 40 MG EC tablet, Take 1 tablet by mouth Daily., Disp: 30 tablet, Rfl: 1  No Known Allergies    Past Medical History:   Diagnosis Date   • GERD (gastroesophageal reflux disease)    • Injury of back    • Prostate CA (CMS/HCC)      Past Surgical History:   Procedure Laterality Date   • BACK SURGERY      Patient has had 2 previous back surgeries.    • PROSTATE SURGERY         Review of Systems   Constitutional: Negative.    HENT: Negative.    Eyes: Negative.    Respiratory: Negative.    Cardiovascular: Negative.    Gastrointestinal: Negative.    Endocrine: Negative.    Genitourinary: Negative.    Musculoskeletal: Positive for arthralgias.   Skin: Negative.    Allergic/Immunologic: Negative.    Neurological: Negative.    Hematological: Negative.    Psychiatric/Behavioral: Negative.        Objective  Ht 172.7 cm (68\")   Wt 72.6 kg (160 lb)   BMI 24.33 kg/m²   Physical Exam    Assessment/Plan   Magalie was seen today for leg pain.    Diagnoses and all orders for this visit:    Closed fracture of shaft of left fibula with routine healing, unspecified fracture morphology, subsequent encounter  -     XR tibia fibula 2 vw left; Future        He will call tomorrow for the report  This " visit has been rescheduled as a phone visit to comply with patient safety concerns in accordance with CDC recommendations. Total time of discussion was 15 minutes.           Orders Placed This Encounter   Procedures   • XR tibia fibula 2 vw left     Standing Status:   Future     Standing Expiration Date:   4/16/2021     Order Specific Question:   Reason for Exam:     Answer:   hx of fibular fx     Order Specific Question:   Does this patient have a diabetic monitoring/medication delivering device on?     Answer:   No       Follow up next appt

## 2020-04-16 NOTE — TELEPHONE ENCOUNTER
Patient broke Leg in July 2019, and then re-broke it Jan 2020 and is now in a lot of pain. Patient has been released from orthopedics but unsure if going to orthopedics would be paid for by worker comp and doesn't want to pay out of pocket if not.     Patient is requesting an order for a x-ray so that he can see if the leg is still broken. He states that he is in a lot of pain and it just doesn't feel right.     Please contact and advise.

## 2020-05-06 ENCOUNTER — TELEPHONE (OUTPATIENT)
Dept: FAMILY MEDICINE CLINIC | Facility: CLINIC | Age: 55
End: 2020-05-06

## 2020-07-17 ENCOUNTER — OFFICE VISIT (OUTPATIENT)
Dept: FAMILY MEDICINE CLINIC | Facility: CLINIC | Age: 55
End: 2020-07-17

## 2020-07-17 VITALS
TEMPERATURE: 98.5 F | WEIGHT: 167 LBS | SYSTOLIC BLOOD PRESSURE: 122 MMHG | OXYGEN SATURATION: 99 % | HEIGHT: 68 IN | DIASTOLIC BLOOD PRESSURE: 78 MMHG | RESPIRATION RATE: 16 BRPM | BODY MASS INDEX: 25.31 KG/M2 | HEART RATE: 74 BPM

## 2020-07-17 DIAGNOSIS — K21.00 GASTROESOPHAGEAL REFLUX DISEASE WITH ESOPHAGITIS: Primary | ICD-10-CM

## 2020-07-17 DIAGNOSIS — R13.10 DYSPHAGIA, UNSPECIFIED TYPE: ICD-10-CM

## 2020-07-17 PROCEDURE — 99213 OFFICE O/P EST LOW 20 MIN: CPT | Performed by: FAMILY MEDICINE

## 2020-07-17 NOTE — PROGRESS NOTES
"Ashley Myers is a 54 y.o. male.     Chief Complaint   Patient presents with   • Difficulty Swallowing        History of Present Illness     he nots having issues with dysphagia wth foods for about 6 weeks  and heartburn      Current Outpatient Medications:   •  atorvastatin (LIPITOR) 10 MG tablet, Take 1 tablet by mouth Daily., Disp: 30 tablet, Rfl: 4  •  pantoprazole (PROTONIX) 40 MG EC tablet, Take 1 tablet by mouth Daily., Disp: 30 tablet, Rfl: 1  No Known Allergies    Past Medical History:   Diagnosis Date   • GERD (gastroesophageal reflux disease)    • Injury of back    • Prostate CA (CMS/HCC)      Past Surgical History:   Procedure Laterality Date   • BACK SURGERY      Patient has had 2 previous back surgeries.    • PROSTATE SURGERY         Review of Systems   Constitutional: Negative.    HENT: Negative.    Eyes: Negative.    Respiratory: Negative.    Cardiovascular: Negative.    Gastrointestinal: Negative.    Endocrine: Negative.    Genitourinary: Negative.    Musculoskeletal: Negative.    Skin: Negative.    Allergic/Immunologic: Negative.    Neurological: Negative.    Hematological: Negative.    Psychiatric/Behavioral: Negative.        Objective  /78   Pulse 74   Temp 98.5 °F (36.9 °C)   Resp 16   Ht 172.7 cm (68\")   Wt 75.8 kg (167 lb)   SpO2 99%   BMI 25.39 kg/m²   Physical Exam   Constitutional: He is oriented to person, place, and time. He appears well-developed and well-nourished.   HENT:   Head: Normocephalic and atraumatic.   Right Ear: External ear normal.   Eyes: Pupils are equal, round, and reactive to light. Conjunctivae and EOM are normal.   Neck: Normal range of motion. Neck supple.   Cardiovascular: Normal rate, regular rhythm, normal heart sounds and intact distal pulses.   Pulmonary/Chest: Effort normal and breath sounds normal.   Abdominal: Soft. Bowel sounds are normal.   Musculoskeletal: Normal range of motion.   Neurological: He is alert and oriented to person, " place, and time.   Skin: Skin is warm. Capillary refill takes less than 2 seconds.   Psychiatric: He has a normal mood and affect. His behavior is normal. Judgment and thought content normal.   Nursing note and vitals reviewed.      Assessment/Plan   Magalie was seen today for difficulty swallowing.    Diagnoses and all orders for this visit:    Gastroesophageal reflux disease with esophagitis  -     Ambulatory Referral to Gastroenterology    Dysphagia, unspecified type  -     Ambulatory Referral to Gastroenterology                 Orders Placed This Encounter   Procedures   • Ambulatory Referral to Gastroenterology     Referral Priority:   Routine     Referral Type:   Consultation     Referral Reason:   Specialty Services Required     Requested Specialty:   Gastroenterology     Number of Visits Requested:   1       Follow up: 3 month(s)

## 2020-08-31 ENCOUNTER — OFFICE VISIT (OUTPATIENT)
Dept: FAMILY MEDICINE CLINIC | Facility: CLINIC | Age: 55
End: 2020-08-31

## 2020-08-31 VITALS
SYSTOLIC BLOOD PRESSURE: 148 MMHG | BODY MASS INDEX: 25.31 KG/M2 | DIASTOLIC BLOOD PRESSURE: 92 MMHG | OXYGEN SATURATION: 98 % | TEMPERATURE: 98.4 F | RESPIRATION RATE: 16 BRPM | WEIGHT: 167 LBS | HEART RATE: 97 BPM | HEIGHT: 68 IN

## 2020-08-31 DIAGNOSIS — M54.50 ACUTE MIDLINE LOW BACK PAIN WITHOUT SCIATICA: Primary | ICD-10-CM

## 2020-08-31 PROCEDURE — 99213 OFFICE O/P EST LOW 20 MIN: CPT | Performed by: NURSE PRACTITIONER

## 2020-08-31 PROCEDURE — 96372 THER/PROPH/DIAG INJ SC/IM: CPT | Performed by: NURSE PRACTITIONER

## 2020-08-31 RX ORDER — DEXAMETHASONE SODIUM PHOSPHATE 4 MG/ML
8 INJECTION, SOLUTION INTRA-ARTICULAR; INTRALESIONAL; INTRAMUSCULAR; INTRAVENOUS; SOFT TISSUE ONCE
Status: COMPLETED | OUTPATIENT
Start: 2020-08-31 | End: 2020-08-31

## 2020-08-31 RX ORDER — HYDROCODONE BITARTRATE AND ACETAMINOPHEN 7.5; 325 MG/1; MG/1
1 TABLET ORAL EVERY 6 HOURS PRN
Qty: 21 TABLET | Refills: 0 | Status: SHIPPED | OUTPATIENT
Start: 2020-08-31 | End: 2020-08-31

## 2020-08-31 RX ORDER — ACETAMINOPHEN 500 MG
1000 TABLET ORAL
COMMUNITY

## 2020-08-31 RX ORDER — METHYLPREDNISOLONE 4 MG/1
TABLET ORAL
Qty: 1 EACH | Refills: 0 | Status: SHIPPED | OUTPATIENT
Start: 2020-08-31 | End: 2020-10-16

## 2020-08-31 RX ORDER — METHYLPREDNISOLONE 4 MG/1
TABLET ORAL
Qty: 1 EACH | Refills: 0 | Status: SHIPPED | OUTPATIENT
Start: 2020-08-31 | End: 2020-08-31

## 2020-08-31 RX ORDER — HYDROCODONE BITARTRATE AND ACETAMINOPHEN 7.5; 325 MG/1; MG/1
1 TABLET ORAL EVERY 6 HOURS PRN
Qty: 21 TABLET | Refills: 0 | Status: SHIPPED | OUTPATIENT
Start: 2020-08-31 | End: 2020-09-03 | Stop reason: SDUPTHER

## 2020-08-31 RX ORDER — CYCLOBENZAPRINE HCL 10 MG
10 TABLET ORAL 3 TIMES DAILY PRN
Qty: 30 TABLET | Refills: 0 | Status: SHIPPED | OUTPATIENT
Start: 2020-08-31 | End: 2020-09-03 | Stop reason: SDUPTHER

## 2020-08-31 RX ORDER — ESOMEPRAZOLE MAGNESIUM 20 MG/1
40 TABLET, DELAYED RELEASE ORAL DAILY
COMMUNITY
End: 2020-12-28

## 2020-08-31 RX ORDER — CYCLOBENZAPRINE HCL 10 MG
10 TABLET ORAL 3 TIMES DAILY PRN
Qty: 30 TABLET | Refills: 0 | Status: SHIPPED | OUTPATIENT
Start: 2020-08-31 | End: 2020-08-31

## 2020-08-31 RX ADMIN — DEXAMETHASONE SODIUM PHOSPHATE 8 MG: 4 INJECTION, SOLUTION INTRA-ARTICULAR; INTRALESIONAL; INTRAMUSCULAR; INTRAVENOUS; SOFT TISSUE at 08:59

## 2020-08-31 NOTE — PROGRESS NOTES
Subjective   Chief Complaint:  Low back pain    History of Present Illness:  This 55 y.o. male was seen in the office today for acute low back pain.  He does have chronic problems and a history of a prior fusion.  He had an incident this morning where he twisted and certain direction and had an acute episode back pain, he presents today with poor posture and palpable muscle spasms.  Denies any neuro deficits such as arm or leg dysfunction, numbness or tingling, no bowel or bladder changes.    No Known Allergies   Current Outpatient Medications on File Prior to Visit   Medication Sig   • acetaminophen (TYLENOL) 500 MG tablet Take 1,000 mg by mouth.   • Esomeprazole Magnesium 20 MG tablet delayed-release Take 40 mg by mouth Daily.   • [DISCONTINUED] atorvastatin (LIPITOR) 10 MG tablet Take 1 tablet by mouth Daily.   • [DISCONTINUED] pantoprazole (PROTONIX) 40 MG EC tablet Take 1 tablet by mouth Daily.     No current facility-administered medications on file prior to visit.       Past Medical, Surgical, Social, and Family History:  Past Medical History:   Diagnosis Date   • GERD (gastroesophageal reflux disease)    • Injury of back    • Prostate CA (CMS/[facility])      Past Surgical History:   Procedure Laterality Date   • BACK SURGERY      Patient has had 2 previous back surgeries.    • PROSTATE SURGERY       Social History     Socioeconomic History   • Marital status:      Spouse name: Not on file   • Number of children: Not on file   • Years of education: Not on file   • Highest education level: Not on file   Tobacco Use   • Smoking status: Current Every Day Smoker     Packs/day: 1.00     Types: Cigarettes   • Smokeless tobacco: Never Used   Substance and Sexual Activity   • Alcohol use: No   • Drug use: No   • Sexual activity: Defer     Family History   Problem Relation Age of Onset   • Arthritis Mother    • Heart disease Father    • Cancer Father    • No Known Problems Sister    • No Known Problems Brother    •  "No Known Problems Brother      Review of Systems   Constitutional: Negative for chills and fever.   Respiratory: Negative for cough and shortness of breath.    Cardiovascular: Negative for chest pain and palpitations.   Musculoskeletal: Positive for back pain. Negative for arthralgias, myalgias and neck pain.     Objective   Physical Exam   Constitutional: He is oriented to person, place, and time. No distress.   HENT:   Head: Normocephalic and atraumatic.   Nose: Nose normal.   Eyes: Pupils are equal, round, and reactive to light. Conjunctivae and EOM are normal.   Neck: Normal range of motion. Neck supple. No JVD present. No tracheal deviation present. No thyromegaly present.   Cardiovascular: Normal rate, regular rhythm, normal heart sounds and intact distal pulses. Exam reveals no gallop and no friction rub.   No murmur heard.  Pulmonary/Chest: Effort normal and breath sounds normal. No respiratory distress. He has no wheezes.   Abdominal: Soft. Bowel sounds are normal. There is no tenderness. There is no guarding.   Musculoskeletal: Normal range of motion. He exhibits tenderness. He exhibits no edema or deformity.   Guarded posture.  Palpable spasm bilateral low back.  Palpable tenderness midline.   Lymphadenopathy:     He has no cervical adenopathy.   Neurological: He is alert and oriented to person, place, and time.   Skin: Skin is warm and dry. Capillary refill takes less than 2 seconds. He is not diaphoretic.   Psychiatric: He has a normal mood and affect. His behavior is normal. Judgment and thought content normal.   Nursing note and vitals reviewed.  /92   Pulse 97   Temp 98.4 °F (36.9 °C)   Resp 16   Ht 172.7 cm (68\")   Wt 75.8 kg (167 lb)   SpO2 98%   BMI 25.39 kg/m²     Assessment/Plan   Diagnoses and all orders for this visit:    1. Acute midline low back pain without sciatica (Primary)  -     MRI Lumbar Spine Without Contrast; Future  -     methylPREDNISolone (MEDROL) 4 MG dose pack; " Take as directed on package instructions.  Dispense: 1 each; Refill: 0  -     HYDROcodone-acetaminophen (Norco) 7.5-325 MG per tablet; Take 1 tablet by mouth Every 6 (Six) Hours As Needed for Moderate Pain .  Dispense: 21 tablet; Refill: 0  -     cyclobenzaprine (FLEXERIL) 10 MG tablet; Take 1 tablet by mouth 3 (Three) Times a Day As Needed for Muscle Spasms.  Dispense: 30 tablet; Refill: 0    Discussion:  Advised and educated plan of care.  Advised that ultimately would like to get an MRI to see if any major injury or ruptured disc in the area.  Will give anti-inflammatories-steroidals, advised ice, Norco for breakthrough pain, advised not to drive while medicated.  Flexeril for the spasms.  Advised neurologic change symptoms to go to the emergency department including bowel or bladder dysfunction, arm or leg dysfunction.    Start steroid pack tomorrow.    Ice 4 times a day for 15 minutes    Alternate norco and flexeril.    Follow-up:  Return for Will call results and coordinate next steps..    Note e-Signed by ALEJANDRA Polanco on 08/31/2020 at 8:46 AM

## 2020-09-03 ENCOUNTER — TELEPHONE (OUTPATIENT)
Dept: FAMILY MEDICINE CLINIC | Facility: CLINIC | Age: 55
End: 2020-09-03

## 2020-09-03 DIAGNOSIS — M54.50 ACUTE MIDLINE LOW BACK PAIN WITHOUT SCIATICA: ICD-10-CM

## 2020-09-03 DIAGNOSIS — M54.50 ACUTE MIDLINE LOW BACK PAIN WITHOUT SCIATICA: Primary | ICD-10-CM

## 2020-09-03 RX ORDER — CYCLOBENZAPRINE HCL 10 MG
10 TABLET ORAL 3 TIMES DAILY PRN
Qty: 30 TABLET | Refills: 0 | Status: SHIPPED | OUTPATIENT
Start: 2020-09-03 | End: 2020-09-09 | Stop reason: SDUPTHER

## 2020-09-03 RX ORDER — HYDROCODONE BITARTRATE AND ACETAMINOPHEN 7.5; 325 MG/1; MG/1
1 TABLET ORAL EVERY 6 HOURS PRN
Qty: 21 TABLET | Refills: 0 | Status: SHIPPED | OUTPATIENT
Start: 2020-09-03 | End: 2020-09-09 | Stop reason: SDUPTHER

## 2020-09-03 NOTE — TELEPHONE ENCOUNTER
Called pt no ans and no voicemail set up    HUB ok to read:  His medication has been sent in to the pharmacy

## 2020-09-03 NOTE — TELEPHONE ENCOUNTER
Pt still hurting so would like to go today to [facility] to get Xray.  He is also requesting a refill of pain meds because he states it will run out this weekend.    ===View-only below this line===      ----- Message -----  From: Shai Tran APRN  Sent: 9/2/2020   2:28 PM CDT  To: Reji Gaviria  Subject: RE: MRI Denial                                   Meds as ordered.  If he is still having problems, have him call us and we can proceed with a XR.    ----- Message -----  From: Carolina Vu, Reji Rep  Sent: 9/2/2020   1:48 PM CDT  To: ALEJANDRA Polanco  Subject: MRI Denial                                       Received denial on MRI.  How would you like to proceed?

## 2020-09-04 DIAGNOSIS — M54.50 ACUTE MIDLINE LOW BACK PAIN WITHOUT SCIATICA: ICD-10-CM

## 2020-09-09 DIAGNOSIS — M54.50 ACUTE MIDLINE LOW BACK PAIN WITHOUT SCIATICA: ICD-10-CM

## 2020-09-09 DIAGNOSIS — G89.29 CHRONIC BILATERAL LOW BACK PAIN, UNSPECIFIED WHETHER SCIATICA PRESENT: Primary | ICD-10-CM

## 2020-09-09 DIAGNOSIS — M54.50 CHRONIC BILATERAL LOW BACK PAIN, UNSPECIFIED WHETHER SCIATICA PRESENT: Primary | ICD-10-CM

## 2020-09-09 DIAGNOSIS — M54.50 ACUTE MIDLINE LOW BACK PAIN WITHOUT SCIATICA: Primary | ICD-10-CM

## 2020-09-09 RX ORDER — CYCLOBENZAPRINE HCL 10 MG
10 TABLET ORAL 3 TIMES DAILY PRN
Qty: 30 TABLET | Refills: 0 | Status: SHIPPED | OUTPATIENT
Start: 2020-09-09 | End: 2020-10-08 | Stop reason: SDUPTHER

## 2020-09-09 RX ORDER — HYDROCODONE BITARTRATE AND ACETAMINOPHEN 7.5; 325 MG/1; MG/1
1 TABLET ORAL EVERY 6 HOURS PRN
Qty: 21 TABLET | Refills: 0 | Status: SHIPPED | OUTPATIENT
Start: 2020-09-09 | End: 2020-10-08 | Stop reason: SDUPTHER

## 2020-09-14 ENCOUNTER — TELEPHONE (OUTPATIENT)
Dept: FAMILY MEDICINE CLINIC | Facility: CLINIC | Age: 55
End: 2020-09-14

## 2020-10-07 DIAGNOSIS — M54.50 ACUTE MIDLINE LOW BACK PAIN WITHOUT SCIATICA: ICD-10-CM

## 2020-10-07 RX ORDER — CYCLOBENZAPRINE HCL 10 MG
10 TABLET ORAL 3 TIMES DAILY PRN
Qty: 21 TABLET | Refills: 0 | OUTPATIENT
Start: 2020-10-07

## 2020-10-07 RX ORDER — HYDROCODONE BITARTRATE AND ACETAMINOPHEN 7.5; 325 MG/1; MG/1
1 TABLET ORAL EVERY 6 HOURS PRN
Qty: 21 TABLET | Refills: 0 | OUTPATIENT
Start: 2020-10-07

## 2020-10-07 NOTE — TELEPHONE ENCOUNTER
Caller: LuciaMagalie    Relationship: Self    Best call back number: [phone]    Medication needed:   Requested Prescriptions     Pending Prescriptions Disp Refills   • HYDROcodone-acetaminophen (Norco) 7.5-325 MG per tablet 21 tablet 0     Sig: Take 1 tablet by mouth Every 6 (Six) Hours As Needed for Moderate Pain .       When do you need the refill by: TODAY    What details did the patient provide when requesting the medication: PATIENT IS INQUIRING ABOUT STATUS OF REFERRAL TO [facility]. PATIENT IS ALSO REQUESTING A REFILL FOR HYDROcodone-acetaminophen (Norco) 7.5-325 MG FOR HIS BACK PAIN UNTIL HE IS ABLE TO BE SEEN BY ORTHOPEDIC SPECIALIST. PATIENT IS CURRENTLY OUT OF MEDICATION.    Does the patient have less than a 3 day supply:  [x] Yes  [] No    What is the patient's preferred pharmacy: [facility] #[id number] - [facility] [address] [facility] [phone] [facility] [phone] FX

## 2020-10-08 DIAGNOSIS — M54.50 ACUTE MIDLINE LOW BACK PAIN WITHOUT SCIATICA: ICD-10-CM

## 2020-10-08 RX ORDER — HYDROCODONE BITARTRATE AND ACETAMINOPHEN 7.5; 325 MG/1; MG/1
1 TABLET ORAL EVERY 6 HOURS PRN
Qty: 21 TABLET | Refills: 0 | Status: SHIPPED | OUTPATIENT
Start: 2020-10-08 | End: 2020-10-14 | Stop reason: SDUPTHER

## 2020-10-08 RX ORDER — CYCLOBENZAPRINE HCL 10 MG
10 TABLET ORAL 3 TIMES DAILY PRN
Qty: 30 TABLET | Refills: 0 | Status: SHIPPED | OUTPATIENT
Start: 2020-10-08 | End: 2020-10-14 | Stop reason: SDUPTHER

## 2020-10-08 NOTE — TELEPHONE ENCOUNTER
Referral has been done sarah beth called [facility] and they told her with his ins it could take a while to get an appt

## 2020-10-08 NOTE — TELEPHONE ENCOUNTER
Referral has been sent. When I called the last time they stated that it could take a bit to get in due to insurance.

## 2020-10-08 NOTE — TELEPHONE ENCOUNTER
Pt called and stated that he was up all night with sever pain in the back he wants to know if u will call in pain meds and flexreril?

## 2020-10-14 DIAGNOSIS — M54.50 ACUTE MIDLINE LOW BACK PAIN WITHOUT SCIATICA: ICD-10-CM

## 2020-10-14 RX ORDER — HYDROCODONE BITARTRATE AND ACETAMINOPHEN 7.5; 325 MG/1; MG/1
1 TABLET ORAL EVERY 6 HOURS PRN
Qty: 21 TABLET | Refills: 0 | Status: SHIPPED | OUTPATIENT
Start: 2020-10-14 | End: 2020-10-20 | Stop reason: SDUPTHER

## 2020-10-14 RX ORDER — CYCLOBENZAPRINE HCL 10 MG
10 TABLET ORAL 3 TIMES DAILY PRN
Qty: 30 TABLET | Refills: 0 | Status: SHIPPED | OUTPATIENT
Start: 2020-10-14 | End: 2020-10-20 | Stop reason: SDUPTHER

## 2020-10-14 NOTE — TELEPHONE ENCOUNTER
Caller: Lucia Magalie R    Relationship: Self    Best call back number: [phone]    Medication needed:   Requested Prescriptions     Pending Prescriptions Disp Refills   • cyclobenzaprine (FLEXERIL) 10 MG tablet 30 tablet 0     Sig: Take 1 tablet by mouth 3 (Three) Times a Day As Needed for Muscle Spasms.   • HYDROcodone-acetaminophen (Norco) 7.5-325 MG per tablet 21 tablet 0     Sig: Take 1 tablet by mouth Every 6 (Six) Hours As Needed for Moderate Pain .       When do you need the refill by: 10/14/2020    What details did the patient provide when requesting the medication:     Does the patient have less than a 3 day supply:  [x] Yes  [] No    What is the patient's preferred pharmacy: [facility] #[id number] - [facility] [address] AT [address] [facility] [phone] [facility] [phone] FX

## 2020-10-16 ENCOUNTER — OFFICE VISIT (OUTPATIENT)
Dept: FAMILY MEDICINE CLINIC | Facility: CLINIC | Age: 55
End: 2020-10-16

## 2020-10-16 VITALS
SYSTOLIC BLOOD PRESSURE: 128 MMHG | RESPIRATION RATE: 16 BRPM | HEIGHT: 68 IN | HEART RATE: 89 BPM | WEIGHT: 166 LBS | DIASTOLIC BLOOD PRESSURE: 84 MMHG | OXYGEN SATURATION: 98 % | BODY MASS INDEX: 25.16 KG/M2

## 2020-10-16 DIAGNOSIS — G89.29 CHRONIC RIGHT-SIDED LOW BACK PAIN WITH RIGHT-SIDED SCIATICA: Primary | ICD-10-CM

## 2020-10-16 DIAGNOSIS — M54.41 CHRONIC RIGHT-SIDED LOW BACK PAIN WITH RIGHT-SIDED SCIATICA: Primary | ICD-10-CM

## 2020-10-16 PROCEDURE — 99213 OFFICE O/P EST LOW 20 MIN: CPT | Performed by: FAMILY MEDICINE

## 2020-10-16 NOTE — PROGRESS NOTES
"Ashley Myers is a 55 y.o. male.     Chief Complaint   Patient presents with   • Follow-up     3 mo   GERD       History of Present Illness     he contineus to have lower back pain--agreeable to seeing dr dixon       Current Outpatient Medications:   •  acetaminophen (TYLENOL) 500 MG tablet, Take 1,000 mg by mouth., Disp: , Rfl:   •  cyclobenzaprine (FLEXERIL) 10 MG tablet, Take 1 tablet by mouth 3 (Three) Times a Day As Needed for Muscle Spasms., Disp: 30 tablet, Rfl: 0  •  Esomeprazole Magnesium 20 MG tablet delayed-release, Take 40 mg by mouth Daily., Disp: , Rfl:   •  HYDROcodone-acetaminophen (Norco) 7.5-325 MG per tablet, Take 1 tablet by mouth Every 6 (Six) Hours As Needed for Moderate Pain ., Disp: 21 tablet, Rfl: 0  No Known Allergies    Past Medical History:   Diagnosis Date   • GERD (gastroesophageal reflux disease)    • Injury of back    • Prostate CA (CMS/HCC)      Past Surgical History:   Procedure Laterality Date   • BACK SURGERY      Patient has had 2 previous back surgeries.    • PROSTATE SURGERY         Review of Systems   Constitutional: Negative.    HENT: Negative.    Eyes: Negative.    Respiratory: Negative.    Cardiovascular: Negative.    Gastrointestinal: Negative.    Endocrine: Negative.    Genitourinary: Negative.    Musculoskeletal: Positive for back pain.   Skin: Negative.    Allergic/Immunologic: Negative.    Neurological: Negative.    Hematological: Negative.    Psychiatric/Behavioral: Negative.        Objective  /84   Pulse 89   Resp 16   Ht 172.7 cm (68\")   Wt 75.3 kg (166 lb)   SpO2 98%   BMI 25.24 kg/m²   Physical Exam  Vitals signs and nursing note reviewed.   Constitutional:       Appearance: Normal appearance. He is normal weight.   HENT:      Head: Normocephalic and atraumatic.      Right Ear: Ear canal normal.      Left Ear: Ear canal normal.      Nose: Nose normal.      Mouth/Throat:      Mouth: Mucous membranes are dry.      Pharynx: Oropharynx is " clear.   Eyes:      Extraocular Movements: Extraocular movements intact.      Conjunctiva/sclera: Conjunctivae normal.      Pupils: Pupils are equal, round, and reactive to light.   Neck:      Musculoskeletal: Normal range of motion and neck supple.   Cardiovascular:      Rate and Rhythm: Normal rate and regular rhythm.      Pulses: Normal pulses.   Pulmonary:      Effort: Pulmonary effort is normal.      Breath sounds: Normal breath sounds.   Abdominal:      General: Abdomen is flat. Bowel sounds are normal.      Palpations: Abdomen is soft.   Musculoskeletal: Normal range of motion.   Skin:     General: Skin is warm.      Capillary Refill: Capillary refill takes less than 2 seconds.   Neurological:      General: No focal deficit present.      Mental Status: He is alert and oriented to person, place, and time. Mental status is at baseline.   Psychiatric:         Mood and Affect: Mood normal.         Behavior: Behavior normal.         Thought Content: Thought content normal.         Judgment: Judgment normal.         Assessment/Plan   Diagnoses and all orders for this visit:    1. Chronic right-sided low back pain with right-sided sciatica (Primary)  -     Ambulatory Referral to Pain Management                 Orders Placed This Encounter   Procedures   • Ambulatory Referral to Pain Management     Referral Priority:   Routine     Referral Type:   Pain Management     Referral Reason:   Specialty Services Required     Referred to Provider:   Wilfrido Chang DO     Requested Specialty:   Pain Medicine     Number of Visits Requested:   1       Follow up: 4 month(s)

## 2020-10-20 DIAGNOSIS — M54.50 ACUTE MIDLINE LOW BACK PAIN WITHOUT SCIATICA: ICD-10-CM

## 2020-10-20 RX ORDER — CYCLOBENZAPRINE HCL 10 MG
10 TABLET ORAL 3 TIMES DAILY PRN
Qty: 30 TABLET | Refills: 0 | Status: SHIPPED | OUTPATIENT
Start: 2020-10-20 | End: 2020-11-09 | Stop reason: SDUPTHER

## 2020-10-20 RX ORDER — HYDROCODONE BITARTRATE AND ACETAMINOPHEN 7.5; 325 MG/1; MG/1
1 TABLET ORAL EVERY 6 HOURS PRN
Qty: 21 TABLET | Refills: 0 | Status: SHIPPED | OUTPATIENT
Start: 2020-10-20 | End: 2020-10-27 | Stop reason: SDUPTHER

## 2020-10-20 NOTE — TELEPHONE ENCOUNTER
Caller: Lucia Magalie R    Relationship: Self    Best call back number: [phone]     Medication needed:   Requested Prescriptions     Pending Prescriptions Disp Refills   • HYDROcodone-acetaminophen (Norco) 7.5-325 MG per tablet 21 tablet 0     Sig: Take 1 tablet by mouth Every 6 (Six) Hours As Needed for Moderate Pain .   • cyclobenzaprine (FLEXERIL) 10 MG tablet 30 tablet 0     Sig: Take 1 tablet by mouth 3 (Three) Times a Day As Needed for Muscle Spasms.       When do you need the refill by: 10/20/2020    What details did the patient provide when requesting the medication: ALMOST OUT OF MEDICATION ONE DOSE LEFT.    Does the patient have less than a 3 day supply:  [x] Yes  [] No    What is the patient's preferred pharmacy: [facility] #[id number] - [place], [place] - [address] [facility] [phone] [facility] [phone] FX

## 2020-10-26 ENCOUNTER — TELEPHONE (OUTPATIENT)
Dept: FAMILY MEDICINE CLINIC | Facility: CLINIC | Age: 55
End: 2020-10-26

## 2020-10-26 NOTE — TELEPHONE ENCOUNTER
Needs refill on HYDROcodone-acetaminophen (Norco) 7.5-325 MG per tablet    [facility] #[id number] - [place], [place] - [address] AT [address] [facility] - [phone]  - [phone] FX        He is out

## 2020-10-27 ENCOUNTER — LAB (OUTPATIENT)
Dept: FAMILY MEDICINE CLINIC | Facility: CLINIC | Age: 55
End: 2020-10-27

## 2020-10-27 DIAGNOSIS — M54.50 ACUTE MIDLINE LOW BACK PAIN WITHOUT SCIATICA: ICD-10-CM

## 2020-10-27 DIAGNOSIS — Z00.00 WELLNESS EXAMINATION: Primary | ICD-10-CM

## 2020-10-27 RX ORDER — HYDROCODONE BITARTRATE AND ACETAMINOPHEN 7.5; 325 MG/1; MG/1
1 TABLET ORAL EVERY 6 HOURS PRN
Qty: 56 TABLET | Refills: 0 | Status: SHIPPED | OUTPATIENT
Start: 2020-10-27 | End: 2020-10-27 | Stop reason: SDUPTHER

## 2020-10-27 RX ORDER — HYDROCODONE BITARTRATE AND ACETAMINOPHEN 7.5; 325 MG/1; MG/1
1 TABLET ORAL EVERY 6 HOURS PRN
Qty: 56 TABLET | Refills: 0 | Status: SHIPPED | OUTPATIENT
Start: 2020-10-27 | End: 2020-11-09 | Stop reason: SDUPTHER

## 2020-10-27 NOTE — TELEPHONE ENCOUNTER
[facility] so I am sending this back to you for [facility]

## 2020-10-28 LAB
ALBUMIN SERPL-MCNC: 4.3 G/DL (ref 3.5–5.2)
ALBUMIN/GLOB SERPL: 1.4 G/DL
ALP SERPL-CCNC: 118 U/L (ref 39–117)
ALT SERPL-CCNC: 17 U/L (ref 1–41)
AST SERPL-CCNC: 18 U/L (ref 1–40)
BASOPHILS # BLD AUTO: 0.06 10*3/MM3 (ref 0–0.2)
BASOPHILS NFR BLD AUTO: 0.7 % (ref 0–1.5)
BILIRUB SERPL-MCNC: 0.2 MG/DL (ref 0–1.2)
BUN SERPL-MCNC: 7 MG/DL (ref 6–20)
BUN/CREAT SERPL: 9 (ref 7–25)
CALCIUM SERPL-MCNC: 9.1 MG/DL (ref 8.6–10.5)
CHLORIDE SERPL-SCNC: 100 MMOL/L (ref 98–107)
CHOLEST SERPL-MCNC: 287 MG/DL (ref 0–200)
CO2 SERPL-SCNC: 28.1 MMOL/L (ref 22–29)
CREAT SERPL-MCNC: 0.78 MG/DL (ref 0.76–1.27)
EOSINOPHIL # BLD AUTO: 0.1 10*3/MM3 (ref 0–0.4)
EOSINOPHIL NFR BLD AUTO: 1.2 % (ref 0.3–6.2)
ERYTHROCYTE [DISTWIDTH] IN BLOOD BY AUTOMATED COUNT: 13.4 % (ref 12.3–15.4)
GLOBULIN SER CALC-MCNC: 3 GM/DL
GLUCOSE SERPL-MCNC: 92 MG/DL (ref 65–99)
HCT VFR BLD AUTO: 47.2 % (ref 37.5–51)
HDLC SERPL-MCNC: 25 MG/DL (ref 40–60)
HGB BLD-MCNC: 16 G/DL (ref 13–17.7)
IMM GRANULOCYTES # BLD AUTO: 0.03 10*3/MM3 (ref 0–0.05)
IMM GRANULOCYTES NFR BLD AUTO: 0.4 % (ref 0–0.5)
LDLC SERPL CALC-MCNC: 178 MG/DL (ref 0–100)
LYMPHOCYTES # BLD AUTO: 3.3 10*3/MM3 (ref 0.7–3.1)
LYMPHOCYTES NFR BLD AUTO: 38.9 % (ref 19.6–45.3)
MCH RBC QN AUTO: 30.1 PG (ref 26.6–33)
MCHC RBC AUTO-ENTMCNC: 33.9 G/DL (ref 31.5–35.7)
MCV RBC AUTO: 88.7 FL (ref 79–97)
MONOCYTES # BLD AUTO: 0.64 10*3/MM3 (ref 0.1–0.9)
MONOCYTES NFR BLD AUTO: 7.5 % (ref 5–12)
NEUTROPHILS # BLD AUTO: 4.35 10*3/MM3 (ref 1.7–7)
NEUTROPHILS NFR BLD AUTO: 51.3 % (ref 42.7–76)
NRBC BLD AUTO-RTO: 0.1 /100 WBC (ref 0–0.2)
PLATELET # BLD AUTO: 247 10*3/MM3 (ref 140–450)
POTASSIUM SERPL-SCNC: 4.3 MMOL/L (ref 3.5–5.2)
PROT SERPL-MCNC: 7.3 G/DL (ref 6–8.5)
RBC # BLD AUTO: 5.32 10*6/MM3 (ref 4.14–5.8)
SODIUM SERPL-SCNC: 138 MMOL/L (ref 136–145)
TRIGL SERPL-MCNC: 420 MG/DL (ref 0–150)
VLDLC SERPL CALC-MCNC: 84 MG/DL (ref 5–40)
WBC # BLD AUTO: 8.48 10*3/MM3 (ref 3.4–10.8)

## 2020-10-29 RX ORDER — LOVASTATIN 40 MG/1
40 TABLET ORAL NIGHTLY
Qty: 30 TABLET | Refills: 3 | Status: SHIPPED | OUTPATIENT
Start: 2020-10-29 | End: 2020-11-09 | Stop reason: SDUPTHER

## 2020-10-29 RX ORDER — LOVASTATIN 40 MG/1
40 TABLET ORAL NIGHTLY
Qty: 30 TABLET | Refills: 3 | Status: SHIPPED | OUTPATIENT
Start: 2020-10-29 | End: 2020-10-29 | Stop reason: SDUPTHER

## 2020-11-09 DIAGNOSIS — M54.50 ACUTE MIDLINE LOW BACK PAIN WITHOUT SCIATICA: ICD-10-CM

## 2020-11-09 RX ORDER — LOVASTATIN 40 MG/1
40 TABLET ORAL NIGHTLY
Qty: 30 TABLET | Refills: 3 | Status: SHIPPED | OUTPATIENT
Start: 2020-11-09 | End: 2021-08-02

## 2020-11-09 RX ORDER — CYCLOBENZAPRINE HCL 10 MG
10 TABLET ORAL 3 TIMES DAILY PRN
Qty: 30 TABLET | Refills: 0 | Status: SHIPPED | OUTPATIENT
Start: 2020-11-09 | End: 2020-12-03 | Stop reason: SDUPTHER

## 2020-11-09 RX ORDER — HYDROCODONE BITARTRATE AND ACETAMINOPHEN 7.5; 325 MG/1; MG/1
1 TABLET ORAL EVERY 6 HOURS PRN
Qty: 56 TABLET | Refills: 0 | Status: SHIPPED | OUTPATIENT
Start: 2020-11-09 | End: 2020-11-20 | Stop reason: SDUPTHER

## 2020-11-09 NOTE — TELEPHONE ENCOUNTER
Requested Prescriptions     Pending Prescriptions Disp Refills   • cyclobenzaprine (FLEXERIL) 10 MG tablet 30 tablet 0     Sig: Take 1 tablet by mouth 3 (Three) Times a Day As Needed for Muscle Spasms.   • HYDROcodone-acetaminophen (Norco) 7.5-325 MG per tablet 56 tablet 0     Sig: Take 1 tablet by mouth Every 6 (Six) Hours As Needed for Moderate Pain .   • lovastatin (Mevacor) 40 MG tablet 30 tablet 3     Sig: Take 1 tablet by mouth Every Night.

## 2020-11-09 NOTE — TELEPHONE ENCOUNTER
Caller: Lucia Magalie R    Relationship: Self    Best call back number: [phone]    Medication needed:   Requested Prescriptions     Pending Prescriptions Disp Refills   • cyclobenzaprine (FLEXERIL) 10 MG tablet 30 tablet 0     Sig: Take 1 tablet by mouth 3 (Three) Times a Day As Needed for Muscle Spasms.   • HYDROcodone-acetaminophen (Norco) 7.5-325 MG per tablet 56 tablet 0     Sig: Take 1 tablet by mouth Every 6 (Six) Hours As Needed for Moderate Pain .   • lovastatin (Mevacor) 40 MG tablet 30 tablet 3     Sig: Take 1 tablet by mouth Every Night.     PATIENT ADVISED THAT ALL MEDS NEED TO GO TO [facility] FROM NOW ON    What is the patient's preferred pharmacy: [facility] #[id number] [place] [address] AT [address] [facility] [phone] [facility] [phone] FX

## 2020-11-20 DIAGNOSIS — M54.50 ACUTE MIDLINE LOW BACK PAIN WITHOUT SCIATICA: ICD-10-CM

## 2020-11-20 RX ORDER — HYDROCODONE BITARTRATE AND ACETAMINOPHEN 7.5; 325 MG/1; MG/1
1 TABLET ORAL EVERY 6 HOURS PRN
Qty: 56 TABLET | Refills: 0 | Status: SHIPPED | OUTPATIENT
Start: 2020-11-20 | End: 2020-11-20 | Stop reason: SDUPTHER

## 2020-11-20 RX ORDER — HYDROCODONE BITARTRATE AND ACETAMINOPHEN 7.5; 325 MG/1; MG/1
1 TABLET ORAL EVERY 6 HOURS PRN
Qty: 56 TABLET | Refills: 0 | Status: SHIPPED | OUTPATIENT
Start: 2020-11-20 | End: 2020-12-03 | Stop reason: SDUPTHER

## 2020-11-20 RX ORDER — CYCLOBENZAPRINE HCL 10 MG
10 TABLET ORAL 3 TIMES DAILY PRN
Qty: 30 TABLET | Refills: 0 | OUTPATIENT
Start: 2020-11-20

## 2020-11-20 RX ORDER — HYDROCODONE BITARTRATE AND ACETAMINOPHEN 7.5; 325 MG/1; MG/1
1 TABLET ORAL EVERY 6 HOURS PRN
Qty: 56 TABLET | Refills: 0 | OUTPATIENT
Start: 2020-11-20

## 2020-11-20 NOTE — TELEPHONE ENCOUNTER
Caller: Lucia Magalie R    Relationship: Self    Best call back number: [phone]     Medication needed:   Requested Prescriptions     Pending Prescriptions Disp Refills   • HYDROcodone-acetaminophen (Norco) 7.5-325 MG per tablet 56 tablet 0     Sig: Take 1 tablet by mouth Every 6 (Six) Hours As Needed for Moderate Pain .   • cyclobenzaprine (FLEXERIL) 10 MG tablet 30 tablet 0     Sig: Take 1 tablet by mouth 3 (Three) Times a Day As Needed for Muscle Spasms.       When do you need the refill by: ASAP    What details did the patient provide when requesting the medication: PATIENT HAS ONE DAY LEFT. PLEASE LET PATIENT KNOW WHEN MEDICATION IS CALLED IN.      Does the patient have less than a 3 day supply:  [] Yes  [] No    What is the patient's preferred pharmacy: [facility] #[id number] [address] [phone] [facility] [phone] FX

## 2020-12-03 DIAGNOSIS — M54.50 ACUTE MIDLINE LOW BACK PAIN WITHOUT SCIATICA: ICD-10-CM

## 2020-12-03 RX ORDER — CYCLOBENZAPRINE HCL 10 MG
10 TABLET ORAL NIGHTLY PRN
Qty: 30 TABLET | Refills: 0 | Status: SHIPPED | OUTPATIENT
Start: 2020-12-03 | End: 2020-12-28 | Stop reason: SDUPTHER

## 2020-12-03 RX ORDER — HYDROCODONE BITARTRATE AND ACETAMINOPHEN 7.5; 325 MG/1; MG/1
1 TABLET ORAL EVERY 6 HOURS PRN
Qty: 120 TABLET | Refills: 0 | Status: SHIPPED | OUTPATIENT
Start: 2020-12-03 | End: 2020-12-28 | Stop reason: SDUPTHER

## 2020-12-28 DIAGNOSIS — M54.50 ACUTE MIDLINE LOW BACK PAIN WITHOUT SCIATICA: ICD-10-CM

## 2020-12-28 RX ORDER — HYDROCODONE BITARTRATE AND ACETAMINOPHEN 7.5; 325 MG/1; MG/1
1 TABLET ORAL EVERY 6 HOURS PRN
Qty: 120 TABLET | Refills: 0 | Status: SHIPPED | OUTPATIENT
Start: 2020-12-28 | End: 2021-01-29 | Stop reason: SDUPTHER

## 2020-12-28 RX ORDER — CYCLOBENZAPRINE HCL 10 MG
10 TABLET ORAL NIGHTLY PRN
Qty: 30 TABLET | Refills: 0 | Status: SHIPPED | OUTPATIENT
Start: 2020-12-28 | End: 2021-08-02

## 2020-12-28 NOTE — TELEPHONE ENCOUNTER
Kaden says that [facility] in [place], [place] is waiting on his insurance for coverage.  He says that he is waiting on [facility] to call and give him appt time and date.  He is req flexeril and norco to be refilled

## 2020-12-28 NOTE — TELEPHONE ENCOUNTER
I notified Kaden that this would be the LAST time he would be getting a refill of norco & flexeril.  He verbalized understanding and said that he would be on the phone to his insurance company to find out about pain mgt.

## 2021-01-28 DIAGNOSIS — M54.50 ACUTE MIDLINE LOW BACK PAIN WITHOUT SCIATICA: ICD-10-CM

## 2021-01-29 DIAGNOSIS — M54.50 ACUTE MIDLINE LOW BACK PAIN WITHOUT SCIATICA: ICD-10-CM

## 2021-01-29 RX ORDER — HYDROCODONE BITARTRATE AND ACETAMINOPHEN 7.5; 325 MG/1; MG/1
1 TABLET ORAL EVERY 6 HOURS PRN
Qty: 28 TABLET | Refills: 0 | Status: SHIPPED | OUTPATIENT
Start: 2021-01-29 | End: 2021-02-26 | Stop reason: SDUPTHER

## 2021-01-29 RX ORDER — HYDROCODONE BITARTRATE AND ACETAMINOPHEN 7.5; 325 MG/1; MG/1
1 TABLET ORAL EVERY 6 HOURS PRN
Qty: 120 TABLET | Refills: 0 | OUTPATIENT
Start: 2021-01-29

## 2021-01-29 NOTE — TELEPHONE ENCOUNTER
Vasile,  Do you have any information on the referral for Kaden to pain mgt.  He says that he was supposed to go to somewhere in [place], then they would not take his insurance and he was referred to [place] pain mgt; now he says that he was told that the dr in [place] will take his insurance and he is supposed to go there.?  I am so confused and he is asking for a refill of pain med until he has an appt w/ pain mgt.   Help please

## 2021-01-29 NOTE — TELEPHONE ENCOUNTER
Kaden is expecting a call back today regarding his pain med.  Do you want me to send 1 week worth of med and see what Dr. Chang's office says on Monday.  sarah beth has left a message with zack's office on his referral.

## 2021-02-26 ENCOUNTER — TELEPHONE (OUTPATIENT)
Dept: FAMILY MEDICINE CLINIC | Facility: CLINIC | Age: 56
End: 2021-02-26

## 2021-02-26 DIAGNOSIS — M54.50 ACUTE MIDLINE LOW BACK PAIN WITHOUT SCIATICA: ICD-10-CM

## 2021-02-26 RX ORDER — HYDROCODONE BITARTRATE AND ACETAMINOPHEN 7.5; 325 MG/1; MG/1
1 TABLET ORAL EVERY 6 HOURS PRN
Qty: 28 TABLET | Refills: 0 | Status: SHIPPED | OUTPATIENT
Start: 2021-02-26 | End: 2021-03-09 | Stop reason: SDUPTHER

## 2021-02-26 NOTE — TELEPHONE ENCOUNTER
Notified pt that Vasile was able to get in contact w/ [facility] in [place] & they assured her taht they would call & get Kaden in for an appt w/ them in the next to weeks.   Also notified himthat norco sent to [facility] for 7 days as requested

## 2021-02-26 NOTE — TELEPHONE ENCOUNTER
Contacted [place] office, the 3rd office anibal sent his pain management referral to and they are unable to accept patient's insurance. I faxed and spoke to someone at [facility]. They stated that they had received referral and that he would be scheduled within 2 weeks.

## 2021-02-26 NOTE — TELEPHONE ENCOUNTER
Kaden called & said that he has been waiting on an appt w/ pain mgt and they are not responding.  He says that he has been out of pain meds for 2 weeks.  He is asking for a refill of pain med sent to [facility]

## 2021-03-05 ENCOUNTER — TELEPHONE (OUTPATIENT)
Dept: FAMILY MEDICINE CLINIC | Facility: CLINIC | Age: 56
End: 2021-03-05

## 2021-03-08 ENCOUNTER — ANESTHESIA (OUTPATIENT)
Dept: PERIOP | Facility: HOSPITAL | Age: 56
End: 2021-03-08

## 2021-03-08 ENCOUNTER — ANESTHESIA EVENT (OUTPATIENT)
Dept: PERIOP | Facility: HOSPITAL | Age: 56
End: 2021-03-08

## 2021-03-08 ENCOUNTER — HOSPITAL ENCOUNTER (EMERGENCY)
Facility: HOSPITAL | Age: 56
Discharge: HOME | End: 2021-03-08
Attending: EMERGENCY MEDICINE | Admitting: INTERNAL MEDICINE

## 2021-03-08 VITALS
RESPIRATION RATE: 15 BRPM | BODY MASS INDEX: 24.25 KG/M2 | WEIGHT: 160 LBS | SYSTOLIC BLOOD PRESSURE: 120 MMHG | HEART RATE: 65 BPM | TEMPERATURE: 98 F | DIASTOLIC BLOOD PRESSURE: 85 MMHG | OXYGEN SATURATION: 96 % | HEIGHT: 68 IN

## 2021-03-08 DIAGNOSIS — K22.2 ESOPHAGEAL OBSTRUCTION DUE TO FOOD IMPACTION: Primary | ICD-10-CM

## 2021-03-08 DIAGNOSIS — T18.128A ESOPHAGEAL OBSTRUCTION DUE TO FOOD IMPACTION: Primary | ICD-10-CM

## 2021-03-08 LAB
ANION GAP SERPL CALCULATED.3IONS-SCNC: 10 MMOL/L (ref 5–15)
BASOPHILS # BLD AUTO: 0.07 10*3/MM3 (ref 0–0.2)
BASOPHILS NFR BLD AUTO: 0.6 % (ref 0–1.5)
BUN SERPL-MCNC: 4 MG/DL (ref 6–20)
BUN/CREAT SERPL: 5.8 (ref 7–25)
CALCIUM SPEC-SCNC: 9.1 MG/DL (ref 8.6–10.5)
CHLORIDE SERPL-SCNC: 97 MMOL/L (ref 98–107)
CO2 SERPL-SCNC: 27 MMOL/L (ref 22–29)
CREAT SERPL-MCNC: 0.69 MG/DL (ref 0.76–1.27)
DEPRECATED RDW RBC AUTO: 38.9 FL (ref 37–54)
EOSINOPHIL # BLD AUTO: 0.1 10*3/MM3 (ref 0–0.4)
EOSINOPHIL NFR BLD AUTO: 0.8 % (ref 0.3–6.2)
ERYTHROCYTE [DISTWIDTH] IN BLOOD BY AUTOMATED COUNT: 12.6 % (ref 12.3–15.4)
GFR SERPL CREATININE-BSD FRML MDRD: 119 ML/MIN/1.73
GLUCOSE SERPL-MCNC: 106 MG/DL (ref 65–99)
HCT VFR BLD AUTO: 40.8 % (ref 37.5–51)
HGB BLD-MCNC: 14.5 G/DL (ref 13–17.7)
IMM GRANULOCYTES # BLD AUTO: 0.04 10*3/MM3 (ref 0–0.05)
IMM GRANULOCYTES NFR BLD AUTO: 0.3 % (ref 0–0.5)
LYMPHOCYTES # BLD AUTO: 3.69 10*3/MM3 (ref 0.7–3.1)
LYMPHOCYTES NFR BLD AUTO: 31.1 % (ref 19.6–45.3)
MCH RBC QN AUTO: 30.1 PG (ref 26.6–33)
MCHC RBC AUTO-ENTMCNC: 35.5 G/DL (ref 31.5–35.7)
MCV RBC AUTO: 84.6 FL (ref 79–97)
MONOCYTES # BLD AUTO: 0.85 10*3/MM3 (ref 0.1–0.9)
MONOCYTES NFR BLD AUTO: 7.2 % (ref 5–12)
NEUTROPHILS NFR BLD AUTO: 60 % (ref 42.7–76)
NEUTROPHILS NFR BLD AUTO: 7.13 10*3/MM3 (ref 1.7–7)
NRBC BLD AUTO-RTO: 0 /100 WBC (ref 0–0.2)
PLATELET # BLD AUTO: 208 10*3/MM3 (ref 140–450)
PMV BLD AUTO: 10.2 FL (ref 6–12)
POTASSIUM SERPL-SCNC: 3.3 MMOL/L (ref 3.5–5.2)
RBC # BLD AUTO: 4.82 10*6/MM3 (ref 4.14–5.8)
SARS-COV-2 RNA PNL SPEC NAA+PROBE: NOT DETECTED
SODIUM SERPL-SCNC: 134 MMOL/L (ref 136–145)
WBC # BLD AUTO: 11.88 10*3/MM3 (ref 3.4–10.8)

## 2021-03-08 PROCEDURE — 87635 SARS-COV-2 COVID-19 AMP PRB: CPT | Performed by: EMERGENCY MEDICINE

## 2021-03-08 PROCEDURE — 96374 THER/PROPH/DIAG INJ IV PUSH: CPT

## 2021-03-08 PROCEDURE — 25010000002 ONDANSETRON PER 1 MG: Performed by: NURSE ANESTHETIST, CERTIFIED REGISTERED

## 2021-03-08 PROCEDURE — 99283 EMERGENCY DEPT VISIT LOW MDM: CPT

## 2021-03-08 PROCEDURE — 96375 TX/PRO/DX INJ NEW DRUG ADDON: CPT

## 2021-03-08 PROCEDURE — 43194 ESOPHAGOSCP RIG TRNSO REM FB: CPT | Performed by: INTERNAL MEDICINE

## 2021-03-08 PROCEDURE — C9803 HOPD COVID-19 SPEC COLLECT: HCPCS | Performed by: EMERGENCY MEDICINE

## 2021-03-08 PROCEDURE — 25010000002 PROPOFOL 10 MG/ML EMULSION: Performed by: NURSE ANESTHETIST, CERTIFIED REGISTERED

## 2021-03-08 PROCEDURE — 80048 BASIC METABOLIC PNL TOTAL CA: CPT | Performed by: EMERGENCY MEDICINE

## 2021-03-08 PROCEDURE — 25010000002 DEXAMETHASONE PER 1 MG: Performed by: NURSE ANESTHETIST, CERTIFIED REGISTERED

## 2021-03-08 PROCEDURE — 25010000002 SUCCINYLCHOLINE PER 20 MG: Performed by: NURSE ANESTHETIST, CERTIFIED REGISTERED

## 2021-03-08 PROCEDURE — 99283 EMERGENCY DEPT VISIT LOW MDM: CPT | Performed by: INTERNAL MEDICINE

## 2021-03-08 PROCEDURE — 85025 COMPLETE CBC W/AUTO DIFF WBC: CPT | Performed by: EMERGENCY MEDICINE

## 2021-03-08 PROCEDURE — 25010000002 ONDANSETRON PER 1 MG: Performed by: EMERGENCY MEDICINE

## 2021-03-08 RX ORDER — DEXAMETHASONE SODIUM PHOSPHATE 4 MG/ML
INJECTION, SOLUTION INTRA-ARTICULAR; INTRALESIONAL; INTRAMUSCULAR; INTRAVENOUS; SOFT TISSUE AS NEEDED
Status: DISCONTINUED | OUTPATIENT
Start: 2021-03-08 | End: 2021-03-08 | Stop reason: SURG

## 2021-03-08 RX ORDER — FENTANYL CITRATE 50 UG/ML
25 INJECTION, SOLUTION INTRAMUSCULAR; INTRAVENOUS
Status: DISCONTINUED | OUTPATIENT
Start: 2021-03-08 | End: 2021-03-09 | Stop reason: HOSPADM

## 2021-03-08 RX ORDER — LIDOCAINE HYDROCHLORIDE 20 MG/ML
INJECTION, SOLUTION EPIDURAL; INFILTRATION; INTRACAUDAL; PERINEURAL AS NEEDED
Status: DISCONTINUED | OUTPATIENT
Start: 2021-03-08 | End: 2021-03-08 | Stop reason: SURG

## 2021-03-08 RX ORDER — FAMOTIDINE 10 MG/ML
20 INJECTION, SOLUTION INTRAVENOUS ONCE
Status: COMPLETED | OUTPATIENT
Start: 2021-03-08 | End: 2021-03-08

## 2021-03-08 RX ORDER — ONDANSETRON 2 MG/ML
INJECTION INTRAMUSCULAR; INTRAVENOUS AS NEEDED
Status: DISCONTINUED | OUTPATIENT
Start: 2021-03-08 | End: 2021-03-08 | Stop reason: SURG

## 2021-03-08 RX ORDER — FLUMAZENIL 0.1 MG/ML
0.2 INJECTION INTRAVENOUS AS NEEDED
Status: DISCONTINUED | OUTPATIENT
Start: 2021-03-08 | End: 2021-03-09 | Stop reason: HOSPADM

## 2021-03-08 RX ORDER — ONDANSETRON 2 MG/ML
4 INJECTION INTRAMUSCULAR; INTRAVENOUS ONCE AS NEEDED
Status: COMPLETED | OUTPATIENT
Start: 2021-03-08 | End: 2021-03-08

## 2021-03-08 RX ORDER — NALOXONE HCL 0.4 MG/ML
0.4 VIAL (ML) INJECTION AS NEEDED
Status: DISCONTINUED | OUTPATIENT
Start: 2021-03-08 | End: 2021-03-09 | Stop reason: HOSPADM

## 2021-03-08 RX ORDER — ROCURONIUM BROMIDE 10 MG/ML
INJECTION, SOLUTION INTRAVENOUS AS NEEDED
Status: DISCONTINUED | OUTPATIENT
Start: 2021-03-08 | End: 2021-03-08 | Stop reason: SURG

## 2021-03-08 RX ORDER — LABETALOL HYDROCHLORIDE 5 MG/ML
5 INJECTION, SOLUTION INTRAVENOUS
Status: DISCONTINUED | OUTPATIENT
Start: 2021-03-08 | End: 2021-03-09 | Stop reason: HOSPADM

## 2021-03-08 RX ORDER — SODIUM CHLORIDE, SODIUM LACTATE, POTASSIUM CHLORIDE, CALCIUM CHLORIDE 600; 310; 30; 20 MG/100ML; MG/100ML; MG/100ML; MG/100ML
INJECTION, SOLUTION INTRAVENOUS CONTINUOUS PRN
Status: DISCONTINUED | OUTPATIENT
Start: 2021-03-08 | End: 2021-03-08 | Stop reason: SURG

## 2021-03-08 RX ORDER — IBUPROFEN 600 MG/1
600 TABLET ORAL ONCE AS NEEDED
Status: DISCONTINUED | OUTPATIENT
Start: 2021-03-08 | End: 2021-03-09 | Stop reason: HOSPADM

## 2021-03-08 RX ORDER — PROPOFOL 10 MG/ML
VIAL (ML) INTRAVENOUS AS NEEDED
Status: DISCONTINUED | OUTPATIENT
Start: 2021-03-08 | End: 2021-03-08 | Stop reason: SURG

## 2021-03-08 RX ORDER — OXYCODONE AND ACETAMINOPHEN 10; 325 MG/1; MG/1
1 TABLET ORAL ONCE AS NEEDED
Status: DISCONTINUED | OUTPATIENT
Start: 2021-03-08 | End: 2021-03-09 | Stop reason: HOSPADM

## 2021-03-08 RX ORDER — OXYCODONE AND ACETAMINOPHEN 7.5; 325 MG/1; MG/1
2 TABLET ORAL EVERY 4 HOURS PRN
Status: DISCONTINUED | OUTPATIENT
Start: 2021-03-08 | End: 2021-03-09 | Stop reason: HOSPADM

## 2021-03-08 RX ORDER — ONDANSETRON 2 MG/ML
4 INJECTION INTRAMUSCULAR; INTRAVENOUS ONCE
Status: COMPLETED | OUTPATIENT
Start: 2021-03-08 | End: 2021-03-08

## 2021-03-08 RX ORDER — SUCCINYLCHOLINE CHLORIDE 20 MG/ML
INJECTION INTRAMUSCULAR; INTRAVENOUS AS NEEDED
Status: DISCONTINUED | OUTPATIENT
Start: 2021-03-08 | End: 2021-03-08 | Stop reason: SURG

## 2021-03-08 RX ADMIN — ROCURONIUM BROMIDE 5 MG: 10 INJECTION INTRAVENOUS at 21:25

## 2021-03-08 RX ADMIN — LIDOCAINE HYDROCHLORIDE 100 MG: 20 INJECTION, SOLUTION EPIDURAL; INFILTRATION; INTRACAUDAL; PERINEURAL at 21:25

## 2021-03-08 RX ADMIN — SODIUM CHLORIDE, POTASSIUM CHLORIDE, SODIUM LACTATE AND CALCIUM CHLORIDE: 600; 310; 30; 20 INJECTION, SOLUTION INTRAVENOUS at 21:36

## 2021-03-08 RX ADMIN — FAMOTIDINE 20 MG: 10 INJECTION INTRAVENOUS at 19:57

## 2021-03-08 RX ADMIN — ONDANSETRON HYDROCHLORIDE 4 MG: 2 SOLUTION INTRAMUSCULAR; INTRAVENOUS at 19:57

## 2021-03-08 RX ADMIN — DEXAMETHASONE SODIUM PHOSPHATE 4 MG: 4 INJECTION, SOLUTION INTRAMUSCULAR; INTRAVENOUS at 21:35

## 2021-03-08 RX ADMIN — ONDANSETRON HYDROCHLORIDE 4 MG: 2 SOLUTION INTRAMUSCULAR; INTRAVENOUS at 21:51

## 2021-03-08 RX ADMIN — SODIUM CHLORIDE, POTASSIUM CHLORIDE, SODIUM LACTATE AND CALCIUM CHLORIDE: 600; 310; 30; 20 INJECTION, SOLUTION INTRAVENOUS at 21:25

## 2021-03-08 RX ADMIN — SUCCINYLCHOLINE CHLORIDE 140 MG: 20 INJECTION, SOLUTION INTRAMUSCULAR; INTRAVENOUS at 21:25

## 2021-03-08 RX ADMIN — PROPOFOL 200 MG: 10 INJECTION, EMULSION INTRAVENOUS at 21:25

## 2021-03-08 RX ADMIN — ONDANSETRON HYDROCHLORIDE 4 MG: 2 SOLUTION INTRAMUSCULAR; INTRAVENOUS at 21:35

## 2021-03-09 DIAGNOSIS — M54.50 ACUTE MIDLINE LOW BACK PAIN WITHOUT SCIATICA: ICD-10-CM

## 2021-03-09 RX ORDER — HYDROCODONE BITARTRATE AND ACETAMINOPHEN 7.5; 325 MG/1; MG/1
1 TABLET ORAL EVERY 6 HOURS PRN
Qty: 68 TABLET | Refills: 0 | Status: SHIPPED | OUTPATIENT
Start: 2021-03-09

## 2021-03-09 NOTE — DISCHARGE INSTRUCTIONS
Patient is to call and make an appointment with [facility] at [phone]     Patient is to maintain a full liquid diet until seen in the office in two weeks.

## 2021-03-09 NOTE — TELEPHONE ENCOUNTER
Pt came by the office and stated that he went to dr bautista office and they told him they would not accept his ins so he could not be seen so he now has an appt with [facility] pain [facility] on 03/27/2021 so he asked if u will call in enough pain meds till then

## 2021-03-09 NOTE — CONSULTS
Inpatient Gastroenterology Service    Consultation    Indra Shabazz MD, FACP        Patient referred for evaluation of acute complete dysphagia  History is obtained from the patient, the EMR ,the referring physician, and appears reliable        Chief complaint  -acute compete dysphagia        History of Present Illness  -patient eating steak and developed acute complete dysphagia.  No airway compromise.  (+)regurgitation of saliva.    -denies hematemesis, hematochezia, melena, diarrhea, constipation, pyrosis, odynophagia, altered bowel habit, change in stool, choluria, acholic stool, or jaundice        Past Medical History  -GERD with esophageal stricture        Past Surgical History  -back  -prostate        Past Endoscopy History  - EGD with dilation related to food impaction ~5 years ago        Social History  -tobacco use: (+)   -ethanol use: denies        Medications  -see EMR lists        Physical Exam  General  -appears stated age  -no acute distress  -no outstanding physical abnormality    HEENT  -normocephalic  -atraumatic  -no drainage  -no bleeding  -mucosa appears moist     Neurological  -alert  -oriented  -normal speech  -no slowed response  -no tremor  -no obvious focal deficit  -moves all extremities without obvious abnormality    Psychiatric  -normal mood  -normal responses to questions  -no unusual behaviors noted    Skin  -dry  -no diaphoresis  -no icterus  -no rash (evaluation limited to exposed skin)    Neck  -supple  -normal passive ROM  -no JVD    Pulmonary  -normal respiratory effort  -no respiratory distress  -no stridor    Cardiovascular  -normal rate  -regular rhythm    Abdomen  -benign  -soft  -bowel sounds present all quadrants  -no tenderness to direct exam        Laboratory of Note  -CBC and BMP acceptable        Assessment  -food bolus impaction of the esophagus, recurrent          Recommendations  -urgent EGD  -acid suppression  -elective EGD post-convalescence for esophageal  dilation  -clear liquids x 24 hours post EGD, then soft mechanical diet until esophageal dilation can be accomplished  -please see endoscopy report to follow for findings and further recommendations        Thank you for allowing us to participate in the care of this patient.    Sincerely,    MYA Shabazz MD, FACP

## 2021-03-09 NOTE — ED PROVIDER NOTES
Subjective   56 y/o male with history of esophogeal strictures in the past and food bolus in the past that needed EGD around 5 years ago arrives for evaluation of steak stuck in his chest that he ingested around 1.5 hours ago. He states he can swallow but then has to vomit up the saliva. He cannot swallow any food or drink. He arrives in NAD.           Review of Systems   All other systems reviewed and are negative.      Past Medical History:   Diagnosis Date   • GERD (gastroesophageal reflux disease)    • Injury of back    • Prostate CA (CMS/HCC)        No Known Allergies    Past Surgical History:   Procedure Laterality Date   • BACK SURGERY      Patient has had 2 previous back surgeries.    • PROSTATE SURGERY         Family History   Problem Relation Age of Onset   • Arthritis Mother    • Heart disease Father    • Cancer Father    • No Known Problems Sister    • No Known Problems Brother    • No Known Problems Brother        Social History     Socioeconomic History   • Marital status:      Spouse name: Not on file   • Number of children: Not on file   • Years of education: Not on file   • Highest education level: Not on file   Tobacco Use   • Smoking status: Current Every Day Smoker     Packs/day: 1.00     Types: Cigarettes   • Smokeless tobacco: Never Used   Substance and Sexual Activity   • Alcohol use: No   • Drug use: No   • Sexual activity: Defer           Objective   Physical Exam  Vitals and nursing note reviewed.   Constitutional:       Appearance: Normal appearance. He is normal weight.   HENT:      Head: Normocephalic and atraumatic.      Right Ear: External ear normal.      Left Ear: External ear normal.      Nose: Nose normal.      Mouth/Throat:      Mouth: Mucous membranes are moist.      Pharynx: Oropharynx is clear.   Eyes:      Pupils: Pupils are equal, round, and reactive to light.   Cardiovascular:      Rate and Rhythm: Normal rate and regular rhythm.      Pulses: Normal pulses.      Heart  sounds: Normal heart sounds.   Pulmonary:      Effort: Pulmonary effort is normal.      Breath sounds: Normal breath sounds.   Musculoskeletal:         General: No swelling or tenderness. Normal range of motion.      Cervical back: Normal range of motion.   Skin:     General: Skin is warm.      Capillary Refill: Capillary refill takes less than 2 seconds.   Neurological:      General: No focal deficit present.      Mental Status: He is alert and oriented to person, place, and time.   Psychiatric:         Mood and Affect: Mood normal.         Behavior: Behavior normal.         Thought Content: Thought content normal.         Procedures           ED Course  ED Course as of Mar 08 2006   Mon Mar 08, 2021   2005 DR. De Jesus will take to [employer]. But wishes for BMP or CBC    []      ED Course User Index  [] Luis Miguel Wagner MD           No orders to display     Labs Reviewed   COVID PRE-OP / PRE-PROCEDURE SCREENING ORDER (NO ISOLATION)    Narrative:     The following orders were created for panel order COVID PRE-OP / PRE-PROCEDURE SCREENING ORDER (NO ISOLATION) - Swab, Nasopharynx.  Procedure                               Abnormality         Status                     ---------                               -----------         ------                     COVID-19,[facility] IN-SENDY...[[id number]]                                                   Please view results for these tests on the individual orders.   COVID-19,[facility] IN-HOUSE,NASAL SWAB NO TRANSPORT MEDIA 2 HR TAT   BASIC METABOLIC PANEL   CBC WITH AUTO DIFFERENTIAL   CBC AND DIFFERENTIAL    Narrative:     The following orders were created for panel order CBC & Differential.  Procedure                               Abnormality         Status                     ---------                               -----------         ------                     CBC Auto Differential[[id number]]                                                         Please view results  for these tests on the individual orders.                                     [facility]    Final diagnoses:   Esophageal obstruction due to food impaction            Luis Miguel Wagner MD  03/08/21 2006

## 2021-03-09 NOTE — ANESTHESIA POSTPROCEDURE EVALUATION
"Patient: Magalie Myers    Procedure Summary     Date: 03/08/21 Room / Location:  [facility] OR 02 /  [facility] OR    Anesthesia Start: 2121 Anesthesia Stop: 2145    Procedure: ESOPHAGOGASTRODUODENOSCOPY WITH ANESTHESIA (N/A ) Diagnosis:     Surgeons: Indra Shabazz MD Provider: Frankie Nelson CRNA    Anesthesia Type: general ASA Status: 2 - Emergent          Anesthesia Type: general    Vitals  No vitals data found for the desired time range.          Post Anesthesia Care and Evaluation    Patient location during evaluation: PACU  Patient participation: complete - patient participated  Level of consciousness: awake and alert  Pain management: adequate  Airway patency: patent  Anesthetic complications: No anesthetic complications    Cardiovascular status: acceptable  Respiratory status: acceptable  Hydration status: acceptable    Comments: Blood pressure 179/96, pulse 91, temperature 98 °F (36.7 °C), resp. rate 16, height 172.7 cm (68\"), weight 72.6 kg (160 lb), SpO2 99 %.    Pt discharged from PACU based on mercedez score >8      "

## 2021-03-09 NOTE — ANESTHESIA PREPROCEDURE EVALUATION
Anesthesia Evaluation     Patient summary reviewed and Nursing notes reviewed   NPO Solid Status: > 6 hours  NPO Liquid Status: > 8 hours           Airway   Mallampati: II  TM distance: >3 FB  No difficulty expected  Dental - normal exam     Pulmonary - normal exam   (+) a smoker Current Abstained day of surgery,   Cardiovascular   Exercise tolerance: good (4-7 METS)    Rhythm: regular  Rate: normal        Neuro/Psych  GI/Hepatic/Renal/Endo    (+)  GERD,      Musculoskeletal     Abdominal    Substance History      OB/GYN negative ob/gyn ROS         Other   arthritis,    history of cancer                    Anesthesia Plan    ASA 2 - emergent     general     intravenous induction     Anesthetic plan, all risks, benefits, and alternatives have been provided, discussed and informed consent has been obtained with: patient.

## 2021-03-09 NOTE — ANESTHESIA PROCEDURE NOTES
Airway  Urgency: elective    Date/Time: 3/8/2021 9:25 PM    General Information and Staff    Patient location during procedure: OR  CRNA: Frankie Nelson CRNA    Indications and Patient Condition  Indications for airway management: airway protection    Preoxygenated: yes  MILS maintained throughout  Mask difficulty assessment: 1 - vent by mask    Final Airway Details  Final airway type: endotracheal airway      Successful airway: ETT  Cuffed: yes   Successful intubation technique: direct laryngoscopy  Endotracheal tube insertion site: oral  Blade: Juarez  Blade size: 2  ETT size (mm): 7.5  Cormack-Lehane Classification: grade I - full view of glottis  Placement verified by: chest auscultation and capnometry   Cuff volume (mL): 5  Measured from: lips  ETT/EBT  to lips (cm): 21  Number of attempts at approach: 1  Assessment: lips, teeth, and gum same as pre-op and atraumatic intubation

## 2021-03-29 ENCOUNTER — HOSPITAL ENCOUNTER (EMERGENCY)
Age: 56
Discharge: HOME | End: 2021-03-29
Payer: MEDICAID

## 2021-03-29 VITALS
DIASTOLIC BLOOD PRESSURE: 87 MMHG | SYSTOLIC BLOOD PRESSURE: 155 MMHG | HEIGHT: 68 IN | HEART RATE: 82 BPM | OXYGEN SATURATION: 98 % | RESPIRATION RATE: 18 BRPM | TEMPERATURE: 98.1 F | BODY MASS INDEX: 24.25 KG/M2 | WEIGHT: 160 LBS

## 2021-03-29 DIAGNOSIS — M54.50 ACUTE EXACERBATION OF CHRONIC LOW BACK PAIN: Primary | ICD-10-CM

## 2021-03-29 DIAGNOSIS — G89.29 ACUTE EXACERBATION OF CHRONIC LOW BACK PAIN: Primary | ICD-10-CM

## 2021-03-29 PROCEDURE — 6360000002 HC RX W HCPCS: Performed by: NURSE PRACTITIONER

## 2021-03-29 PROCEDURE — 99282 EMERGENCY DEPT VISIT SF MDM: CPT

## 2021-03-29 PROCEDURE — 96372 THER/PROPH/DIAG INJ SC/IM: CPT

## 2021-03-29 RX ORDER — ORPHENADRINE CITRATE 30 MG/ML
60 INJECTION INTRAMUSCULAR; INTRAVENOUS ONCE
Status: COMPLETED | OUTPATIENT
Start: 2021-03-29 | End: 2021-03-29

## 2021-03-29 RX ORDER — HYDROMORPHONE HYDROCHLORIDE 1 MG/ML
1 INJECTION, SOLUTION INTRAMUSCULAR; INTRAVENOUS; SUBCUTANEOUS ONCE
Status: COMPLETED | OUTPATIENT
Start: 2021-03-29 | End: 2021-03-29

## 2021-03-29 RX ADMIN — HYDROMORPHONE HYDROCHLORIDE 1 MG: 1 INJECTION, SOLUTION INTRAMUSCULAR; INTRAVENOUS; SUBCUTANEOUS at 13:32

## 2021-03-29 RX ADMIN — ORPHENADRINE CITRATE 60 MG: 60 INJECTION INTRAMUSCULAR; INTRAVENOUS at 13:32

## 2021-03-29 ASSESSMENT — ENCOUNTER SYMPTOMS
ABDOMINAL PAIN: 0
BACK PAIN: 1
RESPIRATORY NEGATIVE: 1

## 2021-03-29 NOTE — ED PROVIDER NOTES
[facility]  eMERGENCY dEPARTMENT eNCOUnter      Pt Name: Chandrakant Prajapati  MRN: [id number]  Emilygfgumaro 1965  Date of evaluation: 3/29/2021  Provider: YOLANDA Spence    CHIEF COMPLAINT       Chief Complaint   Patient presents with    Back Pain     chronic, was working on camper and twisted the wrong way         HISTORY OF PRESENT ILLNESS   (Location/Symptom, Timing/Onset,Context/Setting, Quality, Duration, Modifying Factors, Severity)  Note limiting factors. Oumar Hernandez a 54 y.o. male who presents to the emergency department for evaluation of back pain. Pt tells me that he has chronic back pain with increased low back pain from baseline over past couple of days while working on his camper. He denies fall as well as direct injury to his back. He has had no fevers, abdominal pain or changes in bowel/bladder. He has had no lower extremity numbness or weakness. He tells me that he is moving to [place] anticipating treatment by pain management there. He denies new or worsening saddle anesthesia giving history of prostatectomy for prostate cancer 10 yrs ago. HPI    Nursing Notes were reviewed. REVIEW OF SYSTEMS    (2-9 systems for level 4, 10 or more for level 5)     Review of Systems   Constitutional: Negative. Respiratory: Negative. Cardiovascular: Negative. Gastrointestinal: Negative for abdominal pain. Musculoskeletal: Positive for back pain. Neurological: Negative for weakness and numbness. A complete review of systems was performed and is negative except as noted above in the HPI.        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)     Prostate    GERD (gastroesophageal reflux disease)          SURGICAL HISTORY       Past Surgical History:   Procedure Laterality Date    ANKLE FRACTURE SURGERY Left 7/15/2019    OPEN REDUCTION INTERNAL FIXATION LEFT DISTAL FIBULA WITH POSSIBLE SYNDESMOTIC FIXATION performed by Beka Maher MD at [address] [facility]       Discharge Medication List as of 3/29/2021  1:09 PM      CONTINUE these medications which have NOT CHANGED    Details   ibuprofen (IBU) 800 MG tablet Take 1 tablet by mouth every 6 hours as needed for Pain, Disp-20 tablet, R-0Print             ALLERGIES     Patient has no known allergies. FAMILY HISTORY     No family history on file.        SOCIAL HISTORY       Social History     Socioeconomic History    Marital status:      Spouse name: Not on file    Number of children: Not on file    Years of education: Not on file    Highest education level: Not on file   Occupational History    Not on file   Social Needs    Financial resource strain: Not on file    Food insecurity     Worry: Not on file     Inability: Not on file    Transportation needs     Medical: Not on file     Non-medical: Not on file   Tobacco Use    Smoking status: Current Every Day Smoker     Packs/day: 1.00     Types: Cigarettes    Smokeless tobacco: Never Used   Substance and Sexual Activity    Alcohol use: No    Drug use: No    Sexual activity: Not on file   Lifestyle    Physical activity     Days per week: Not on file     Minutes per session: Not on file    Stress: Not on file   Relationships    Social connections     Talks on phone: Not on file     Gets together: Not on file     Attends Restorationist service: Not on file     Active member of club or organization: Not on file     Attends meetings of clubs or organizations: Not on file     Relationship status: Not on file    Intimate partner violence     Fear of current or ex partner: Not on file     Emotionally abused: Not on file     Physically abused: Not on file     Forced sexual activity: Not on file   Other Topics Concern    Not on file   Social History Narrative    Not on file SCREENINGS             PHYSICAL EXAM    (up to 7 for level 4, 8 or more for level 5)     ED Triage Vitals [03/29/21 1118]   BP Temp Temp Source Pulse Resp SpO2 Height Weight   (!) 155/87 98.1 °F (36.7 °C) Temporal 82 18 98 % 5' 8\" (1.727 m) 160 lb (72.6 kg)       Physical Exam  Vitals signs reviewed. HENT:      Head: Normocephalic. Right Ear: External ear normal.      Left Ear: External ear normal.   Eyes:      Conjunctiva/sclera: Conjunctivae normal.      Pupils: Pupils are equal, round, and reactive to light. Neck:      Musculoskeletal: Normal range of motion. Cardiovascular:      Rate and Rhythm: Normal rate and regular rhythm. Heart sounds: Normal heart sounds. Pulmonary:      Effort: Pulmonary effort is normal.      Breath sounds: Normal breath sounds. Abdominal:      General: Bowel sounds are normal.      Palpations: Abdomen is soft. Musculoskeletal: Normal range of motion. Comments: 5/5 MS equal bilateral lower extremities at hips/knees/ankles   Skin:     General: Skin is warm and dry. Neurological:      Mental Status: He is alert and oriented to person, place, and time.       Comments: DTR's 2+ equal bilateral patella/achilles  Normal sensation to light touch bilateral lower extremities         DIAGNOSTIC RESULTS     EKG: All EKG's are interpreted by the Emergency Department Physician who either signs or Co-signs this chart in the absence of acardiologist.        RADIOLOGY:   Non-plain film images such as CT, Ultrasound andMRI are read by the radiologist. Plain radiographic images are visualized and preliminarily interpreted by the emergency physician with the below findings:        Interpretation per the Radiologist below, if available at the time of this note:    No orders to display         ED BEDSIDE ULTRASOUND:   Performed by ED Physician - none    LABS:  Labs Reviewed - No data to display    All other labs were within normal range or not returned as of this dictation. RE-ASSESSMENT     Offered patient imagining of lumbar spine as he has history of prostate cancer treated with prostatectomy. He declined this relating that he has had radiologic evaluation of low back in the past 6 months. EMERGENCY DEPARTMENT COURSE and DIFFERENTIALDIAGNOSIS/MDM:   Vitals:    Vitals:    03/29/21 1118   BP: (!) 155/87   Pulse: 82   Resp: 18   Temp: 98.1 °F (36.7 °C)   TempSrc: Temporal   SpO2: 98%   Weight: 160 lb (72.6 kg)   Height: 5' 8\" (1.727 m)       MDM      CONSULTS:  None    PROCEDURES:  Unless otherwise notedbelow, none     Procedures    FINAL IMPRESSION     1.  Acute exacerbation of chronic low back pain          DISPOSITION/PLAN   DISPOSITION        PATIENT REFERRED TO:  Ana Ashley MD  [facility]  [phone]    Schedule an appointment as soon as possible for a visit   As needed      DISCHARGE MEDICATIONS:       Discharge Medication List as of 3/29/2021  1:09 PM          (Pleasenote that portions of this note were completed with a voice recognition program.  Efforts were made to edit the dictations but occasionally words are mis-transcribed.)              Anel Antunez, YOLANDA  03/29/21 3277

## 2021-04-19 ENCOUNTER — HOSPITAL ENCOUNTER (EMERGENCY)
Age: 56
Discharge: HOME | End: 2021-04-19
Attending: EMERGENCY MEDICINE
Payer: MEDICAID

## 2021-04-19 VITALS
HEIGHT: 68 IN | HEART RATE: 74 BPM | BODY MASS INDEX: 24.25 KG/M2 | TEMPERATURE: 97.9 F | SYSTOLIC BLOOD PRESSURE: 140 MMHG | DIASTOLIC BLOOD PRESSURE: 90 MMHG | RESPIRATION RATE: 18 BRPM | WEIGHT: 160 LBS | OXYGEN SATURATION: 96 %

## 2021-04-19 DIAGNOSIS — M54.50 ACUTE EXACERBATION OF CHRONIC LOW BACK PAIN: Primary | ICD-10-CM

## 2021-04-19 DIAGNOSIS — G89.29 ACUTE EXACERBATION OF CHRONIC LOW BACK PAIN: Primary | ICD-10-CM

## 2021-04-19 PROCEDURE — 6360000002 HC RX W HCPCS: Performed by: EMERGENCY MEDICINE

## 2021-04-19 PROCEDURE — 99283 EMERGENCY DEPT VISIT LOW MDM: CPT

## 2021-04-19 PROCEDURE — 6370000000 HC RX 637 (ALT 250 FOR IP): Performed by: EMERGENCY MEDICINE

## 2021-04-19 PROCEDURE — 96372 THER/PROPH/DIAG INJ SC/IM: CPT

## 2021-04-19 RX ORDER — METHYLPREDNISOLONE 4 MG/1
TABLET ORAL
Qty: 1 KIT | Refills: 0 | Status: SHIPPED | OUTPATIENT
Start: 2021-04-19 | End: 2021-04-20

## 2021-04-19 RX ORDER — TIZANIDINE 2 MG/1
2 TABLET ORAL EVERY 8 HOURS PRN
Qty: 10 TABLET | Refills: 0 | Status: SHIPPED | OUTPATIENT
Start: 2021-04-19 | End: 2021-04-20

## 2021-04-19 RX ORDER — KETOROLAC TROMETHAMINE 30 MG/ML
30 INJECTION, SOLUTION INTRAMUSCULAR; INTRAVENOUS ONCE
Status: COMPLETED | OUTPATIENT
Start: 2021-04-19 | End: 2021-04-19

## 2021-04-19 RX ORDER — TIZANIDINE 4 MG/1
2 TABLET ORAL ONCE
Status: COMPLETED | OUTPATIENT
Start: 2021-04-19 | End: 2021-04-19

## 2021-04-19 RX ORDER — HYDROCODONE BITARTRATE AND ACETAMINOPHEN 5; 325 MG/1; MG/1
1 TABLET ORAL ONCE
Status: COMPLETED | OUTPATIENT
Start: 2021-04-19 | End: 2021-04-19

## 2021-04-19 RX ORDER — NAPROXEN 500 MG/1
500 TABLET ORAL 2 TIMES DAILY WITH MEALS
Qty: 20 TABLET | Refills: 0 | Status: SHIPPED | OUTPATIENT
Start: 2021-04-19 | End: 2021-04-29

## 2021-04-19 RX ORDER — FAMOTIDINE 20 MG/1
20 TABLET, FILM COATED ORAL 2 TIMES DAILY
Qty: 60 TABLET | Refills: 0 | Status: SHIPPED | OUTPATIENT
Start: 2021-04-19 | End: 2021-05-19

## 2021-04-19 RX ADMIN — TIZANIDINE 2 MG: 4 TABLET ORAL at 10:08

## 2021-04-19 RX ADMIN — HYDROCODONE BITARTRATE AND ACETAMINOPHEN 1 TABLET: 5; 325 TABLET ORAL at 10:08

## 2021-04-19 RX ADMIN — KETOROLAC TROMETHAMINE 30 MG: 30 INJECTION, SOLUTION INTRAMUSCULAR; INTRAVENOUS at 10:08

## 2021-04-19 ASSESSMENT — PAIN DESCRIPTION - PAIN TYPE: TYPE: ACUTE PAIN

## 2021-04-19 ASSESSMENT — ENCOUNTER SYMPTOMS
COUGH: 0
RHINORRHEA: 0
VOICE CHANGE: 0
VOMITING: 0
BACK PAIN: 1
EYE REDNESS: 0
EYE PAIN: 0
DIARRHEA: 0
SHORTNESS OF BREATH: 0
ABDOMINAL PAIN: 0

## 2021-04-19 ASSESSMENT — PAIN DESCRIPTION - LOCATION: LOCATION: BACK

## 2021-04-19 ASSESSMENT — PAIN SCALES - GENERAL
PAINLEVEL_OUTOF10: 6
PAINLEVEL_OUTOF10: 10

## 2021-04-19 NOTE — ED PROVIDER NOTES
[facility] EMERGENCY DEPT  EMERGENCY DEPARTMENT ENCOUNTER      Pt Name: Luis Agarwal  MRN: [id number]  [place] 1965  Date of evaluation: 4/19/2021  Provider: Alberto Huynh MD    CHIEF COMPLAINT       Chief Complaint   Patient presents with    Back Pain         HISTORY OF PRESENT ILLNESS   (Location/Symptom, Timing/Onset,Context/Setting, Quality, Duration, Modifying Factors, Severity)  Note limiting factors. Luis Agarwal is a 54 y.o. male who presents to the emergency department with complaint of midline low back pain that has been worse over the last several days. States he is currently living in [place] but came back here to get his camper. States he is leaving tomorrow to take his camper back down to [place] and did not think he could make the trip with his back hurting this bad. States he has been taking ibuprofen and acetaminophen quite a bit. States he has some muscle relaxers but does not usually take them because they just make him tired. Does not remember exactly what kind he has currently. Patient does have a history of chronic low back pain with a previous L5-S1 fusion. Also has a history of prostate issues. Denies any new weakness, numbness, tingling, or change in bowel or bladder function recently. HPI    NursingNotes were reviewed. REVIEW OF SYSTEMS    (2-9 systems for level 4, 10 or more for level 5)     Review of Systems   Constitutional: Negative for fatigue and fever. HENT: Negative for congestion, rhinorrhea and voice change. Eyes: Negative for pain and redness. Respiratory: Negative for cough and shortness of breath. Cardiovascular: Negative for chest pain. Gastrointestinal: Negative for abdominal pain, diarrhea and vomiting. Endocrine: Negative. Genitourinary: Negative. Musculoskeletal: Positive for back pain. Negative for arthralgias and gait problem. Skin: Negative for rash and wound. Neurological: Negative for weakness and headaches. Hematological: Negative. Psychiatric/Behavioral: Negative. All other systems reviewed and are negative. A complete review of systems was performed and is negative except as noted above in the HPI. 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)     Prostate    GERD (gastroesophageal reflux disease)          SURGICAL HISTORY       Past Surgical History:   Procedure Laterality Date    ANKLE FRACTURE SURGERY Left 7/15/2019    OPEN REDUCTION INTERNAL FIXATION LEFT DISTAL FIBULA WITH POSSIBLE SYNDESMOTIC FIXATION performed by Valarie Mariscal MD at [facility] [address] PROSTRA ULTRASOUND GUIDANCE      SPINAL FUSION           CURRENT MEDICATIONS       Previous Medications    IBUPROFEN (IBU) 800 MG TABLET    Take 1 tablet by mouth every 6 hours as needed for Pain       ALLERGIES     Patient has no known allergies. FAMILY HISTORY     History reviewed. No pertinent family history.        SOCIAL HISTORY       Social History     Socioeconomic History    Marital status: Legally      Spouse name: None    Number of children: None    Years of education: None    Highest education level: None   Occupational History    None   Social Needs    Financial resource strain: None    Food insecurity     Worry: None     Inability: None    Transportation needs     Medical: None     Non-medical: None   Tobacco Use    Smoking status: Current Every Day Smoker     Packs/day: 1.00     Types: Cigarettes    Smokeless tobacco: Never Used   Substance and Sexual Activity    Alcohol use: No    Drug use: No    Sexual activity: None   Lifestyle    Physical activity     Days per week: None     Minutes per session: None    Stress: None   Relationships    Social connections     Talks on phone: None     Gets together: None     Attends Denominational service: None     Active member of club or organization: None     Attends meetings of clubs or organizations: None     Relationship status: None    Intimate partner violence     Fear of current or ex partner: None     Emotionally abused: None     Physically abused: None     Forced sexual activity: None   Other Topics Concern    None   Social History Narrative    None       SCREENINGS             PHYSICAL EXAM    (up to 7 for level 4, 8 or more for level 5)     ED Triage Vitals   BP Temp Temp src Pulse Resp SpO2 Height Weight   04/19/21 0831 04/19/21 0829 -- 04/19/21 0829 04/19/21 0829 04/19/21 0829 04/19/21 0829 04/19/21 0829   (!) 140/90 97.9 °F (36.6 °C)  74 18 96 % 5' 8\" (1.727 m) 160 lb (72.6 kg)       Physical Exam  Vitals signs and nursing note reviewed. Constitutional:       General: He is not in acute distress. Appearance: He is well-developed. He is not toxic-appearing or diaphoretic. HENT:      Head: Normocephalic and atraumatic. Eyes:      General: No scleral icterus. Right eye: No discharge. Left eye: No discharge. Pupils: Pupils are equal, round, and reactive to light. Neck:      Musculoskeletal: Normal range of motion. Cardiovascular:      Rate and Rhythm: Normal rate and regular rhythm. Pulmonary:      Effort: Pulmonary effort is normal. No respiratory distress. Breath sounds: No stridor. Abdominal:      General: There is no distension. Musculoskeletal: Normal range of motion. General: No deformity. Skin:     General: Skin is warm and dry. Neurological:      Mental Status: He is alert and oriented to person, place, and time. GCS: GCS eye subscore is 4. GCS verbal subscore is 5. GCS motor subscore is 6. Cranial Nerves: No cranial nerve deficit. Sensory: Sensation is intact. Motor: Motor function is intact. No abnormal muscle tone. Psychiatric:         Behavior: Behavior normal.         Thought Content:  Thought content normal.         Judgment: Judgment normal. DIAGNOSTIC RESULTS     EKG: All EKG's are interpreted by the Emergency Department Physician who either signs or Co-signs this chart in the absence of a cardiologist.      RADIOLOGY:   Non-plain film images such as CT, Ultrasound and MRI are read by the radiologist. Plainradiographic images are visualized and preliminarily interpreted by the emergency physician with the below findings:        Interpretation per the Radiologist below, if available at the time of this note:    No orders to display         ED BEDSIDE ULTRASOUND:   Performed by ED Physician - none    LABS:  Labs Reviewed - No data to display    All other labs were within normal range or not returned as of this dictation. EMERGENCY DEPARTMENT COURSE and DIFFERENTIALDIAGNOSIS/MDM:   Vitals:    Vitals:    04/19/21 0829 04/19/21 0831   BP:  (!) 140/90   Pulse: 74    Resp: 18    Temp: 97.9 °F (36.6 °C)    SpO2: 96%    Weight: 160 lb (72.6 kg)    Height: 5' 8\" (1.727 m)        MDM   No red flags on evaluation here. Patient does report having recent imaging on his back. There are some trace last year from [facility] [place] but no other recent imaging here. Patient relates wanting something for symptom control here and specifically want something that will not be sedating. States he has been on steroid packs in the past and seemed to tolerate them pretty well. Also will switch to naproxen from ibuprofen given patient's reported high doses that he has been taking. Also try smaller dose of Zanaflex. Patient unsure if he has tried this before but hopefully would not be as sedating. Patient advised not to drive on it and only take it at times when he would be able to rest.  Evaluation and work-up here revealed no signs of emergent or life-threatening pathology that would necessitate admission for further work-up or management at this time.   Patient is felt to be stable for discharge home with return precautions for worsening of the condition or development of new concerning symptoms. Patient was encouraged to follow-up with their primary care doctor in the appropriate timeframe. Necessary prescriptions and information have been provided for treatment at home. Patient voices understanding and agreement with the plan. CONSULTS:  None    PROCEDURES:  Unless otherwise notedbelow, none     Procedures    FINAL IMPRESSION     1. Acute exacerbation of chronic low back pain          DISPOSITION/PLAN   DISPOSITION Decision To Discharge 04/19/2021 09:54:36 AM      PATIENT REFERRED TO:  [facility] EMERGENCY DEPT  Buddy Johnson  [phone]    If symptoms worsen    MD Danny Chun  [phone]      As needed      DISCHARGE MEDICATIONS:  New Prescriptions    FAMOTIDINE (PEPCID) 20 MG TABLET    Take 1 tablet by mouth 2 times daily    METHYLPREDNISOLONE (MEDROL, ADELAIDE,) 4 MG TABLET    Take by mouth.     NAPROXEN (NAPROSYN) 500 MG TABLET    Take 1 tablet by mouth 2 times daily (with meals) for 10 days    TIZANIDINE (ZANAFLEX) 2 MG TABLET    Take 1 tablet by mouth every 8 hours as needed (pain/spasm)          (Please note that portions of this note were completed with a voice recognition program.  Efforts were made to edit the dictations butoccasionally words are mis-transcribed.)    Partha Sweeney MD (electronically signed)  Emergency Physician         Partha Rowe MD  04/19/21 1007

## 2021-04-20 ENCOUNTER — HOSPITAL ENCOUNTER (EMERGENCY)
Age: 56
Discharge: HOME | End: 2021-04-20
Payer: MEDICAID

## 2021-04-20 ENCOUNTER — APPOINTMENT (OUTPATIENT)
Dept: CT IMAGING | Age: 56
End: 2021-04-20
Payer: MEDICAID

## 2021-04-20 VITALS
SYSTOLIC BLOOD PRESSURE: 139 MMHG | OXYGEN SATURATION: 98 % | HEART RATE: 76 BPM | WEIGHT: 160 LBS | DIASTOLIC BLOOD PRESSURE: 76 MMHG | RESPIRATION RATE: 18 BRPM | HEIGHT: 68 IN | TEMPERATURE: 98.2 F | BODY MASS INDEX: 24.25 KG/M2

## 2021-04-20 DIAGNOSIS — M54.50 ACUTE EXACERBATION OF CHRONIC LOW BACK PAIN: Primary | ICD-10-CM

## 2021-04-20 DIAGNOSIS — G89.29 ACUTE EXACERBATION OF CHRONIC LOW BACK PAIN: Primary | ICD-10-CM

## 2021-04-20 PROCEDURE — 6360000002 HC RX W HCPCS: Performed by: PHYSICIAN ASSISTANT

## 2021-04-20 PROCEDURE — 96372 THER/PROPH/DIAG INJ SC/IM: CPT

## 2021-04-20 PROCEDURE — 72131 CT LUMBAR SPINE W/O DYE: CPT

## 2021-04-20 PROCEDURE — 99283 EMERGENCY DEPT VISIT LOW MDM: CPT

## 2021-04-20 RX ORDER — CYCLOBENZAPRINE HCL 10 MG
10 TABLET ORAL 3 TIMES DAILY PRN
Qty: 21 TABLET | Refills: 0 | Status: SHIPPED | OUTPATIENT
Start: 2021-04-20 | End: 2021-04-30

## 2021-04-20 RX ORDER — PREDNISONE 10 MG/1
10 TABLET ORAL DAILY
Qty: 10 TABLET | Refills: 0 | Status: SHIPPED | OUTPATIENT
Start: 2021-04-20 | End: 2021-04-30

## 2021-04-20 RX ORDER — ORPHENADRINE CITRATE 30 MG/ML
60 INJECTION INTRAMUSCULAR; INTRAVENOUS ONCE
Status: COMPLETED | OUTPATIENT
Start: 2021-04-20 | End: 2021-04-20

## 2021-04-20 RX ORDER — HYDROMORPHONE HYDROCHLORIDE 1 MG/ML
0.5 INJECTION, SOLUTION INTRAMUSCULAR; INTRAVENOUS; SUBCUTANEOUS ONCE
Status: COMPLETED | OUTPATIENT
Start: 2021-04-20 | End: 2021-04-20

## 2021-04-20 RX ADMIN — HYDROMORPHONE HYDROCHLORIDE 0.5 MG: 1 INJECTION, SOLUTION INTRAMUSCULAR; INTRAVENOUS; SUBCUTANEOUS at 10:37

## 2021-04-20 RX ADMIN — ORPHENADRINE CITRATE 60 MG: 30 INJECTION INTRAMUSCULAR; INTRAVENOUS at 10:38

## 2021-04-20 ASSESSMENT — PAIN DESCRIPTION - LOCATION: LOCATION: BACK

## 2021-04-20 ASSESSMENT — ENCOUNTER SYMPTOMS
APNEA: 0
COUGH: 0
COLOR CHANGE: 0
PHOTOPHOBIA: 0
EYE DISCHARGE: 0
SHORTNESS OF BREATH: 0
EYE ITCHING: 0
BACK PAIN: 1

## 2021-04-20 ASSESSMENT — PAIN SCALES - GENERAL: PAINLEVEL_OUTOF10: 10

## 2021-04-20 NOTE — ED PROVIDER NOTES
[facility] EMERGENCY DEPT  eMERGENCYdEPARTMENT eNCOUnter      Pt Name: Germaine Macias  MRN: [id number]  [place] 1965  Date of evaluation: 4/20/2021  Provider:KIMBER Hansen    CHIEF COMPLAINT       Chief Complaint   Patient presents with    Back Pain     CHRONIC BACK PAIN          HISTORY OF PRESENT ILLNESS  (Location/Symptom, Timing/Onset, Context/Setting, Quality, Duration, Modifying Factors, Severity.)   Germaine Macias is a 54 y.o. male who presents to the emergency department with complaints of lower back pain he was seen here yesterday for similar complaints states has been working on his camper he is headed back to [place]. Originally from [place] he has a history of L5-S1 spinal fusion had seen Dr. Marlon López with orthopedic [place] in the past.  10 years ago had his prostate removed for cancer. Patient states it has been a long time since he had any imaging done. He baseline has intermittent incontinence due to having his prostate removed this been ongoing for a decade patient tells me he feels like that has not changed. He denies any rectal numbness denies any weakness or ambulatory dysfunction with his lower extremities. Patient is here because he still having a lot of back pain prior to driving home; [address] drive ahead. He is concerned that his pain is poorly controlled if he will be able to make this drive back to [place] where he is currently residing. He denies any abdominal pain no fever. He has radicular symptoms involving both lower extremities. Pain 10/10 froy is pending. HPI    Nursing Notes were reviewed and I agree. REVIEW OF SYSTEMS    (2-9 systems for level 4, 10 or more for level 5)     Review of Systems   Constitutional: Negative for activity change, appetite change, chills and fever. HENT: Negative for congestion and dental problem. Eyes: Negative for photophobia, discharge and itching. Respiratory: Negative for apnea, cough and shortness of breath.     Cardiovascular: Negative for chest pain. Musculoskeletal: Positive for back pain and myalgias. Negative for arthralgias, gait problem and neck pain. Skin: Negative for color change, pallor and rash. Neurological: Negative for dizziness, seizures and syncope. Psychiatric/Behavioral: Negative for agitation. The patient is not nervous/anxious. Except as noted above the remainder of the review of systems was reviewed and negative. 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)     Prostate    GERD (gastroesophageal reflux disease)          SURGICAL HISTORY       Past Surgical History:   Procedure Laterality Date    ANKLE FRACTURE SURGERY Left 7/15/2019    OPEN REDUCTION INTERNAL FIXATION LEFT DISTAL FIBULA WITH POSSIBLE SYNDESMOTIC FIXATION performed by Bereket Elam MD at [address] [place] [address] ULTRASOUND GUIDANCE      SPINAL FUSION           CURRENT MEDICATIONS       Previous Medications    FAMOTIDINE (PEPCID) 20 MG TABLET    Take 1 tablet by mouth 2 times daily    IBUPROFEN (IBU) 800 MG TABLET    Take 1 tablet by mouth every 6 hours as needed for Pain    NAPROXEN (NAPROSYN) 500 MG TABLET    Take 1 tablet by mouth 2 times daily (with meals) for 10 days       ALLERGIES     Patient has no known allergies. FAMILY HISTORY     No family history on file.        SOCIAL HISTORY       Social History     Socioeconomic History    Marital status: Legally      Spouse name: Not on file    Number of children: Not on file    Years of education: Not on file    Highest education level: Not on file   Occupational History    Not on file   Social Needs    Financial resource strain: Not on file    Food insecurity     Worry: Not on file     Inability: Not on file    Transportation needs     Medical: Not on file     Non-medical: Not on file   Tobacco Use    Smoking status: Current Every Day Smoker     Packs/day: 1.00     Types: Cigarettes    Smokeless tobacco: Never Used   Substance and Sexual Activity    Alcohol use: No    Drug use: No    Sexual activity: Not on file   Lifestyle    Physical activity     Days per week: Not on file     Minutes per session: Not on file    Stress: Not on file   Relationships    Social connections     Talks on phone: Not on file     Gets together: Not on file     Attends Sikhism service: Not on file     Active member of club or organization: Not on file     Attends meetings of clubs or organizations: Not on file     Relationship status: Not on file    Intimate partner violence     Fear of current or ex partner: Not on file     Emotionally abused: Not on file     Physically abused: Not on file     Forced sexual activity: Not on file   Other Topics Concern    Not on file   Social History Narrative    Not on file       SCREENINGS           PHYSICAL EXAM    (up to 7 forlevel 4, 8 or more for level 5)     ED Triage Vitals [04/20/21 1009]   BP Temp Temp Source Pulse Resp SpO2 Height Weight   (!) 144/93 98.2 °F (36.8 °C) Oral 77 16 96 % 5' 8\" (1.727 m) 160 lb (72.6 kg)       Physical Exam  Vitals signs and nursing note reviewed. Constitutional:       General: He is not in acute distress. Appearance: Normal appearance. He is well-developed. He is not diaphoretic. HENT:      Head: Normocephalic and atraumatic. Right Ear: Tympanic membrane, ear canal and external ear normal.      Left Ear: Tympanic membrane, ear canal and external ear normal.      Nose: Nose normal.      Mouth/Throat:      Mouth: Mucous membranes are moist.   Eyes:      Pupils: Pupils are equal, round, and reactive to light. Neck:      Musculoskeletal: Normal range of motion and neck supple. Trachea: No tracheal deviation. Cardiovascular:      Rate and Rhythm: Normal rate and regular rhythm. Pulses: Normal pulses. Heart sounds: Normal heart sounds.  No murmur. Pulmonary:      Effort: Pulmonary effort is normal.      Breath sounds: Normal breath sounds. No stridor. No wheezing. Chest:      Chest wall: No tenderness. Abdominal:      General: Bowel sounds are normal. There is no distension. Palpations: Abdomen is soft. Tenderness: There is no abdominal tenderness. Musculoskeletal: Normal range of motion. General: Tenderness present. No signs of injury. Arms:    Skin:     General: Skin is warm and dry. Capillary Refill: Capillary refill takes less than 2 seconds. Neurological:      General: No focal deficit present. Mental Status: He is alert and oriented to person, place, and time. Psychiatric:         Mood and Affect: Mood normal.         Behavior: Behavior normal.         Thought Content: Thought content normal.         Judgment: Judgment normal.           DIAGNOSTIC RESULTS     RADIOLOGY:   Non-plain film images such as CT, Ultrasound and MRI are read by the radiologist. Lennart Plana radiographic images are visualized and preliminarilyinterpreted by No att. providers found with the below findings:      Interpretation per the Radiologist below, if available at the time of this note:    CT Lumbar Spine WO Contrast   Final Result   1. Anterior fusion of L5 and S1 with mild right L5-S1 neural foramen   narrowing. Loss of normal lordosis with no abnormal subluxation. No   acute lumbar vertebral pathology. 2. 1.4 cm nonspecific right adrenal nodule. Comparison to any outside   CT studies would be helpful to assess stability of the finding. Signed by Dr Stew Argueta on 4/20/2021 11:23 AM          LABS:  Labs Reviewed - No data to display    All other labs were within normal range or notreturned as of this dictation.     RE-ASSESSMENT        EMERGENCY DEPARTMENT COURSE and DIFFERENTIAL DIAGNOSIS/MDM:   Vitals:    Vitals:    04/20/21 1009   BP: (!) 144/93   Pulse: 77   Resp: 16   Temp: 98.2 °F (36.8 °C)   TempSrc: Oral   SpO2: 96%   Weight: 160 lb (72.6 kg)   Height: 5' 8\" (1.727 m)       MDM  I discussed patient's finding on his adrenal gland he will follow up with PCP in that regard for CT. His pain is treated here I do advise that we can adjust his steroids and his muscle relaxer but if this is a more of a persistent chronic issue to consider narcotics from PCP versus pain management referral.  Without any other new symptoms or complaints felt he was appropriate for discharge. He ambulates here in ED on his own. PROCEDURES:    Procedures      FINAL IMPRESSION      1.  Acute exacerbation of chronic low back pain          DISPOSITION/PLAN   DISPOSITION Discharge - Pending Orders Complete 04/20/2021 11:36:03 AM      PATIENT REFERRED TO:  [facility] EMERGENCY DEPT  Buddy Johnson  [phone]    If symptoms worsen    Keiko Chatterjee MD  [facility]  [phone]    Call   for persistent issues; may consider short term narcotics vs pain mgmt      DISCHARGE MEDICATIONS:  New Prescriptions    CYCLOBENZAPRINE (FLEXERIL) 10 MG TABLET    Take 1 tablet by mouth 3 times daily as needed for Muscle spasms    PREDNISONE (DELTASONE) 10 MG TABLET    Take 1 tablet by mouth daily for 10 days       (Please note that portions of this note were completed with a voice recognition program.  Efforts were made to edit the dictations but occasionallywords are mis-transcribed.)    Agustin Armando [address] [place] [address]  04/20/21 7269

## 2021-06-22 ENCOUNTER — TELEPHONE (OUTPATIENT)
Dept: FAMILY MEDICINE CLINIC | Facility: CLINIC | Age: 56
End: 2021-06-22

## 2021-06-22 NOTE — TELEPHONE ENCOUNTER
Caller: Magalie Myers    Relationship: Self    Best call back number: [phone]    What is the medical concern/diagnosis:   LARGE MASS ON RIGHT KIDNEY    What specialty or service is being requested:   NEPHROLOGIST    What is the provider, practice or medical service name:     [facility]    What is the office location:   [place]    What is the office phone number:   N/A    Any additional details:   PATIENT WANTED TO REQUEST TO HAVE SPECIALIST APPOINTMENT SCHEDULED THE WEEK OF JULY 12-16

## 2021-06-22 NOTE — TELEPHONE ENCOUNTER
[place] patient will have to be referred to [facility] and I will need an office note in order to refer him

## 2021-08-02 ENCOUNTER — OFFICE VISIT (OUTPATIENT)
Dept: FAMILY MEDICINE CLINIC | Facility: CLINIC | Age: 56
End: 2021-08-02

## 2021-08-02 VITALS
WEIGHT: 152 LBS | HEART RATE: 72 BPM | OXYGEN SATURATION: 97 % | TEMPERATURE: 98.2 F | HEIGHT: 68 IN | DIASTOLIC BLOOD PRESSURE: 70 MMHG | BODY MASS INDEX: 23.04 KG/M2 | SYSTOLIC BLOOD PRESSURE: 120 MMHG | RESPIRATION RATE: 18 BRPM

## 2021-08-02 DIAGNOSIS — I25.10 CORONARY ARTERY DISEASE INVOLVING NATIVE HEART, ANGINA PRESENCE UNSPECIFIED, UNSPECIFIED VESSEL OR LESION TYPE: Primary | ICD-10-CM

## 2021-08-02 DIAGNOSIS — N28.89 RENAL MASS: ICD-10-CM

## 2021-08-02 PROBLEM — Z12.11 ENCOUNTER FOR SCREENING COLONOSCOPY: Status: ACTIVE | Noted: 2020-08-18

## 2021-08-02 PROCEDURE — 99213 OFFICE O/P EST LOW 20 MIN: CPT | Performed by: FAMILY MEDICINE

## 2021-08-02 RX ORDER — ATORVASTATIN CALCIUM 10 MG/1
10 TABLET, FILM COATED ORAL
COMMUNITY
Start: 2021-07-12 | End: 2022-04-18 | Stop reason: ALTCHOICE

## 2021-08-02 RX ORDER — CLOPIDOGREL BISULFATE 75 MG/1
75 TABLET ORAL DAILY
COMMUNITY
Start: 2021-07-12 | End: 2022-07-07 | Stop reason: SDUPTHER

## 2021-08-02 RX ORDER — ASPIRIN 81 MG/1
81 TABLET, COATED ORAL DAILY
COMMUNITY
Start: 2021-07-12 | End: 2022-05-02

## 2021-08-02 NOTE — PROGRESS NOTES
Subjective   Magalie Myers is a 55 y.o. male.     Chief Complaint   Patient presents with   • Cardiac surgery     Hospital follow-up 07/08/2021       History of Present Illness     he recently found to have cad in [place] and had cabg--he would like to be set up with cardiologist here--he has given up smoking  He says he was also found to have a mass on his kidney    Current Outpatient Medications:   •  acetaminophen (TYLENOL) 500 MG tablet, Take 1,000 mg by mouth., Disp: , Rfl:   •  Aspirin Low Dose 81 MG EC tablet, Take 81 mg by mouth Daily., Disp: , Rfl:   •  atorvastatin (LIPITOR) 10 MG tablet, Take 10 mg by mouth every night at bedtime., Disp: , Rfl:   •  clopidogrel (PLAVIX) 75 MG tablet, Take 75 mg by mouth Daily., Disp: , Rfl:   •  esomeprazole (nexIUM) 20 MG capsule, Take 40 mg by mouth Daily., Disp: , Rfl:   •  HYDROcodone-acetaminophen (Norco) 7.5-325 MG per tablet, Take 1 tablet by mouth Every 6 (Six) Hours As Needed for Moderate Pain ., Disp: 68 tablet, Rfl: 0  •  metoprolol tartrate (LOPRESSOR) 25 MG tablet, 1 tablet 2 (two) times a day., Disp: , Rfl:   No Known Allergies    Past Medical History:   Diagnosis Date   • GERD (gastroesophageal reflux disease)    • Injury of back    • Prostate CA (CMS/HCC)      Past Surgical History:   Procedure Laterality Date   • BACK SURGERY      Patient has had 2 previous back surgeries.    • ENDOSCOPY N/A 3/8/2021    Procedure: ESOPHAGOGASTRODUODENOSCOPY WITH ANESTHESIA;  Surgeon: Indra Shabazz MD;  Location: [facility];  Service: Gastroenterology;  Laterality: N/A;  pre food bolus  post food bolus  dr cruz ascencio   • PROSTATE SURGERY         Review of Systems   Constitutional: Negative.    HENT: Negative.    Eyes: Negative.    Respiratory: Negative.    Cardiovascular: Positive for chest pain.   Gastrointestinal: Negative.    Endocrine: Negative.    Genitourinary: Negative.    Musculoskeletal: Negative.    Skin: Negative.    Allergic/Immunologic: Negative.   "  Neurological: Negative.    Hematological: Negative.    Psychiatric/Behavioral: Negative.        Objective  /70 (BP Location: Left arm, Patient Position: Sitting, Cuff Size: Adult)   Pulse 72   Temp 98.2 °F (36.8 °C) (Infrared)   Resp 18   Ht 172.7 cm (68\")   Wt 68.9 kg (152 lb)   SpO2 97%   BMI 23.11 kg/m²   Physical Exam  Vitals and nursing note reviewed.   Constitutional:       Appearance: Normal appearance. He is normal weight.   HENT:      Head: Normocephalic and atraumatic.      Nose: Nose normal.      Mouth/Throat:      Mouth: Mucous membranes are moist.   Eyes:      Extraocular Movements: Extraocular movements intact.      Pupils: Pupils are equal, round, and reactive to light.   Cardiovascular:      Rate and Rhythm: Normal rate and regular rhythm.      Pulses: Normal pulses.      Heart sounds: Normal heart sounds.   Pulmonary:      Effort: Pulmonary effort is normal.      Breath sounds: Normal breath sounds.   Abdominal:      General: Abdomen is flat. Bowel sounds are normal.      Palpations: Abdomen is soft.   Musculoskeletal:         General: Normal range of motion.      Cervical back: Normal range of motion and neck supple.   Skin:     General: Skin is warm and dry.      Capillary Refill: Capillary refill takes less than 2 seconds.   Neurological:      General: No focal deficit present.      Mental Status: He is alert and oriented to person, place, and time. Mental status is at baseline.   Psychiatric:         Mood and Affect: Mood normal.         Behavior: Behavior normal.         Thought Content: Thought content normal.         Judgment: Judgment normal.         Assessment/Plan   Diagnoses and all orders for this visit:    1. Coronary artery disease involving native heart, angina presence unspecified, unspecified vessel or lesion type (Primary)  -     Ambulatory Referral to Cardiology    2. Renal mass  -     US Renal Bilateral                 Orders Placed This Encounter   Procedures   • " US Renal Bilateral     Order Specific Question:   Reason for Exam:     Answer:   renal mass     Order Specific Question:   Release to patient     Answer:   Immediate   • Ambulatory Referral to Cardiology     Referral Priority:   Routine     Referral Type:   Consultation     Referral Reason:   Specialty Services Required     Referred to Provider:   Edinson Hardy MD     Requested Specialty:   Cardiology     Number of Visits Requested:   1       Follow up: 3 month(s)

## 2021-08-11 ENCOUNTER — TELEPHONE (OUTPATIENT)
Dept: FAMILY MEDICINE CLINIC | Facility: CLINIC | Age: 56
End: 2021-08-11

## 2021-08-11 ENCOUNTER — HOSPITAL ENCOUNTER (OUTPATIENT)
Dept: ULTRASOUND IMAGING | Facility: HOSPITAL | Age: 56
Discharge: HOME | End: 2021-08-11
Admitting: FAMILY MEDICINE

## 2021-08-11 PROCEDURE — 76775 US EXAM ABDO BACK WALL LIM: CPT

## 2021-08-11 NOTE — TELEPHONE ENCOUNTER
Pt called and stated that he feels light headed he took his bp and it is 108/70 pulse 61 he was just concerned cuase he had open heart surgery about 1 mo ago [phone]

## 2021-08-18 ENCOUNTER — OFFICE VISIT (OUTPATIENT)
Dept: CARDIOLOGY | Facility: CLINIC | Age: 56
End: 2021-08-18

## 2021-08-18 VITALS
BODY MASS INDEX: 22.73 KG/M2 | DIASTOLIC BLOOD PRESSURE: 70 MMHG | SYSTOLIC BLOOD PRESSURE: 115 MMHG | WEIGHT: 150 LBS | HEIGHT: 68 IN | HEART RATE: 63 BPM

## 2021-08-18 DIAGNOSIS — Z82.49 FAMILY HISTORY OF EARLY CAD: ICD-10-CM

## 2021-08-18 DIAGNOSIS — Z95.1 S/P CABG (CORONARY ARTERY BYPASS GRAFT): Primary | ICD-10-CM

## 2021-08-18 DIAGNOSIS — Z87.891 EX-SMOKER: ICD-10-CM

## 2021-08-18 DIAGNOSIS — R94.31 ABNORMAL ECG: ICD-10-CM

## 2021-08-18 DIAGNOSIS — I25.810 CORONARY ARTERY DISEASE INVOLVING CORONARY BYPASS GRAFT OF NATIVE HEART WITHOUT ANGINA PECTORIS: ICD-10-CM

## 2021-08-18 DIAGNOSIS — Z95.1 S/P CABG (CORONARY ARTERY BYPASS GRAFT): ICD-10-CM

## 2021-08-18 DIAGNOSIS — I51.9 LV DYSFUNCTION: ICD-10-CM

## 2021-08-18 PROCEDURE — 93000 ELECTROCARDIOGRAM COMPLETE: CPT | Performed by: INTERNAL MEDICINE

## 2021-08-18 PROCEDURE — 99204 OFFICE O/P NEW MOD 45 MIN: CPT | Performed by: INTERNAL MEDICINE

## 2021-08-18 NOTE — PROGRESS NOTES
Magalie Myers  [id number]  1965  56 y.o.  male    Referring Provider: Lawrence Robertson MD    Reason for  Visit:  Initial visit to to establish care with myself for ongoing longitudinal care related to cardiovascular issues   coronary artery disease and coronary artery bypass grafting x 2 in [place] 07/2021  left internal mammary artery to  left anterior descending coronary artery  and saphenous venous graft to obtuse marginal branch off left circumflex artery    Last EF was 45%   Could not graft right coronary artery     Subjective    Overall feeling well   No chest pain or shortness of breath     No palpitations  No significant pedal edema    Compliant with medications and dietary advice  Good effort tolerance    No presyncope or syncope  Compliant with medications    Tolerating current medications well with no untoward side effects   Compliant with prescribed medication regimen. Tries to adhere to cardiac diet.      Easy fatiguability   Feels tired       History of present illness:  Magalie Myers is a 56 y.o. yo male with history of coronary artery disease coronary artery bypass grafting x 2 07/2021 who presents today for   Chief Complaint   Patient presents with   • Coronary Artery Disease     Establish Care   .    History  Past Medical History:   Diagnosis Date   • Coronary artery disease    • GERD (gastroesophageal reflux disease)    • Injury of back    • Myocardial infarction (CMS/HCC)    • Prostate CA (CMS/HCC)    • TB (tuberculosis)     50 years ago   ,   Past Surgical History:   Procedure Laterality Date   • BACK SURGERY      Patient has had 2 previous back surgeries.    • CORONARY ARTERY BYPASS GRAFT      7/7/2021   • ENDOSCOPY N/A 3/8/2021    Procedure: ESOPHAGOGASTRODUODENOSCOPY WITH ANESTHESIA;  Surgeon: Indra Shabazz MD;  Location: [facility];  Service: Gastroenterology;  Laterality: N/A;  pre food bolus  post food bolus  dr lawrence robertson   • PROSTATE SURGERY     ,   Family History    Problem Relation Age of Onset   • Arthritis Mother    • Heart disease Father    • Cancer Father    • Heart attack Father    • Heart disease Maternal Grandfather    • Heart attack Maternal Grandfather    • Heart disease Paternal Grandfather    • Heart attack Paternal Grandfather    ,   Social History     Tobacco Use   • Smoking status: Former Smoker     Packs/day: 1.00     Types: Cigarettes   • Smokeless tobacco: Never Used   • Tobacco comment: quit 7/8/21   Substance Use Topics   • Alcohol use: No   • Drug use: No   ,     Medications  Current Outpatient Medications   Medication Sig Dispense Refill   • Aspirin Low Dose 81 MG EC tablet Take 81 mg by mouth Daily.     • atorvastatin (LIPITOR) 10 MG tablet Take 10 mg by mouth every night at bedtime.     • clopidogrel (PLAVIX) 75 MG tablet Take 75 mg by mouth Daily.     • esomeprazole (nexIUM) 20 MG capsule Take 40 mg by mouth Daily.     • HYDROcodone-acetaminophen (Norco) 7.5-325 MG per tablet Take 1 tablet by mouth Every 6 (Six) Hours As Needed for Moderate Pain . 68 tablet 0   • metoprolol tartrate (LOPRESSOR) 25 MG tablet 1 tablet 2 (two) times a day.     • Naproxen Sodium (ALEVE PO) Take  by mouth As Needed.     • acetaminophen (TYLENOL) 500 MG tablet Take 1,000 mg by mouth.       No current facility-administered medications for this visit.       Allergies:  Patient has no known allergies.    Review of Systems  Review of Systems   Constitutional: Negative.   HENT: Negative.    Eyes: Negative.    Cardiovascular: Negative for chest pain, claudication, cyanosis, dyspnea on exertion, irregular heartbeat, leg swelling, near-syncope, orthopnea, palpitations, paroxysmal nocturnal dyspnea and syncope.   Respiratory: Negative.    Endocrine: Negative.    Hematologic/Lymphatic: Negative.    Skin: Negative.    Gastrointestinal: Negative for anorexia.   Genitourinary: Negative.    Neurological: Negative.    Psychiatric/Behavioral: Negative.        Objective     Physical  "Exam:  /70   Pulse 63   Ht 172.7 cm (68\")   Wt 68 kg (150 lb)   BMI 22.81 kg/m²     Physical Exam  Constitutional:       Appearance: He is well-developed.   HENT:      Head: Normocephalic.   Neck:      Vascular: Normal carotid pulses. No carotid bruit or JVD.      Trachea: No tracheal tenderness or tracheal deviation.   Cardiovascular:      Rate and Rhythm: Regular rhythm.      Pulses: Normal pulses.      Heart sounds: Normal heart sounds.   Pulmonary:      Effort: Pulmonary effort is normal.      Breath sounds: No stridor.   Abdominal:      General: There is no distension.      Palpations: Abdomen is soft.      Tenderness: There is no abdominal tenderness.   Musculoskeletal:      Cervical back: No edema.   Skin:     General: Skin is warm.   Neurological:      Mental Status: He is alert.      Cranial Nerves: No cranial nerve deficit.      Sensory: No sensory deficit.   Psychiatric:         Speech: Speech normal.         Behavior: Behavior normal.       Surgical wound healed very well. No evidence of excessive bruising, pain, redness, swelling, discharge or foul odor noted  Patient declined any recent history of fever, chills, pain or warmth locally       Results Review:     ____________________________________________________________________________________________________________________________________________  Health maintenance and recommendations    Low salt/ HTN/ Heart healthy carbohydrate restricted cardiac diet   The patient is advised to reduce or avoid caffeine or other cardiac stimulants.   Minimize or avoid  NSAID-type medications      Monitor for any signs of bleeding including red or dark stools. Fall precautions.   Advised staying uptodate with immunizations per established standard guidelines.    Offered to give patient  a copy of my notes     Questions were encouraged, asked and answered to the patient's  understanding and satisfaction. Questions if any regarding current medications and " side effects, need for refills and importance of compliance to medications stressed.    Reviewed available prior notes, consults, prior visits, laboratory findings, radiology and cardiology relevant reports. Updated chart as applicable. I have reviewed the patient's medical history in detail and updated the computerized patient record as relevant.      Updated patient regarding any new or relevant abnormalities on review of records or any new findings on physical exam. Mentioned to patient about purpose of visit and desirable health short and long term goals and objectives.    Primary to monitor CBC CMP Lipid panel and TSH as applicable    ___________________________________________________________________________________________________________________________________________     ECG 12 Lead    Date/Time: 8/18/2021 10:22 AM  Performed by: Edinson Hardy MD  Authorized by: Edinson Hardy MD   Comparison: not compared with previous ECG   Rhythm: sinus rhythm  Rate: normal  Conduction: conduction normal  ST Segments: ST segments normal  T Waves: T waves normal  QRS axis: normal  Other findings: non-specific ST-T wave changes    Clinical impression: abnormal EKG            Assessment/Plan   Diagnoses and all orders for this visit:    1. S/P CABG (coronary artery bypass graft) (Primary)  -     Ambulatory Referral to Cardiac Rehab  -     Adult Transthoracic Echo Complete w/ Color, Spectral and Contrast if necessary per protocol; Future    2. Coronary artery disease involving coronary bypass graft of native heart without angina pectoris  -     Adult Transthoracic Echo Complete w/ Color, Spectral and Contrast if necessary per protocol; Future    3. S/P CABG (coronary artery bypass graft) x 2 [facility] 07/2021  -     Ambulatory Referral to Cardiac Rehab  -     Adult Transthoracic Echo Complete w/ Color, Spectral and Contrast if necessary per protocol; Future    4. Ex-smoker    5. Family history of early  CAD    Other orders  -     ECG 12 Lead          Plan      Orders Placed This Encounter   Procedures   • Ambulatory Referral to Cardiac Rehab     Referral Priority:   Routine     Referral Type:   Rehabilitation - Outpatient     Number of Visits Requested:   1   • ECG 12 Lead     This order was created via procedure documentation     Order Specific Question:   Release to patient     Answer:   Immediate   • Adult Transthoracic Echo Complete w/ Color, Spectral and Contrast if necessary per protocol     Myocardial strain to be performed during echocardiogram as long as technically feasible     Standing Status:   Future     Standing Expiration Date:   8/18/2022     Order Specific Question:   Reason for exam?     Answer:   Dyspnea     Order Specific Question:   Release to patient     Answer:   Immediate        Keep LDL below 70 mg/dl. Monitor liver and renal functions.   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

## 2021-08-19 ENCOUNTER — PATIENT ROUNDING (BHMG ONLY) (OUTPATIENT)
Dept: CARDIOLOGY | Facility: CLINIC | Age: 56
End: 2021-08-19

## 2021-08-19 NOTE — PROGRESS NOTES
August 19, 2021    Hello, may I speak with Magalie LYNCH Guess?    My name is Aury Draper.     I am  with [id number]  [facility]  [address]  [address]  [facility] [address] [phone].    Before we get started may I verify your date of birth? 1965    I am calling to officially welcome you to our practice and ask about your recent visit. Is this a good time to talk? yes    Tell me about your visit with us. What things went well?  He said he has never seen a cardiologist before but thought the visit was great. Everyone was nice & very professional.        We're always looking for ways to make our patients' experiences even better. Do you have recommendations on ways we may improve?  no    Overall were you satisfied with your first visit to our practice? yes       I appreciate you taking the time to speak with me today. Is there anything else I can do for you? no      Thank you, and have a great day.

## 2021-11-15 ENCOUNTER — TELEPHONE (OUTPATIENT)
Dept: FAMILY MEDICINE CLINIC | Facility: CLINIC | Age: 56
End: 2021-11-15

## 2021-11-22 ENCOUNTER — HOSPITAL ENCOUNTER (EMERGENCY)
Age: 56
Discharge: HOME | End: 2021-11-22

## 2021-11-22 VITALS
HEIGHT: 68 IN | SYSTOLIC BLOOD PRESSURE: 134 MMHG | RESPIRATION RATE: 18 BRPM | OXYGEN SATURATION: 98 % | BODY MASS INDEX: 22.88 KG/M2 | HEART RATE: 101 BPM | WEIGHT: 151 LBS | TEMPERATURE: 98.2 F | DIASTOLIC BLOOD PRESSURE: 88 MMHG

## 2021-11-22 DIAGNOSIS — M54.42 CHRONIC MIDLINE LOW BACK PAIN WITH LEFT-SIDED SCIATICA: Primary | ICD-10-CM

## 2021-11-22 DIAGNOSIS — G89.29 CHRONIC MIDLINE LOW BACK PAIN WITH LEFT-SIDED SCIATICA: Primary | ICD-10-CM

## 2021-11-22 PROCEDURE — 6370000000 HC RX 637 (ALT 250 FOR IP): Performed by: PHYSICIAN ASSISTANT

## 2021-11-22 PROCEDURE — 99282 EMERGENCY DEPT VISIT SF MDM: CPT

## 2021-11-22 RX ORDER — LIDOCAINE 50 MG/G
1 PATCH TOPICAL EVERY 24 HOURS
Qty: 30 PATCH | Refills: 0 | Status: SHIPPED | OUTPATIENT
Start: 2021-11-22 | End: 2021-12-22

## 2021-11-22 RX ORDER — HYDROCODONE BITARTRATE AND ACETAMINOPHEN 5; 325 MG/1; MG/1
1 TABLET ORAL ONCE
Status: COMPLETED | OUTPATIENT
Start: 2021-11-22 | End: 2021-11-22

## 2021-11-22 RX ORDER — METHOCARBAMOL 500 MG/1
500 TABLET, FILM COATED ORAL 4 TIMES DAILY PRN
Qty: 20 TABLET | Refills: 0 | Status: SHIPPED | OUTPATIENT
Start: 2021-11-22 | End: 2021-11-27

## 2021-11-22 RX ORDER — METHYLPREDNISOLONE 4 MG/1
TABLET ORAL
Qty: 1 KIT | Refills: 0 | Status: SHIPPED | OUTPATIENT
Start: 2021-11-22 | End: 2021-11-28

## 2021-11-22 RX ADMIN — HYDROCODONE BITARTRATE AND ACETAMINOPHEN 1 TABLET: 5; 325 TABLET ORAL at 17:37

## 2021-11-22 ASSESSMENT — PAIN SCALES - GENERAL
PAINLEVEL_OUTOF10: 9
PAINLEVEL_OUTOF10: 9

## 2021-11-22 ASSESSMENT — ENCOUNTER SYMPTOMS
ABDOMINAL PAIN: 0
SHORTNESS OF BREATH: 0
BACK PAIN: 1

## 2021-11-22 NOTE — ED PROVIDER NOTES
[facility]  eMERGENCY dEPARTMENT eNCOUnter      Pt Name: Bautista Louis  MRN: [id number]  [place] 1965  Date of evaluation: 11/22/2021  Provider: Vinicio Roland Dr       Chief Complaint   Patient presents with    Back Pain         HISTORY OF PRESENT ILLNESS   (Location/Symptom, Timing/Onset,Context/Setting, Quality, Duration, Modifying Factors, Severity)  Note limiting factors. Bautista Louis is a 64 y.o. male with history of 2 back surgeries including L5/S1 fusion who presents to the emergency department with complaint of back pain. The patient states that he has been working the last few days and feels that he is overdone it and strained his back. He does have a history of similar incidents. He states that he does live in [place] for most of the year. He has a doctor there who usually give 7.5 Norco for his back pain. The patient denies any abdominal pain, bowel or bladder dysfunction, saddle anesthesia, or change in his chronic left-sided radiculopathy. Denies any weakness of his lower extremities. HPI    NursingNotes were reviewed. REVIEW OF SYSTEMS    (2-9 systems for level 4, 10 or more for level 5)     Review of Systems   Constitutional: Negative for chills and fever. Respiratory: Negative for shortness of breath. Cardiovascular: Negative for chest pain. Gastrointestinal: Negative for abdominal pain. Musculoskeletal: Positive for back pain. Neurological: Negative for weakness and numbness (No change from chronic left sided sciatica). All other systems reviewed and are negative.            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)     Prostate    GERD (gastroesophageal reflux disease)          SURGICAL HISTORY       Past Surgical History:   Procedure Laterality Date    ANKLE FRACTURE SURGERY Left 7/15/2019    OPEN REDUCTION INTERNAL FIXATION LEFT DISTAL FIBULA WITH POSSIBLE SYNDESMOTIC FIXATION performed [facility] Bri Scott [facility] at [facility]      PROSTRATE ULTRASOUND GUIDANCE      SPINAL FUSION           CURRENT MEDICATIONS     Previous Medications    FAMOTIDINE (PEPCID) 20 MG TABLET    Take 1 tablet by mouth 2 times daily    IBUPROFEN (IBU) 800 MG TABLET    Take 1 tablet by mouth every 6 hours as needed for Pain    NAPROXEN (NAPROSYN) 500 MG TABLET    Take 1 tablet by mouth 2 times daily (with meals) for 10 days       ALLERGIES     Patient has no known allergies. FAMILY HISTORY     History reviewed. No pertinent family history. SOCIAL HISTORY       Social History     Socioeconomic History    Marital status: Legally      Spouse name: None    Number of children: None    Years of education: None    Highest education level: None   Occupational History    None   Tobacco Use    Smoking status: Current Every Day Smoker     Packs/day: 1.00     Types: Cigarettes    Smokeless tobacco: Never Used   Vaping Use    Vaping Use: Never used   Substance and Sexual Activity    Alcohol use: No    Drug use: No    Sexual activity: None   Other Topics Concern    None   Social History Narrative    None     Social Determinants of Health     Financial Resource Strain:     Difficulty of Paying Living Expenses: Not on file   Food Insecurity:     Worried About Running Out of Food in the Last Year: Not on file    Linda of Food in the Last Year: Not on file   Transportation Needs:     Lack of Transportation (Medical): Not on file    Lack of Transportation (Non-Medical):  Not on file   Physical Activity:     Days of Exercise per Week: Not on file    Minutes of Exercise per Session: Not on file   Stress:     Feeling of Stress : Not on file   Social Connections:     Frequency of Communication with Friends and Family: Not on file    Frequency of Social Gatherings with Friends and Family: Not on file    Attends Orthodox Services: Not on file    Active Member of Clubs or Organizations: Not on file    Attends Club or Organization Meetings: Not on file    Marital Status: Not on file   Intimate Partner Violence:     Fear of Current or Ex-Partner: Not on file    Emotionally Abused: Not on file    Physically Abused: Not on file    Sexually Abused: Not on file   Housing Stability:     Unable to Pay for Housing in the Last Year: Not on file    Number of [place] in the Last Year: Not on file    Unstable Housing in the Last Year: Not on file       SCREENINGS             PHYSICAL EXAM    (up to 7 for level 4, 8 or more for level 5)     ED Triage Vitals [11/22/21 1700]   BP Temp Temp src Pulse Resp SpO2 Height Weight   136/86 98.2 °F (36.8 °C) -- 103 18 97 % 5' 8\" (1.727 m) 151 lb (68.5 kg)       Physical Exam  Vitals and nursing note reviewed. Constitutional:       General: He is not in acute distress. Appearance: Normal appearance. He is normal weight. He is not ill-appearing or toxic-appearing. Pulmonary:      Effort: Pulmonary effort is normal. No respiratory distress. Abdominal:      General: There is no distension. Palpations: Abdomen is soft. There is no mass. Tenderness: There is no abdominal tenderness. Musculoskeletal:         General: No swelling, deformity or signs of injury. Cervical back: Normal range of motion and neck supple. Thoracic back: Normal. No swelling, edema, deformity, signs of trauma, tenderness or bony tenderness. Normal range of motion. Lumbar back: Tenderness present. No swelling, deformity or spasms. Normal range of motion. Positive right straight leg raise test. Negative left straight leg raise test.      Comments: Diffuse lumbar tenderness   Neurological:      General: No focal deficit present. Mental Status: He is alert and oriented to person, place, and time. Mental status is at baseline. Sensory: No sensory deficit.       Motor: No weakness. Gait: Gait normal.   Psychiatric:         Behavior: Behavior normal.         DIAGNOSTIC RESULTS     RADIOLOGY:  Non-plain film images such as CT, Ultrasound and MRI are read by the radiologist. Plain radiographic images are visualized and preliminarily interpreted bythe emergency physician with the below findings:    No orders to display     LABS:  Labs Reviewed - No data to display    All other labs were within normal range or not returned as of this dictation. EMERGENCY DEPARTMENT COURSE and DIFFERENTIAL DIAGNOSIS/MDM:   Vitals:    Vitals:    11/22/21 1700   BP: 136/86   Pulse: 103   Resp: 18   Temp: 98.2 °F (36.8 °C)   SpO2: 97%   Weight: 151 lb (68.5 kg)   Height: 5' 8\" (1.727 m)       MDM  Patient is a 51-year-old male with history of chronic back pain who presents to the ER asking for narcotic medication. His vital signs are within normal limits. On physical exam, he does have diffuse lumbar tenderness including bilateral paraspinal tenderness. He has no new or recent injury, significant reproducible midline vertebral tenderness, decrease in range of motion, or neuro deficit that would warrant imaging at this time. He has no red flags of back pain concerning for possible cauda equina warranting imaging at this time. The patient does obtain narcotic medication from his primary care in [place]. I have discussed the patient's case with attending who does not feel comfortable with narcotic medication outpatient. I am agreeable to this plan. The patient will be given a Medrol Dosepak, muscle relaxers, and topical lidocaine patches outpatient for pain in his back. I also stressed that he should follow-up with a primary care provider or clinic in the area as he will still be here for a few weeks. Return precautions were given to him and he verbalized understanding. All his questions were answered. He is agreeable to this plan. FINAL IMPRESSION      1.  Chronic midline low back pain with left-sided sciatica          DISPOSITION/PLAN   DISPOSITION Discharge - Pending Orders Complete 11/22/2021 05:24:18 PM      PATIENT REFERRED TO:  Nabila Jones MD  [address] [facility] [address] [id number]  [address]Augustine granadosVanderbilt University Hospital  [phone]    Schedule an appointment as soon as possible for a visit in 3 days  Follow up within 3 days, Return to ED sooner if symptoms worsen      DISCHARGE MEDICATIONS:  New Prescriptions    LIDOCAINE (LIDODERM) 5 %    Place 1 patch onto the skin every 24 hours Place 1 patch onto the skin daily 12 hours on, 12 hours off. METHOCARBAMOL (ROBAXIN) 500 MG TABLET    Take 1 tablet by mouth 4 times daily as needed (Muscle spasms)    METHYLPREDNISOLONE (MEDROL DOSEPACK) 4 MG TABLET    Take by mouth.           (Please note that portions of this note were completed with a voice recognition program.  Efforts were made to edit thedictations but occasionally words are mis-transcribed.)    KIMBER Mcknight (electronically signed)     Dick Mcknight  11/22/21 [id number]

## 2022-01-25 NOTE — TELEPHONE ENCOUNTER
>?  
I called [facility] and they said they have the referral and have tried to reach patient but no call back.   
Im going to refer him to pain management center in [place], [place]. They said they do accept his insurance im going to fax them all his info but they said it could take a few weeks to be able to get him in.   
No meds  
Once we know where he is going and when  
Patient called requesting a refill on norco be sent to [facility].  
Patient called this morning and stated that the pain management place I sent referral to does not accept his insurance. Im going to try sending it to another place in [place] but he is wanting to know if he can have a refill since we have to refer him somewhere else.  
Where are we with refeerral?  
noted  
sarah beth I see a referral do u know how long it usually takes for them to make the appt   
verbal instruction

## 2022-04-15 DIAGNOSIS — G89.29 CHRONIC RIGHT-SIDED LOW BACK PAIN WITH RIGHT-SIDED SCIATICA: ICD-10-CM

## 2022-04-15 DIAGNOSIS — M54.41 CHRONIC RIGHT-SIDED LOW BACK PAIN WITH RIGHT-SIDED SCIATICA: ICD-10-CM

## 2022-04-15 DIAGNOSIS — K21.00 GASTROESOPHAGEAL REFLUX DISEASE WITH ESOPHAGITIS WITHOUT HEMORRHAGE: ICD-10-CM

## 2022-04-15 DIAGNOSIS — N28.89 RENAL MASS: Primary | ICD-10-CM

## 2022-04-16 LAB
ALBUMIN SERPL-MCNC: 4.6 G/DL (ref 3.8–4.9)
ALBUMIN/GLOB SERPL: 1.6 {RATIO} (ref 1.2–2.2)
ALP SERPL-CCNC: 127 IU/L (ref 44–121)
ALT SERPL-CCNC: 22 IU/L (ref 0–44)
AST SERPL-CCNC: 20 IU/L (ref 0–40)
BASOPHILS # BLD AUTO: 0 X10E3/UL (ref 0–0.2)
BASOPHILS NFR BLD AUTO: 0 %
BILIRUB SERPL-MCNC: 0.5 MG/DL (ref 0–1.2)
BUN SERPL-MCNC: 7 MG/DL (ref 6–24)
BUN/CREAT SERPL: 9 (ref 9–20)
CALCIUM SERPL-MCNC: 9.6 MG/DL (ref 8.7–10.2)
CHLORIDE SERPL-SCNC: 94 MMOL/L (ref 96–106)
CHOLEST SERPL-MCNC: 283 MG/DL (ref 100–199)
CHOLEST/HDLC SERPL: 9.1 RATIO (ref 0–5)
CO2 SERPL-SCNC: 21 MMOL/L (ref 20–29)
CREAT SERPL-MCNC: 0.75 MG/DL (ref 0.76–1.27)
EGFRCR SERPLBLD CKD-EPI 2021: 106 ML/MIN/1.73
EOSINOPHIL # BLD AUTO: 0.1 X10E3/UL (ref 0–0.4)
EOSINOPHIL NFR BLD AUTO: 1 %
ERYTHROCYTE [DISTWIDTH] IN BLOOD BY AUTOMATED COUNT: 13.4 % (ref 11.6–15.4)
GLOBULIN SER CALC-MCNC: 2.9 G/DL (ref 1.5–4.5)
GLUCOSE SERPL-MCNC: 102 MG/DL (ref 65–99)
HCT VFR BLD AUTO: 47.5 % (ref 37.5–51)
HDLC SERPL-MCNC: 31 MG/DL
HGB BLD-MCNC: 16.8 G/DL (ref 13–17.7)
IMM GRANULOCYTES # BLD AUTO: 0 X10E3/UL (ref 0–0.1)
IMM GRANULOCYTES NFR BLD AUTO: 0 %
LDLC SERPL CALC-MCNC: 204 MG/DL (ref 0–99)
LYMPHOCYTES # BLD AUTO: 2.6 X10E3/UL (ref 0.7–3.1)
LYMPHOCYTES NFR BLD AUTO: 27 %
MCH RBC QN AUTO: 30.5 PG (ref 26.6–33)
MCHC RBC AUTO-ENTMCNC: 35.4 G/DL (ref 31.5–35.7)
MCV RBC AUTO: 86 FL (ref 79–97)
MONOCYTES # BLD AUTO: 0.5 X10E3/UL (ref 0.1–0.9)
MONOCYTES NFR BLD AUTO: 6 %
NEUTROPHILS # BLD AUTO: 6.2 X10E3/UL (ref 1.4–7)
NEUTROPHILS NFR BLD AUTO: 66 %
PLATELET # BLD AUTO: 210 X10E3/UL (ref 150–450)
POTASSIUM SERPL-SCNC: 4.4 MMOL/L (ref 3.5–5.2)
PROT SERPL-MCNC: 7.5 G/DL (ref 6–8.5)
RBC # BLD AUTO: 5.5 X10E6/UL (ref 4.14–5.8)
SODIUM SERPL-SCNC: 130 MMOL/L (ref 134–144)
TRIGL SERPL-MCNC: 240 MG/DL (ref 0–149)
VLDLC SERPL CALC-MCNC: 48 MG/DL (ref 5–40)
WBC # BLD AUTO: 9.3 X10E3/UL (ref 3.4–10.8)

## 2022-04-18 RX ORDER — PRAVASTATIN SODIUM 40 MG
40 TABLET ORAL NIGHTLY
Qty: 30 TABLET | Refills: 5 | Status: SHIPPED | OUTPATIENT
Start: 2022-04-18 | End: 2022-05-19

## 2022-05-02 RX ORDER — FENOFIBRATE 160 MG/1
TABLET ORAL
Qty: 90 TABLET | Refills: 0 | Status: SHIPPED | OUTPATIENT
Start: 2022-05-02 | End: 2022-08-02

## 2022-05-02 RX ORDER — ASPIRIN 81 MG/1
TABLET, DELAYED RELEASE ORAL
Qty: 90 TABLET | Refills: 2 | Status: SHIPPED | OUTPATIENT
Start: 2022-05-02

## 2022-05-18 NOTE — PROGRESS NOTES
Subjective:     Encounter Date: 05/19/2022      Patient ID: Magalie Myers is a 56 y.o. male with coronary artery disease s/p CABG x 2 in [place] 07/2021 (LIMA to LAD and SVG to obtuse marginal branch), Ischemic cardiomyopathy with LVEF 45% at time of CABG, hypertension, hyperlipidemia and smoker    Chief Complaint:   Coronary Artery Disease  Presents for follow-up visit. Symptoms include chest pain and shortness of breath. Pertinent negatives include no chest pressure, chest tightness, dizziness, leg swelling, muscle weakness, palpitations or weight gain. The symptoms have been worsening. Compliance with diet is good. Compliance with exercise is good. Compliance with medications is good.     Patient presents today for management of coronary artery disease. Patient was seen to establish care after CABG in 8/2021, he had an echo ordered at that visit but it was never completed. Patient has had several no show and cancelled appointments since. He returns today with concerns of chest pain and bilateral arm pain. Patient reports that he has started to experience bilateral arm pain about his elbows that has been worse over the past 1-2 months. He states that he will notice it when he is working. He reports that some days it has been on and off throughout the day. He states that it is a nagging pain, pain has not been severe enough to make him stop working. He reports some occasional chest pain that occurs at rest , he states that it is located on left side and lasts a few minutes before resolving on its own. He reports that he has had shortness of breath at both rest and with moderate exertion. Patient reports that he has a lot of fatigue as well.  Patient states some occasional heart racing at times. He denies any dizziness, or near syncope. He denies any orthopnea, leg swelling or PND. He reports that he has a lot of bruising and is an easy bleeder as well. Patient follows with Dr Robertson as PCP.     The following  portions of the patient's history were reviewed and updated as appropriate: allergies, current medications, past family history, past medical history, past social history, past surgical history and problem list.    Allergies   Allergen Reactions   • Atorvastatin Myalgia       Current Outpatient Medications:   •  acetaminophen (TYLENOL) 500 MG tablet, Take 1,000 mg by mouth., Disp: , Rfl:   •  clopidogrel (PLAVIX) 75 MG tablet, Take 75 mg by mouth Daily., Disp: , Rfl:   •  esomeprazole (nexIUM) 20 MG capsule, Take 40 mg by mouth Daily., Disp: , Rfl:   •  fenofibrate 160 MG tablet, TAKE ONE TABLET DAILY, Disp: 90 tablet, Rfl: 0  •  HM Aspirin EC Low Dose 81 MG EC tablet, TAKE ONE TABLET DAILY, Disp: 90 tablet, Rfl: 2  •  HYDROcodone-acetaminophen (Norco) 7.5-325 MG per tablet, Take 1 tablet by mouth Every 6 (Six) Hours As Needed for Moderate Pain ., Disp: 68 tablet, Rfl: 0  •  metoprolol tartrate (LOPRESSOR) 25 MG tablet, TAKE ONE TABLET TWICE DAILY AT 6AM AND 6PM GENERIC FOR LOPRESSOR, Disp: 90 tablet, Rfl: 2  •  Naproxen Sodium (ALEVE PO), Take  by mouth As Needed., Disp: , Rfl:   •  rosuvastatin (CRESTOR) 20 MG tablet, Take 1 tablet by mouth Daily., Disp: 30 tablet, Rfl: 11  Past Medical History:   Diagnosis Date   • Coronary artery disease    • GERD (gastroesophageal reflux disease)    • Injury of back    • Myocardial infarction (HCC)    • Prostate CA (HCC)    • TB (tuberculosis)     50 years ago     Social History     Socioeconomic History   • Marital status:    Tobacco Use   • Smoking status: Current Every Day Smoker     Packs/day: 0.25     Types: Cigarettes   • Smokeless tobacco: Never Used   • Tobacco comment: quit 7/8/21   Substance and Sexual Activity   • Alcohol use: No   • Drug use: No   • Sexual activity: Defer       Review of Systems   Constitutional: Positive for malaise/fatigue. Negative for weight gain and weight loss.   HENT: Negative for nosebleeds.    Cardiovascular: Positive for chest pain  "and dyspnea on exertion. Negative for irregular heartbeat, leg swelling, near-syncope, orthopnea, palpitations, paroxysmal nocturnal dyspnea and syncope.   Respiratory: Positive for shortness of breath. Negative for chest tightness.    Hematologic/Lymphatic: Bruises/bleeds easily.   Musculoskeletal: Negative for muscle weakness.   Genitourinary: Negative for hematuria.   Neurological: Negative for dizziness and weakness.   All other systems reviewed and are negative.         Objective:     Vitals reviewed.   Constitutional:       General: Not in acute distress.     Appearance: Normal appearance. Well-developed.   Eyes:      Pupils: Pupils are equal, round, and reactive to light.   HENT:      Head: Normocephalic and atraumatic.      Nose: Nose normal.   Neck:      Vascular: No carotid bruit.   Pulmonary:      Effort: Pulmonary effort is normal. No respiratory distress.      Breath sounds: Normal breath sounds. No wheezing. No rales.   Cardiovascular:      Normal rate. Regular rhythm.      Murmurs: There is no murmur.   Edema:     Peripheral edema absent.   Abdominal:      General: There is no distension.      Palpations: Abdomen is soft.   Musculoskeletal: Normal range of motion.      Cervical back: Normal range of motion and neck supple. Skin:     General: Skin is warm.      Findings: No erythema or rash.   Neurological:      General: No focal deficit present.      Mental Status: Alert and oriented to person, place, and time.   Psychiatric:         Attention and Perception: Attention normal.         Mood and Affect: Mood normal.         Speech: Speech normal.         Behavior: Behavior normal.         Thought Content: Thought content normal.         Judgment: Judgment normal.         /82   Pulse 63   Ht 172.7 cm (68\")   Wt 73 kg (161 lb)   SpO2 99%   BMI 24.48 kg/m²       ECG 12 Lead    Date/Time: 5/19/2022 9:41 AM  Performed by: Stephane Head APRN  Authorized by: Stephane Head APRN   Comparison: " compared with previous ECG from 8/18/2021  Similar to previous ECG  Rhythm: sinus rhythm  Rate: normal  BPM: 63  Conduction: incomplete right bundle branch block            Lab Review:       Lab Results   Component Value Date    CHLPL 283 (H) 04/15/2022    TRIG 240 (H) 04/15/2022    HDL 31 (L) 04/15/2022     (H) 04/15/2022     I have personally reviewed labs and past office notes prior to patients visit  Assessment:          Diagnosis Plan   1. Coronary artery disease involving coronary bypass graft of native heart without angina pectoris  Stress Test With Myocardial Perfusion (1 Day)    ECG 12 Lead   2. S/P CABG (coronary artery bypass graft) x 2 [facility] 07/2021     3. Mixed hyperlipidemia     4. Smoker            Plan:       1. CAD: s/p CABG x 2 in [place] 07/2021 (LIMA to LAD and SVG to obtuse marginal branch). Patient reports bilateral arm pain that is similar to what he experienced prior to his CABG. Ordering a nuclear stress test for further evaluation. Continue aspirin, plavix, and metoprolol.     2. CABG: x 2 in [place] 07/2021 (LIMA to LAD and SVG to obtuse marginal branch),    3. Hypertension    4. Hyperlipidemia: LDL uncontrolled at 204 on recent lipid panel from 4/15/2022. Patient reports leg cramps with atorvastatin. Will try rosuvastatin. Patient is to notify office of any problems.     5. Tobacco abuse:  Magalie Myers  reports that he has been smoking cigarettes. He has been smoking about 0.25 packs per day. He has never used smokeless tobacco.. I have educated him on the risk of diseases from using tobacco products such as cancer, COPD and heart disease. I advised him to quit and he is not willing to quit. I spent 3  minutes counseling the patient.     Patient is to follow up in office in 4-6 weeks after nuclear stress test

## 2022-05-19 ENCOUNTER — OFFICE VISIT (OUTPATIENT)
Dept: CARDIOLOGY | Facility: CLINIC | Age: 57
End: 2022-05-19

## 2022-05-19 VITALS
OXYGEN SATURATION: 99 % | BODY MASS INDEX: 24.4 KG/M2 | DIASTOLIC BLOOD PRESSURE: 82 MMHG | WEIGHT: 161 LBS | SYSTOLIC BLOOD PRESSURE: 130 MMHG | HEIGHT: 68 IN | HEART RATE: 63 BPM

## 2022-05-19 DIAGNOSIS — F17.200 SMOKER: ICD-10-CM

## 2022-05-19 DIAGNOSIS — Z95.1 S/P CABG (CORONARY ARTERY BYPASS GRAFT): ICD-10-CM

## 2022-05-19 DIAGNOSIS — I25.810 CORONARY ARTERY DISEASE INVOLVING CORONARY BYPASS GRAFT OF NATIVE HEART WITHOUT ANGINA PECTORIS: Primary | ICD-10-CM

## 2022-05-19 DIAGNOSIS — E78.2 MIXED HYPERLIPIDEMIA: ICD-10-CM

## 2022-05-19 PROCEDURE — 93000 ELECTROCARDIOGRAM COMPLETE: CPT | Performed by: NURSE PRACTITIONER

## 2022-05-19 PROCEDURE — 99214 OFFICE O/P EST MOD 30 MIN: CPT | Performed by: NURSE PRACTITIONER

## 2022-05-19 RX ORDER — ROSUVASTATIN CALCIUM 20 MG/1
20 TABLET, COATED ORAL DAILY
Qty: 30 TABLET | Refills: 11 | Status: SHIPPED | OUTPATIENT
Start: 2022-05-19 | End: 2022-11-28

## 2022-05-19 RX ORDER — ROSUVASTATIN CALCIUM 20 MG/1
40 TABLET, COATED ORAL DAILY
Qty: 30 TABLET | Refills: 11 | Status: SHIPPED | OUTPATIENT
Start: 2022-05-19 | End: 2022-05-19 | Stop reason: SDUPTHER

## 2022-05-31 ENCOUNTER — HOSPITAL ENCOUNTER (EMERGENCY)
Facility: HOSPITAL | Age: 57
Discharge: LEFT BEFORE DISCHARGE | End: 2022-05-31

## 2022-05-31 ENCOUNTER — TELEPHONE (OUTPATIENT)
Dept: CARDIOLOGY | Facility: CLINIC | Age: 57
End: 2022-05-31

## 2022-05-31 ENCOUNTER — APPOINTMENT (OUTPATIENT)
Dept: GENERAL RADIOLOGY | Facility: HOSPITAL | Age: 57
End: 2022-05-31

## 2022-05-31 VITALS
RESPIRATION RATE: 20 BRPM | HEART RATE: 80 BPM | HEIGHT: 68 IN | DIASTOLIC BLOOD PRESSURE: 83 MMHG | TEMPERATURE: 98.3 F | WEIGHT: 160 LBS | SYSTOLIC BLOOD PRESSURE: 163 MMHG | OXYGEN SATURATION: 100 % | BODY MASS INDEX: 24.25 KG/M2

## 2022-05-31 PROCEDURE — 99211 OFF/OP EST MAY X REQ PHY/QHP: CPT

## 2022-05-31 PROCEDURE — 93005 ELECTROCARDIOGRAM TRACING: CPT

## 2022-05-31 PROCEDURE — 93005 ELECTROCARDIOGRAM TRACING: CPT | Performed by: EMERGENCY MEDICINE

## 2022-05-31 PROCEDURE — 93010 ELECTROCARDIOGRAM REPORT: CPT | Performed by: EMERGENCY MEDICINE

## 2022-05-31 RX ORDER — ASPIRIN 81 MG/1
324 TABLET, CHEWABLE ORAL ONCE
Status: DISCONTINUED | OUTPATIENT
Start: 2022-05-31 | End: 2022-05-31 | Stop reason: HOSPADM

## 2022-05-31 RX ORDER — SODIUM CHLORIDE 0.9 % (FLUSH) 0.9 %
10 SYRINGE (ML) INJECTION AS NEEDED
Status: DISCONTINUED | OUTPATIENT
Start: 2022-05-31 | End: 2022-05-31 | Stop reason: HOSPADM

## 2022-05-31 NOTE — TELEPHONE ENCOUNTER
Call received from patient stating c/o CP, nausea, lightheadedness, indigestion, over not feeling well. States he mowed the yard yesterday morning before it got hot and had to go inside with profuse sweating and ill feeling. Patient states he did not seek medical attention because his BP was WNL. I have advised patient to be evaluated at [facility].

## 2022-06-01 LAB
QT INTERVAL: 370 MS
QTC INTERVAL: 418 MS

## 2022-06-29 RX ORDER — CLOPIDOGREL BISULFATE 75 MG/1
TABLET ORAL
Qty: 90 TABLET | Refills: 2 | OUTPATIENT
Start: 2022-06-29

## 2022-06-29 RX ORDER — FENOFIBRATE 160 MG/1
TABLET ORAL
Qty: 90 TABLET | Refills: 0 | OUTPATIENT
Start: 2022-06-29

## 2022-07-01 ENCOUNTER — HOSPITAL ENCOUNTER (OUTPATIENT)
Dept: CARDIOLOGY | Facility: HOSPITAL | Age: 57
Discharge: HOME | End: 2022-07-01

## 2022-07-01 VITALS
WEIGHT: 160.94 LBS | DIASTOLIC BLOOD PRESSURE: 71 MMHG | HEART RATE: 58 BPM | BODY MASS INDEX: 24.39 KG/M2 | HEIGHT: 68 IN | SYSTOLIC BLOOD PRESSURE: 115 MMHG

## 2022-07-01 DIAGNOSIS — I25.810 CORONARY ARTERY DISEASE INVOLVING CORONARY BYPASS GRAFT OF NATIVE HEART WITHOUT ANGINA PECTORIS: ICD-10-CM

## 2022-07-01 LAB
BH CV NUCLEAR PRIOR STUDY: 3
BH CV REST NUCLEAR ISOTOPE DOSE: 10.2 MCI
BH CV STRESS BP STAGE 1: NORMAL
BH CV STRESS COMMENTS STAGE 1: NORMAL
BH CV STRESS DOSE REGADENOSON STAGE 1: 0.4
BH CV STRESS DURATION MIN STAGE 1: 0
BH CV STRESS DURATION SEC STAGE 1: 10
BH CV STRESS HR STAGE 1: 86
BH CV STRESS NUCLEAR ISOTOPE DOSE: 33.6 MCI
BH CV STRESS PROTOCOL 1: NORMAL
BH CV STRESS RECOVERY BP: NORMAL MMHG
BH CV STRESS RECOVERY HR: 70 BPM
BH CV STRESS STAGE 1: 1
LV EF NUC BP: 58 %
MAXIMAL PREDICTED HEART RATE: 164 BPM
PERCENT MAX PREDICTED HR: 52.44 %
STRESS BASELINE BP: NORMAL MMHG
STRESS BASELINE HR: 60 BPM
STRESS PERCENT HR: 62 %
STRESS POST EXERCISE DUR MIN: 0 MIN
STRESS POST EXERCISE DUR SEC: 10 SEC
STRESS POST PEAK BP: NORMAL MMHG
STRESS POST PEAK HR: 86 BPM
STRESS TARGET HR: 139 BPM

## 2022-07-01 PROCEDURE — 93018 CV STRESS TEST I&R ONLY: CPT | Performed by: INTERNAL MEDICINE

## 2022-07-01 PROCEDURE — 25010000002 REGADENOSON 0.4 MG/5ML SOLUTION: Performed by: INTERNAL MEDICINE

## 2022-07-01 PROCEDURE — 0 TECHNETIUM SESTAMIBI: Performed by: NURSE PRACTITIONER

## 2022-07-01 PROCEDURE — A9500 TC99M SESTAMIBI: HCPCS | Performed by: NURSE PRACTITIONER

## 2022-07-01 PROCEDURE — 78452 HT MUSCLE IMAGE SPECT MULT: CPT | Performed by: INTERNAL MEDICINE

## 2022-07-01 PROCEDURE — 93017 CV STRESS TEST TRACING ONLY: CPT

## 2022-07-01 PROCEDURE — 78452 HT MUSCLE IMAGE SPECT MULT: CPT

## 2022-07-01 RX ORDER — PRAVASTATIN SODIUM 20 MG
20 TABLET ORAL DAILY
COMMUNITY
End: 2022-11-28

## 2022-07-01 RX ADMIN — REGADENOSON 0.4 MG: 0.08 INJECTION, SOLUTION INTRAVENOUS at 08:36

## 2022-07-01 RX ADMIN — TECHNETIUM TC 99M SESTAMIBI 1 DOSE: 1 INJECTION INTRAVENOUS at 09:46

## 2022-07-01 RX ADMIN — TECHNETIUM TC 99M SESTAMIBI 1 DOSE: 1 INJECTION INTRAVENOUS at 07:41

## 2022-07-05 ENCOUNTER — OFFICE VISIT (OUTPATIENT)
Dept: CARDIOLOGY | Facility: CLINIC | Age: 57
End: 2022-07-05

## 2022-07-05 VITALS
HEART RATE: 65 BPM | HEIGHT: 68 IN | SYSTOLIC BLOOD PRESSURE: 116 MMHG | WEIGHT: 156 LBS | OXYGEN SATURATION: 98 % | BODY MASS INDEX: 23.64 KG/M2 | DIASTOLIC BLOOD PRESSURE: 76 MMHG

## 2022-07-05 DIAGNOSIS — I25.810 CORONARY ARTERY DISEASE INVOLVING CORONARY BYPASS GRAFT OF NATIVE HEART WITHOUT ANGINA PECTORIS: Primary | ICD-10-CM

## 2022-07-05 DIAGNOSIS — I10 PRIMARY HYPERTENSION: ICD-10-CM

## 2022-07-05 DIAGNOSIS — E78.2 MIXED HYPERLIPIDEMIA: ICD-10-CM

## 2022-07-05 DIAGNOSIS — R00.0 RACING HEART BEAT: ICD-10-CM

## 2022-07-05 DIAGNOSIS — R06.09 DYSPNEA ON EXERTION: ICD-10-CM

## 2022-07-05 DIAGNOSIS — F17.200 SMOKER: ICD-10-CM

## 2022-07-05 DIAGNOSIS — Z95.1 S/P CABG (CORONARY ARTERY BYPASS GRAFT): ICD-10-CM

## 2022-07-05 PROCEDURE — 99214 OFFICE O/P EST MOD 30 MIN: CPT | Performed by: NURSE PRACTITIONER

## 2022-07-05 NOTE — PROGRESS NOTES
Subjective:     Encounter Date: 07/05/2022      Patient ID: Magalie Myers is a 56 y.o. male with coronary artery disease s/p CABG x 2 in [place] 07/2021 (LIMA to LAD and SVG to obtuse marginal branch), Ischemic cardiomyopathy with LVEF 45% at time of CABG, hypertension, hyperlipidemia and smoker    Chief Complaint: follow up  Coronary Artery Disease  Presents for follow-up visit. Symptoms include chest pain and shortness of breath. Pertinent negatives include no chest pressure, chest tightness, dizziness, leg swelling, muscle weakness, palpitations or weight gain. The symptoms have been worsening. Compliance with diet is good. Compliance with exercise is good. Compliance with medications is good.     Patient presents today for management of coronary artery disease. Patient was seen May 2022 for chest pain and bilateral arm pain.  Nuclear stress test was done on 7/01/2022 that was low risk for ischemia. He was also started on crestor at LOV. Today he reports that he has been about the same. He has still been experiencing shortness of breath from time to time. He denies any palpitations. He reports some heart racing at times. He reports that he has the heart racing at times with a sharp chest pain located on his left side. He has been taking the crestor with no difficulties. He reports that he has been monitoring his blood pressure and it has remained controlled running 115-120/70-80. He reports that he has some dizziness that occurs randomly; he reports this is chronic and unchanged. He reports that he recently started a new job and will be traveling back and forth to [place] quite a bit. Patient follows with Dr Robertson as PCP.     The following portions of the patient's history were reviewed and updated as appropriate: allergies, current medications, past family history, past medical history, past social history, past surgical history and problem list.    Allergies   Allergen Reactions   • Atorvastatin Myalgia        Current Outpatient Medications:   •  acetaminophen (TYLENOL) 500 MG tablet, Take 1,000 mg by mouth., Disp: , Rfl:   •  clopidogrel (PLAVIX) 75 MG tablet, Take 75 mg by mouth Daily., Disp: , Rfl:   •  esomeprazole (nexIUM) 20 MG capsule, Take 40 mg by mouth Daily., Disp: , Rfl:   •  fenofibrate 160 MG tablet, TAKE ONE TABLET DAILY, Disp: 90 tablet, Rfl: 0  •  HM Aspirin EC Low Dose 81 MG EC tablet, TAKE ONE TABLET DAILY, Disp: 90 tablet, Rfl: 2  •  HYDROcodone-acetaminophen (Norco) 7.5-325 MG per tablet, Take 1 tablet by mouth Every 6 (Six) Hours As Needed for Moderate Pain ., Disp: 68 tablet, Rfl: 0  •  metoprolol tartrate (LOPRESSOR) 25 MG tablet, TAKE ONE TABLET TWICE DAILY AT 6AM AND 6PM GENERIC FOR LOPRESSOR, Disp: 90 tablet, Rfl: 2  •  Naproxen Sodium (ALEVE PO), Take  by mouth As Needed., Disp: , Rfl:   •  pravastatin (PRAVACHOL) 20 MG tablet, Take 20 mg by mouth Daily., Disp: , Rfl:   •  rosuvastatin (CRESTOR) 20 MG tablet, Take 1 tablet by mouth Daily., Disp: 30 tablet, Rfl: 11  Past Medical History:   Diagnosis Date   • Coronary artery disease    • GERD (gastroesophageal reflux disease)    • Injury of back    • Myocardial infarction (HCC)    • Prostate CA (HCC)    • TB (tuberculosis)     50 years ago     Social History     Socioeconomic History   • Marital status:    Tobacco Use   • Smoking status: Current Every Day Smoker     Packs/day: 0.25     Types: Cigarettes   • Smokeless tobacco: Never Used   • Tobacco comment: quit 7/8/21   Vaping Use   • Vaping Use: Never used   Substance and Sexual Activity   • Alcohol use: No   • Drug use: No   • Sexual activity: Defer       Review of Systems   Constitutional: Negative for malaise/fatigue, weight gain and weight loss.   HENT: Negative for nosebleeds.    Cardiovascular: Positive for chest pain and dyspnea on exertion. Negative for irregular heartbeat, leg swelling, near-syncope, orthopnea, palpitations, paroxysmal nocturnal dyspnea and syncope.  "  Respiratory: Positive for shortness of breath. Negative for chest tightness.    Hematologic/Lymphatic: Bruises/bleeds easily.   Musculoskeletal: Negative for muscle weakness.   Genitourinary: Negative for hematuria.   Neurological: Negative for dizziness and weakness.   All other systems reviewed and are negative.         Objective:     Vitals reviewed.   Constitutional:       General: Not in acute distress.     Appearance: Normal appearance. Well-developed.   Eyes:      Pupils: Pupils are equal, round, and reactive to light.   HENT:      Head: Normocephalic and atraumatic.      Nose: Nose normal.   Neck:      Vascular: No carotid bruit.   Pulmonary:      Effort: Pulmonary effort is normal. No respiratory distress.      Breath sounds: Normal breath sounds. No wheezing. No rales.   Cardiovascular:      Normal rate. Regular rhythm.      Murmurs: There is no murmur.   Edema:     Peripheral edema absent.   Abdominal:      General: There is no distension.      Palpations: Abdomen is soft.   Musculoskeletal: Normal range of motion.      Cervical back: Normal range of motion and neck supple. Skin:     General: Skin is warm.      Findings: No erythema or rash.   Neurological:      General: No focal deficit present.      Mental Status: Alert and oriented to person, place, and time.   Psychiatric:         Attention and Perception: Attention normal.         Mood and Affect: Mood normal.         Speech: Speech normal.         Behavior: Behavior normal.         Thought Content: Thought content normal.         Judgment: Judgment normal.         /76   Pulse 65   Ht 172.7 cm (68\")   Wt 70.8 kg (156 lb)   SpO2 98%   BMI 23.72 kg/m²     Procedures    Lab Review:       Lab Results   Component Value Date    CHLPL 283 (H) 04/15/2022    TRIG 240 (H) 04/15/2022    HDL 31 (L) 04/15/2022     (H) 04/15/2022     Nuclear stress test 7/01/2022:  Interpretation Summary     · Diaphragmatic attenuation and GI artifacts are " present.  · Myocardial perfusion imaging indicates a normal myocardial perfusion study with no evidence of ischemia.  · Left ventricular ejection fraction is normal. (Calculated EF = 58%).  · Impressions are consistent with a low risk study.     I have personally reviewed labs, nuclear stress test and past office notes prior to patients visit  Assessment:          Diagnosis Plan   1. Coronary artery disease involving coronary bypass graft of native heart without angina pectoris  Adult Transthoracic Echo Complete w/ Color, Spectral and Contrast if necessary per protocol   2. S/P CABG (coronary artery bypass graft) x 2 [facility] 07/2021     3. Primary hypertension     4. Mixed hyperlipidemia     5. Smoker     6. Dyspnea on exertion  Adult Transthoracic Echo Complete w/ Color, Spectral and Contrast if necessary per protocol   7. Racing heart beat  Holter Monitor - 72 Hour Up To 15 Days          Plan:       1. CAD: s/p CABG x 2 in [place] 07/2021 (LIMA to LAD and SVG to obtuse marginal branch). Nuclear stress test 7/1/2022 was low risk for ischemia. Continue aspirin, plavix, metoprolol and rosuvastatin.     2. CABG: x 2 in [place] 07/2021 (LIMA to LAD and SVG to obtuse marginal branch)    3. Hypertension: well controlled. Continue current medications    4. Hyperlipidemia: LDL uncontrolled at 204 on recent lipid panel from 4/15/2022. Patient reports leg cramps with atorvastatin. Patient is tolerating crestor well. Recheck lipid panel at next appointment.     5. Tobacco abuse:  Magalie LYNCH Lucia  reports that he has been smoking cigarettes. He has been smoking about 0.25 packs per day. He has never used smokeless tobacco.. I have educated him on the risk of diseases from using tobacco products such as cancer, COPD and heart disease. I advised him to quit and he is not willing to quit. I spent 3  minutes counseling the patient.    6. Dyspnea on exertion: Nuclear stress test 7/1/2022 was low risk for  ischemia. Will order echo for further evaluation     7. Heart racing: will order zio monitor for further evaluation     Patient is to follow up in office in 4-6 weeks or sooner if needed

## 2022-07-07 RX ORDER — CLOPIDOGREL BISULFATE 75 MG/1
75 TABLET ORAL DAILY
Qty: 30 TABLET | Refills: 0 | Status: SHIPPED | OUTPATIENT
Start: 2022-07-07 | End: 2022-08-02

## 2022-08-02 RX ORDER — CLOPIDOGREL BISULFATE 75 MG/1
TABLET ORAL
Qty: 30 TABLET | Refills: 0 | Status: SHIPPED | OUTPATIENT
Start: 2022-08-02 | End: 2022-08-31

## 2022-08-02 RX ORDER — FENOFIBRATE 160 MG/1
TABLET ORAL
Qty: 90 TABLET | Refills: 0 | Status: SHIPPED | OUTPATIENT
Start: 2022-08-02 | End: 2022-10-27

## 2022-08-17 ENCOUNTER — TELEPHONE (OUTPATIENT)
Dept: CARDIOLOGY | Facility: CLINIC | Age: 57
End: 2022-08-17

## 2022-08-17 NOTE — TELEPHONE ENCOUNTER
I called pt about the Zio.  He said it fell off 2 days after it was placed because he works outside and he sweats.  He tried to call the [employer] team and left 3 messages but never got a call back to tell him what to do.  I told him he should've been told when it was placed to call [employer] if he had any problems wearing the device.  He said he was not told anything.  He works in [place].  He will be back for his appt in Sept.  I told him to go ahead and mail it.  We will see what data it has on it and determine if it is enough and if not they can order another one.  He stated understanding.  Silvino Delaney, CMA

## 2022-08-31 RX ORDER — CLOPIDOGREL BISULFATE 75 MG/1
TABLET ORAL
Qty: 30 TABLET | Refills: 2 | Status: SHIPPED | OUTPATIENT
Start: 2022-08-31 | End: 2022-12-12

## 2022-09-06 ENCOUNTER — OFFICE VISIT (OUTPATIENT)
Dept: FAMILY MEDICINE CLINIC | Facility: CLINIC | Age: 57
End: 2022-09-06

## 2022-09-06 VITALS
WEIGHT: 165 LBS | DIASTOLIC BLOOD PRESSURE: 80 MMHG | BODY MASS INDEX: 25.01 KG/M2 | HEIGHT: 68 IN | HEART RATE: 64 BPM | SYSTOLIC BLOOD PRESSURE: 120 MMHG | OXYGEN SATURATION: 98 %

## 2022-09-06 DIAGNOSIS — Z12.11 SCREENING FOR COLON CANCER: ICD-10-CM

## 2022-09-06 DIAGNOSIS — Z23 NEED FOR PNEUMOCOCCAL VACCINE: ICD-10-CM

## 2022-09-06 DIAGNOSIS — Z00.00 ANNUAL PHYSICAL EXAM: Primary | ICD-10-CM

## 2022-09-06 PROCEDURE — 99396 PREV VISIT EST AGE 40-64: CPT | Performed by: FAMILY MEDICINE

## 2022-09-06 NOTE — PROGRESS NOTES
Subjective   Magalie Myers is a 57 y.o. male.     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 [place]..denies any angina symptoms      Current Outpatient Medications:   •  acetaminophen (TYLENOL) 500 MG tablet, Take 1,000 mg by mouth., Disp: , Rfl:   •  clopidogrel (PLAVIX) 75 MG tablet, TAKE ONE TABLET DAILY GENERIC FOR PLAVIX, Disp: 30 tablet, Rfl: 2  •  esomeprazole (nexIUM) 20 MG capsule, Take 40 mg by mouth Daily., Disp: , Rfl:   •  fenofibrate 160 MG tablet, TAKE ONE TABLET DAILY, Disp: 90 tablet, Rfl: 0  •  HM Aspirin EC Low Dose 81 MG EC tablet, TAKE ONE TABLET DAILY, Disp: 90 tablet, Rfl: 2  •  HYDROcodone-acetaminophen (Norco) 7.5-325 MG per tablet, Take 1 tablet by mouth Every 6 (Six) Hours As Needed for Moderate Pain ., Disp: 68 tablet, Rfl: 0  •  metoprolol tartrate (LOPRESSOR) 25 MG tablet, TAKE ONE TABLET TWICE DAILY AT 6AM AND 6PM GENERIC FOR LOPRESSOR, Disp: 90 tablet, Rfl: 2  •  Naproxen Sodium (ALEVE PO), Take  by mouth As Needed., Disp: , Rfl:   •  pravastatin (PRAVACHOL) 20 MG tablet, Take 20 mg by mouth Daily., Disp: , Rfl:   •  rosuvastatin (CRESTOR) 20 MG tablet, Take 1 tablet by mouth Daily., Disp: 30 tablet, Rfl: 11  Allergies   Allergen Reactions   • Atorvastatin Myalgia       BMI is >= 25 and <30. (Overweight) The following options were offered after discussion;: nutrition counseling/recommendations      Past Medical History:   Diagnosis Date   • Coronary artery disease    • GERD (gastroesophageal reflux disease)    • Injury of back    • Myocardial infarction (HCC)    • Prostate CA (HCC)    • TB (tuberculosis)     50 years ago     Past Surgical History:   Procedure Laterality Date   • BACK SURGERY      Patient has had 2 previous back surgeries.    • CORONARY ARTERY BYPASS GRAFT      x2   • ENDOSCOPY N/A 03/08/2021    Procedure: ESOPHAGOGASTRODUODENOSCOPY WITH ANESTHESIA;  Surgeon:  "Indra Shabazz MD;  Location: [facility] OR;  Service: Gastroenterology;  Laterality: N/A;  pre food bolus  post food bolus  dr cruz ascencio   • PROSTATE SURGERY         Review of Systems   Constitutional: Negative.    HENT: Negative.    Eyes: Negative.    Respiratory: Negative.    Cardiovascular: Negative.    Gastrointestinal: Negative.    Endocrine: Negative.    Genitourinary: Negative.    Musculoskeletal: Negative.    Skin: Negative.    Allergic/Immunologic: Negative.    Neurological: Negative.    Hematological: Negative.    Psychiatric/Behavioral: Negative.        Objective  /80   Pulse 64   Ht 172.7 cm (68\")   Wt 74.8 kg (165 lb)   SpO2 98%   BMI 25.09 kg/m²   Physical Exam  Vitals and nursing note reviewed.   Constitutional:       Appearance: Normal appearance. He is normal weight.   HENT:      Head: Normocephalic.      Nose: Nose normal.      Mouth/Throat:      Mouth: Mucous membranes are moist.   Eyes:      Extraocular Movements: Extraocular movements intact.      Conjunctiva/sclera: Conjunctivae normal.      Pupils: Pupils are equal, round, and reactive to light.   Cardiovascular:      Rate and Rhythm: Normal rate and regular rhythm.      Pulses: Normal pulses.      Heart sounds: Normal heart sounds.   Pulmonary:      Effort: Pulmonary effort is normal.   Abdominal:      General: Abdomen is flat. Bowel sounds are normal.      Palpations: Abdomen is soft.   Musculoskeletal:         General: Normal range of motion.      Cervical back: Normal range of motion and neck supple.   Skin:     General: Skin is warm and dry.      Capillary Refill: Capillary refill takes less than 2 seconds.   Neurological:      General: No focal deficit present.      Mental Status: He is alert and oriented to person, place, and time. Mental status is at baseline.   Psychiatric:         Mood and Affect: Mood normal.         Behavior: Behavior normal.         Thought Content: Thought content normal.         Judgment: " Judgment normal.         Assessment & Plan   Diagnoses and all orders for this visit:    1. Annual physical exam (Primary)  -     PSA DIAGNOSTIC  -     CBC & Differential  -     Comprehensive metabolic panel  -     Lipid Panel With / Chol / HDL Ratio    2. Screening for colon cancer  -     Ambulatory Referral to Gastroenterology  -     PSA DIAGNOSTIC  -     CBC & Differential  -     Comprehensive metabolic panel  -     Lipid Panel With / Chol / HDL Ratio      We discussed covid 19, cancer screening and safety issues.           Orders Placed This Encounter   Procedures   • PSA DIAGNOSTIC     Order Specific Question:   Release to patient     Answer:   Routine Release   • Comprehensive metabolic panel     Order Specific Question:   Release to patient     Answer:   Routine Release   • Lipid Panel With / Chol / HDL Ratio     Order Specific Question:   Release to patient     Answer:   Routine Release   • Ambulatory Referral to Gastroenterology     Referral Priority:   Routine     Referral Type:   Consultation     Referral Reason:   Specialty Services Required     Requested Specialty:   Gastroenterology     Number of Visits Requested:   1   • CBC & Differential       Follow up: 6 month(s)

## 2022-09-07 LAB
ALBUMIN SERPL-MCNC: 4.5 G/DL (ref 3.5–5.2)
ALBUMIN/GLOB SERPL: 2.1 G/DL
ALP SERPL-CCNC: 96 U/L (ref 39–117)
ALT SERPL-CCNC: 15 U/L (ref 1–41)
AST SERPL-CCNC: 16 U/L (ref 1–40)
BASOPHILS # BLD AUTO: 0.05 10*3/MM3 (ref 0–0.2)
BASOPHILS NFR BLD AUTO: 0.5 % (ref 0–1.5)
BILIRUB SERPL-MCNC: 0.4 MG/DL (ref 0–1.2)
BUN SERPL-MCNC: 6 MG/DL (ref 6–20)
BUN/CREAT SERPL: 7.1 (ref 7–25)
CALCIUM SERPL-MCNC: 9.1 MG/DL (ref 8.6–10.5)
CHLORIDE SERPL-SCNC: 99 MMOL/L (ref 98–107)
CHOLEST SERPL-MCNC: 179 MG/DL (ref 0–200)
CHOLEST/HDLC SERPL: 7.46 {RATIO}
CO2 SERPL-SCNC: 29 MMOL/L (ref 22–29)
CREAT SERPL-MCNC: 0.85 MG/DL (ref 0.76–1.27)
EGFRCR-CYS SERPLBLD CKD-EPI 2021: 101.4 ML/MIN/1.73
EOSINOPHIL # BLD AUTO: 0.11 10*3/MM3 (ref 0–0.4)
EOSINOPHIL NFR BLD AUTO: 1.1 % (ref 0.3–6.2)
ERYTHROCYTE [DISTWIDTH] IN BLOOD BY AUTOMATED COUNT: 13.2 % (ref 12.3–15.4)
GLOBULIN SER CALC-MCNC: 2.1 GM/DL
GLUCOSE SERPL-MCNC: 101 MG/DL (ref 65–99)
HCT VFR BLD AUTO: 42.4 % (ref 37.5–51)
HDLC SERPL-MCNC: 24 MG/DL (ref 40–60)
HGB BLD-MCNC: 14.6 G/DL (ref 13–17.7)
IMM GRANULOCYTES # BLD AUTO: 0.03 10*3/MM3 (ref 0–0.05)
IMM GRANULOCYTES NFR BLD AUTO: 0.3 % (ref 0–0.5)
LDLC SERPL CALC-MCNC: 93 MG/DL (ref 0–100)
LYMPHOCYTES # BLD AUTO: 4.29 10*3/MM3 (ref 0.7–3.1)
LYMPHOCYTES NFR BLD AUTO: 43.4 % (ref 19.6–45.3)
MCH RBC QN AUTO: 31.1 PG (ref 26.6–33)
MCHC RBC AUTO-ENTMCNC: 34.4 G/DL (ref 31.5–35.7)
MCV RBC AUTO: 90.4 FL (ref 79–97)
MONOCYTES # BLD AUTO: 0.7 10*3/MM3 (ref 0.1–0.9)
MONOCYTES NFR BLD AUTO: 7.1 % (ref 5–12)
NEUTROPHILS # BLD AUTO: 4.7 10*3/MM3 (ref 1.7–7)
NEUTROPHILS NFR BLD AUTO: 47.6 % (ref 42.7–76)
NRBC BLD AUTO-RTO: 0.1 /100 WBC (ref 0–0.2)
PLATELET # BLD AUTO: 194 10*3/MM3 (ref 140–450)
POTASSIUM SERPL-SCNC: 3.9 MMOL/L (ref 3.5–5.2)
PROT SERPL-MCNC: 6.6 G/DL (ref 6–8.5)
PSA SERPL-MCNC: <0.014 NG/ML (ref 0–4)
RBC # BLD AUTO: 4.69 10*6/MM3 (ref 4.14–5.8)
SODIUM SERPL-SCNC: 136 MMOL/L (ref 136–145)
TRIGL SERPL-MCNC: 373 MG/DL (ref 0–150)
VLDLC SERPL CALC-MCNC: 62 MG/DL (ref 5–40)
WBC # BLD AUTO: 9.88 10*3/MM3 (ref 3.4–10.8)

## 2022-09-07 RX ORDER — ROSUVASTATIN CALCIUM 40 MG/1
40 TABLET, COATED ORAL DAILY
Qty: 90 TABLET | Refills: 1 | Status: SHIPPED | OUTPATIENT
Start: 2022-09-07 | End: 2023-03-22

## 2022-10-03 NOTE — TELEPHONE ENCOUNTER
Caller: Lucia Magalie R    Relationship: Self    Best call back number: [phone]    Medication needed:   Requested Prescriptions     Pending Prescriptions Disp Refills   • HYDROcodone-acetaminophen (Norco) 7.5-325 MG per tablet 21 tablet 0     Sig: Take 1 tablet by mouth Every 6 (Six) Hours As Needed for Moderate Pain .   • cyclobenzaprine (FLEXERIL) 10 MG tablet 30 tablet 0     Sig: Take 1 tablet by mouth 3 (Three) Times a Day As Needed for Muscle Spasms.       When do you need the refill by: ASAP    What details did the patient provide when requesting the medication: PATIENT HAS ONE DAY LEFT.     PATIENT WOULD LIKE A CALL BACK WHEN MEDICATION IS SENT TO PHARMACY PLEASE.     Does the patient have less than a 3 day supply:  [x] Yes  [] No    What is the patient's preferred pharmacy: [id number] [address] & [facility] - [phone] [facility] [phone] FX     -------------------------------------------------------  PATIENT IS ALSO REQUESTING A REFERRAL TO PAIN MANAGEMENT.     PATIENT STATES THAT HIS MRI HAS BEEN DENIED.            -- DO NOT REPLY / DO NOT REPLY ALL --  -- Message is from Engagement Center Operations (ECO) --    Referral Request  Name of Specialist: Mg Velazquez   Provider's specialty: Orthopedics    Medical condition for referral:    719.46 (ICD-9-CM) - M25.569 (ICD-10-CM) - Knee pain, unspecified chronicity, unspecified laterality   M65.30 (ICD-10-CM) - Trigger finger, unspecified finger   Appointment on 10/19/2022 8:30am with Dr. Velazquez       Is this a NEW request?:       Referral ordered by: Laura Varma       Insurance type:       Payor: [facility] MEDICAID / Plan: [facility] MEDICAID HMO / Product Type: T19 HMO      Preferred Delivery Method   Fax - number to send to: [phone] Patient would also like to  a copy as well.     Caller Information       Type Contact Phone/Fax    10/03/2022 10:15 AM CDT Phone (Incoming) Kurt Lanza (Self) [phone] (H)          Alternative phone number: [phone]    Can a detailed message be left? Yes    Please give this turnaround time to the caller:   \"This message will be sent to [state Provider's full name]. The clinical team will return your call as soon as they review your message. Typically, it takes 3 business days to process referral requests.\"

## 2022-10-27 RX ORDER — FENOFIBRATE 160 MG/1
TABLET ORAL
Qty: 90 TABLET | Refills: 0 | Status: SHIPPED | OUTPATIENT
Start: 2022-10-27 | End: 2022-11-28

## 2022-11-06 NOTE — TELEPHONE ENCOUNTER
Okay for referral to pain management-reviewed prior visits, aside from the acute back pain that we are treating, he does have an underlying chronic back pain with prior fusion.   show

## 2022-11-18 ENCOUNTER — TELEPHONE (OUTPATIENT)
Dept: CARDIOLOGY | Facility: CLINIC | Age: 57
End: 2022-11-18

## 2022-11-18 NOTE — TELEPHONE ENCOUNTER
I called pt regarding the monitor he wore in July.  He was contacted in August and he told me that it fell off after 2 days so he did not mail it in.  I had told him to mail it at that time.  I called him again today because we had not received it as of yet and [employer] still has it as deemed as lost.  Pt told me he mailed it right after I spoke with him in August but dropped it in a blue mail box while he was in [place].  (I always advise pts to take it to the post office and have it scanned because they always loose them when dropped in a blue mail box).    Do you want him to wear it again.  If so let me know.  I can use the same order so no need to put another order in.  Silvino Delaney, CMA

## 2022-11-18 NOTE — TELEPHONE ENCOUNTER
I tried to call pt to let him know.  My call would not go through.  I will try again.  Silvino Delaney, CMA

## 2022-11-22 NOTE — TELEPHONE ENCOUNTER
Pt called me back.  He wants to come in to be seen to see if he still needs to have all the test done.  Made him appt for Monday 11/28 to see Stephane.  Silvino Delaney, CMA

## 2022-11-28 ENCOUNTER — OFFICE VISIT (OUTPATIENT)
Dept: CARDIOLOGY | Facility: CLINIC | Age: 57
End: 2022-11-28

## 2022-11-28 VITALS
SYSTOLIC BLOOD PRESSURE: 118 MMHG | RESPIRATION RATE: 18 BRPM | BODY MASS INDEX: 24.25 KG/M2 | OXYGEN SATURATION: 99 % | HEART RATE: 73 BPM | WEIGHT: 160 LBS | HEIGHT: 68 IN | DIASTOLIC BLOOD PRESSURE: 70 MMHG

## 2022-11-28 DIAGNOSIS — R06.09 DYSPNEA ON EXERTION: ICD-10-CM

## 2022-11-28 DIAGNOSIS — Z95.1 S/P CABG (CORONARY ARTERY BYPASS GRAFT): ICD-10-CM

## 2022-11-28 DIAGNOSIS — I25.810 CORONARY ARTERY DISEASE INVOLVING CORONARY BYPASS GRAFT OF NATIVE HEART WITHOUT ANGINA PECTORIS: Primary | ICD-10-CM

## 2022-11-28 DIAGNOSIS — F17.200 SMOKER: ICD-10-CM

## 2022-11-28 DIAGNOSIS — E78.2 MIXED HYPERLIPIDEMIA: ICD-10-CM

## 2022-11-28 DIAGNOSIS — R00.0 RACING HEART BEAT: ICD-10-CM

## 2022-11-28 DIAGNOSIS — I10 PRIMARY HYPERTENSION: ICD-10-CM

## 2022-11-28 PROCEDURE — 99214 OFFICE O/P EST MOD 30 MIN: CPT | Performed by: NURSE PRACTITIONER

## 2022-11-28 PROCEDURE — 93000 ELECTROCARDIOGRAM COMPLETE: CPT | Performed by: NURSE PRACTITIONER

## 2022-11-28 RX ORDER — NITROGLYCERIN 0.4 MG/1
TABLET SUBLINGUAL
Qty: 25 TABLET | Refills: 0 | Status: SHIPPED | OUTPATIENT
Start: 2022-11-28

## 2022-11-28 NOTE — PROGRESS NOTES
Subjective:     Encounter Date: 11/28/2022      Patient ID: Magalie Myers is a 57 y.o. male with coronary artery disease s/p CABG x 2 in [place] 07/2021 (LIMA to LAD and SVG to obtuse marginal branch), Ischemic cardiomyopathy with LVEF 45% at time of CABG, hypertension, hyperlipidemia and smoker    Chief Complaint: 6 month follow up  Coronary Artery Disease  Presents for follow-up visit. Symptoms include chest pain, dizziness and shortness of breath. Pertinent negatives include no chest pressure, chest tightness, leg swelling, muscle weakness, palpitations or weight gain. Risk factors include hyperlipidemia. The symptoms have been worsening. Compliance with diet is good. Compliance with exercise is good. Compliance with medications is good.   Hypertension  This is a chronic problem. The current episode started more than 1 year ago. The problem is controlled. Associated symptoms include chest pain and shortness of breath. Pertinent negatives include no malaise/fatigue, orthopnea, palpitations, peripheral edema or PND. Risk factors for coronary artery disease include male gender and smoking/tobacco exposure. Current antihypertension treatment includes beta blockers. Hypertensive end-organ damage includes CAD/MI.   Hyperlipidemia  This is a chronic problem. The current episode started more than 1 year ago. The problem is uncontrolled. Recent lipid tests were reviewed and are high. Associated symptoms include chest pain and shortness of breath. Current antihyperlipidemic treatment includes statins. Risk factors for coronary artery disease include dyslipidemia, hypertension and male sex.     Patient presents today for management of coronary artery disease. Patient was seen May 2022 for chest pain and bilateral arm pain.  Nuclear stress test was done on 7/01/2022 that was low risk for ischemia. He was also started on crestor at May office visit. Patient was ordered to have an echo done to further evaluate dyspnea on  exertion. He was also placed in a Holter monitor for heart racing that he reports it fell off. He reports that he had mailed it back but the company didn't receive it.   Today he reports that he has been doing ok. He states that he does have occasional bad days. He reports that he has continued to be fatigued at times which has been worse compared to a year and a half ago. He reports that he gets some intermittent chest pain. He reports that they are sharp and located on the left side of his chest. He reports that they last a few minutes and then resolve on their own. He reports that he has some heart racing at times. He may not notice if for a week or so and then a couple weeks later he may have episodes of heart racing everyday. He reports that he has been monitoring his blood pressure and it has remained controlled running 115-120/70-80. Patient follows with Dr Robertson as PCP.     The following portions of the patient's history were reviewed and updated as appropriate: allergies, current medications, past family history, past medical history, past social history, past surgical history and problem list.    Allergies   Allergen Reactions   • Atorvastatin Myalgia       Current Outpatient Medications:   •  acetaminophen (TYLENOL) 500 MG tablet, Take 1,000 mg by mouth., Disp: , Rfl:   •  clopidogrel (PLAVIX) 75 MG tablet, TAKE ONE TABLET DAILY GENERIC FOR PLAVIX, Disp: 30 tablet, Rfl: 2  •  HM Aspirin EC Low Dose 81 MG EC tablet, TAKE ONE TABLET DAILY, Disp: 90 tablet, Rfl: 2  •  HYDROcodone-acetaminophen (Norco) 7.5-325 MG per tablet, Take 1 tablet by mouth Every 6 (Six) Hours As Needed for Moderate Pain ., Disp: 68 tablet, Rfl: 0  •  Naproxen Sodium (ALEVE PO), Take  by mouth As Needed., Disp: , Rfl:   •  esomeprazole (nexIUM) 20 MG capsule, Take 40 mg by mouth Daily., Disp: , Rfl:   •  metoprolol tartrate (LOPRESSOR) 25 MG tablet, TAKE ONE TABLET TWICE DAILY AT 6AM AND 6PM GENERIC FOR LOPRESSOR, Disp: 90 tablet,  Rfl: 2  •  nitroglycerin (NITROSTAT) 0.4 MG SL tablet, 1 under the tongue as needed for angina, may repeat q5mins for up three doses, Disp: 25 tablet, Rfl: 0  •  rosuvastatin (Crestor) 40 MG tablet, Take 1 tablet by mouth Daily., Disp: 90 tablet, Rfl: 1  Past Medical History:   Diagnosis Date   • Coronary artery disease    • GERD (gastroesophageal reflux disease)    • Injury of back    • Myocardial infarction (HCC)    • Prostate CA (HCC)    • TB (tuberculosis)     50 years ago     Social History     Socioeconomic History   • Marital status:    Tobacco Use   • Smoking status: Every Day     Packs/day: 0.25     Types: Cigarettes   • Smokeless tobacco: Never   • Tobacco comments:     quit 7/8/21   Vaping Use   • Vaping Use: Never used   Substance and Sexual Activity   • Alcohol use: No   • Drug use: No   • Sexual activity: Defer       Review of Systems   Constitutional: Negative for malaise/fatigue, weight gain and weight loss.   HENT: Negative for nosebleeds.    Cardiovascular: Positive for chest pain and dyspnea on exertion. Negative for irregular heartbeat, leg swelling, near-syncope, orthopnea, palpitations, paroxysmal nocturnal dyspnea and syncope.   Respiratory: Positive for shortness of breath. Negative for chest tightness.    Hematologic/Lymphatic: Bruises/bleeds easily.   Musculoskeletal: Negative for muscle weakness.   Genitourinary: Negative for hematuria.   Neurological: Positive for dizziness. Negative for weakness.   All other systems reviewed and are negative.         Objective:     Vitals reviewed.   Constitutional:       General: Not in acute distress.     Appearance: Normal appearance. Well-developed.   Eyes:      Pupils: Pupils are equal, round, and reactive to light.   HENT:      Head: Normocephalic and atraumatic.      Nose: Nose normal.   Neck:      Vascular: No carotid bruit.   Pulmonary:      Effort: Pulmonary effort is normal. No respiratory distress.      Breath sounds: Normal breath  "sounds. No wheezing. No rales.   Cardiovascular:      Normal rate. Regular rhythm.      Murmurs: There is no murmur.   Edema:     Peripheral edema absent.   Abdominal:      General: There is no distension.      Palpations: Abdomen is soft.   Musculoskeletal: Normal range of motion.      Cervical back: Normal range of motion and neck supple. Skin:     General: Skin is warm.      Findings: No erythema or rash.   Neurological:      General: No focal deficit present.      Mental Status: Alert and oriented to person, place, and time.   Psychiatric:         Attention and Perception: Attention normal.         Mood and Affect: Mood normal.         Speech: Speech normal.         Behavior: Behavior normal.         Thought Content: Thought content normal.         Judgment: Judgment normal.         /70 (BP Location: Right arm, Patient Position: Sitting)   Pulse 73   Resp 18   Ht 172.7 cm (68\")   Wt 72.6 kg (160 lb)   SpO2 99%   BMI 24.33 kg/m²       ECG 12 Lead    Date/Time: 11/28/2022 11:12 AM  Performed by: Stephane Head APRN  Authorized by: Stephane Head APRN   Comparison: compared with previous ECG from 5/31/2022  Similar to previous ECG  Rhythm: sinus rhythm  Rate: normal  BPM: 73              Lab Review:       Lab Results   Component Value Date    CHLPL 179 09/06/2022    TRIG 373 (H) 09/06/2022    HDL 24 (L) 09/06/2022    LDL 93 09/06/2022     Nuclear stress test 7/01/2022:  Interpretation Summary     · Diaphragmatic attenuation and GI artifacts are present.  · Myocardial perfusion imaging indicates a normal myocardial perfusion study with no evidence of ischemia.  · Left ventricular ejection fraction is normal. (Calculated EF = 58%).  · Impressions are consistent with a low risk study.     I have personally reviewed labs, nuclear stress test and past office notes prior to patients visit  Assessment:          Diagnosis Plan   1. Coronary artery disease involving coronary bypass graft of native heart " without angina pectoris        2. S/P CABG (coronary artery bypass graft) x 2 [facility] 07/2021        3. Primary hypertension        4. Mixed hyperlipidemia        5. Smoker        6. Dyspnea on exertion        7. Racing heart beat  Holter Monitor - 72 Hour Up To 15 Days             Plan:       1. CAD: s/p CABG x 2 in [place] 07/2021 (LIMA to LAD and SVG to obtuse marginal branch). Nuclear stress test 7/1/2022 was low risk for ischemia. Continue aspirin, plavix, metoprolol and rosuvastatin.     2. CABG: x 2 in [place] 07/2021 (LIMA to LAD and SVG to obtuse marginal branch)    3. Hypertension: well controlled. Continue current medications    4. Hyperlipidemia: Lipid panel on 9/2022 showed improved control of cholesterol. LDL at 93. Triglycerides still elevated. Discussed PCSK9i with patient but he would like to continue crestor and diet/exercise at this time. Patient had leg cramps with atorvastatin, he has doen well with Rosuvastatin. Continue rosuvastatin    5. Tobacco abuse:  Magalie Myers  reports that he has been smoking cigarettes. He has been smoking an average of .25 packs per day. He has never used smokeless tobacco.. I have educated him on the risk of diseases from using tobacco products such as cancer, COPD and heart disease. I advised him to quit and he is not willing to quit. I spent 3  minutes counseling the patient.    6. Dyspnea on exertion: Nuclear stress test 7/1/2022 was low risk for ischemia. Will re-order echo for further evaluation     7. Heart racing: will re-order zio monitor for further evaluation    I attest that all portions of this note reviewed and all information has been updated by myself to reflect the patient's current status.      Patient is to follow up in office in  6 months or sooner if needed

## 2022-12-12 RX ORDER — CLOPIDOGREL BISULFATE 75 MG/1
TABLET ORAL
Qty: 30 TABLET | Refills: 2 | Status: SHIPPED | OUTPATIENT
Start: 2022-12-12 | End: 2023-03-22

## 2022-12-23 ENCOUNTER — APPOINTMENT (OUTPATIENT)
Dept: CARDIOLOGY | Facility: HOSPITAL | Age: 57
End: 2022-12-23

## 2022-12-23 ENCOUNTER — HOSPITAL ENCOUNTER (OUTPATIENT)
Dept: CARDIOLOGY | Facility: HOSPITAL | Age: 57
Discharge: HOME | End: 2022-12-23
Admitting: NURSE PRACTITIONER

## 2022-12-23 VITALS
DIASTOLIC BLOOD PRESSURE: 70 MMHG | HEIGHT: 68 IN | BODY MASS INDEX: 24.25 KG/M2 | WEIGHT: 160 LBS | SYSTOLIC BLOOD PRESSURE: 118 MMHG

## 2022-12-23 DIAGNOSIS — I25.810 CORONARY ARTERY DISEASE INVOLVING CORONARY BYPASS GRAFT OF NATIVE HEART WITHOUT ANGINA PECTORIS: ICD-10-CM

## 2022-12-23 DIAGNOSIS — R06.09 DYSPNEA ON EXERTION: ICD-10-CM

## 2022-12-23 PROCEDURE — 93306 TTE W/DOPPLER COMPLETE: CPT | Performed by: INTERNAL MEDICINE

## 2022-12-23 PROCEDURE — 93306 TTE W/DOPPLER COMPLETE: CPT

## 2022-12-24 LAB
BH CV ECHO MEAS - AO MAX PG: 8 MMHG
BH CV ECHO MEAS - AO MEAN PG: 5 MMHG
BH CV ECHO MEAS - AO V2 MAX: 141 CM/SEC
BH CV ECHO MEAS - AO V2 VTI: 31.3 CM
BH CV ECHO MEAS - AVA(I,D): 3.1 CM2
BH CV ECHO MEAS - EDV(CUBED): 103.2 ML
BH CV ECHO MEAS - EDV(MOD-SP2): 77.8 ML
BH CV ECHO MEAS - EDV(MOD-SP4): 67.6 ML
BH CV ECHO MEAS - EF(MOD-BP): 56.5 %
BH CV ECHO MEAS - EF(MOD-SP2): 65.8 %
BH CV ECHO MEAS - EF(MOD-SP4): 52.7 %
BH CV ECHO MEAS - ESV(CUBED): 38.3 ML
BH CV ECHO MEAS - ESV(MOD-SP2): 26.6 ML
BH CV ECHO MEAS - ESV(MOD-SP4): 32 ML
BH CV ECHO MEAS - FS: 28.1 %
BH CV ECHO MEAS - IVS/LVPW: 1.04 CM
BH CV ECHO MEAS - IVSD: 1.11 CM
BH CV ECHO MEAS - LA DIMENSION: 3.6 CM
BH CV ECHO MEAS - LAT PEAK E' VEL: 11.4 CM/SEC
BH CV ECHO MEAS - LV DIASTOLIC VOL/BSA (35-75): 36.4 CM2
BH CV ECHO MEAS - LV MASS(C)D: 184.5 GRAMS
BH CV ECHO MEAS - LV MAX PG: 6.7 MMHG
BH CV ECHO MEAS - LV MEAN PG: 3 MMHG
BH CV ECHO MEAS - LV SYSTOLIC VOL/BSA (12-30): 17.2 CM2
BH CV ECHO MEAS - LV V1 MAX: 129 CM/SEC
BH CV ECHO MEAS - LV V1 VTI: 25.2 CM
BH CV ECHO MEAS - LVIDD: 4.7 CM
BH CV ECHO MEAS - LVIDS: 3.4 CM
BH CV ECHO MEAS - LVOT AREA: 3.8 CM2
BH CV ECHO MEAS - LVOT DIAM: 2.2 CM
BH CV ECHO MEAS - LVPWD: 1.07 CM
BH CV ECHO MEAS - MED PEAK E' VEL: 7 CM/SEC
BH CV ECHO MEAS - MV A MAX VEL: 62.7 CM/SEC
BH CV ECHO MEAS - MV DEC SLOPE: 274 CM/SEC2
BH CV ECHO MEAS - MV E MAX VEL: 63.4 CM/SEC
BH CV ECHO MEAS - MV E/A: 1.01
BH CV ECHO MEAS - MV P1/2T: 85.6 MSEC
BH CV ECHO MEAS - MVA(P1/2T): 2.6 CM2
BH CV ECHO MEAS - PA V2 MAX: 76.6 CM/SEC
BH CV ECHO MEAS - RAP SYSTOLE: 5 MMHG
BH CV ECHO MEAS - RV MAX PG: 1.4 MMHG
BH CV ECHO MEAS - RV V1 MAX: 59.1 CM/SEC
BH CV ECHO MEAS - RVDD: 2.7 CM
BH CV ECHO MEAS - RVSP: 27.1 MMHG
BH CV ECHO MEAS - SI(MOD-SP2): 27.5 ML/M2
BH CV ECHO MEAS - SI(MOD-SP4): 19.2 ML/M2
BH CV ECHO MEAS - SV(LVOT): 95.8 ML
BH CV ECHO MEAS - SV(MOD-SP2): 51.2 ML
BH CV ECHO MEAS - SV(MOD-SP4): 35.6 ML
BH CV ECHO MEAS - TAPSE (>1.6): 1.1 CM
BH CV ECHO MEAS - TR MAX PG: 22.1 MMHG
BH CV ECHO MEAS - TR MAX VEL: 235 CM/SEC
BH CV ECHO MEASUREMENTS AVERAGE E/E' RATIO: 6.89
BH CV XLRA - RV BASE: 2.6 CM
LEFT ATRIUM VOLUME INDEX: 23.7 ML/M2
LEFT ATRIUM VOLUME: 44 ML
MAXIMAL PREDICTED HEART RATE: 163 BPM
STRESS TARGET HR: 139 BPM

## 2022-12-25 NOTE — PROGRESS NOTES
Please call and inform patient that his echo has been read. His EF is normal 56-60%, normal diastolic function and no significant valve disease. Heart is structurally and functionally normal. No explanation for dyspnea. Thanks

## 2023-02-16 RX ORDER — FENOFIBRATE 160 MG/1
TABLET ORAL
Qty: 90 TABLET | Refills: 0 | Status: SHIPPED | OUTPATIENT
Start: 2023-02-16

## 2023-03-22 RX ORDER — CLOPIDOGREL BISULFATE 75 MG/1
TABLET ORAL
Qty: 30 TABLET | Refills: 2 | Status: SHIPPED | OUTPATIENT
Start: 2023-03-22

## 2023-03-22 RX ORDER — ROSUVASTATIN CALCIUM 40 MG/1
40 TABLET, COATED ORAL DAILY
Qty: 90 TABLET | Refills: 1 | Status: SHIPPED | OUTPATIENT
Start: 2023-03-22

## 2023-03-29 NOTE — ED PROVIDER NOTES
direct extension from urinary bladder (HCC)     GERD (gastroesophageal reflux disease)          SURGICAL HISTORY       Past Surgical History:   Procedure Laterality Date    BACK SURGERY      PROSTRATE ULTRASOUND GUIDANCE      SPINAL FUSION           CURRENT MEDICATIONS       Previous Medications    No medications on file       ALLERGIES     Patient has no known allergies. FAMILY HISTORY     History reviewed. No pertinent family history. SOCIAL HISTORY       Social History     Social History    Marital status:      Spouse name: N/A    Number of children: N/A    Years of education: N/A     Social History Main Topics    Smoking status: Current Every Day Smoker     Packs/day: 1.00     Types: Cigarettes    Smokeless tobacco: Never Used    Alcohol use No    Drug use: No    Sexual activity: Not Asked     Other Topics Concern    None     Social History Narrative    None       SCREENINGS             PHYSICAL EXAM    (up to 7 for level 4, 8 or more for level 5)     ED Triage Vitals [11/26/18 0648]   BP Temp Temp src Pulse Resp SpO2 Height Weight   (!) 145/86 98 °F (36.7 °C) -- 83 20 97 % -- --       Physical Exam   Constitutional: He is oriented to person, place, and time. He appears well-developed and well-nourished. No distress. HENT:   Head: Normocephalic and atraumatic. Eyes: Pupils are equal, round, and reactive to light. Neck: Normal range of motion. Neck supple. Cardiovascular: Normal rate, regular rhythm, normal heart sounds and intact distal pulses. Pulmonary/Chest: Effort normal and breath sounds normal. No respiratory distress. Abdominal: Soft. Bowel sounds are normal. He exhibits no distension. There is no tenderness. Musculoskeletal: Normal range of motion. He exhibits no edema. Neurological: He is alert and oriented to person, place, and time. He displays normal reflexes. No cranial nerve deficit. He exhibits normal muscle tone.  Coordination normal.   Normal strength sensation and reflexes   Skin: Skin is warm and dry. No rash noted. He is not diaphoretic. Psychiatric: He has a normal mood and affect. His behavior is normal.   Nursing note and vitals reviewed. DIAGNOSTIC RESULTS     EKG: All EKG's are interpreted by the Emergency Department Physician who either signs or Co-signs this chart in the absence of a cardiologist.      RADIOLOGY:   Non-plain film images such as CT, Ultrasound and MRI are read by the radiologist. Plainradiographic images are visualized and preliminarily interpreted by the emergency physician with the below findings:    Interpretation per the Radiologist below, if available at the time of this note:    No orders to display         ED BEDSIDE ULTRASOUND:   Performed by ED Physician - none    LABS:  Labs Reviewed - No data to display    All other labs were within normal range or not returned as of this dictation. EMERGENCY DEPARTMENT COURSE and DIFFERENTIALDIAGNOSIS/MDM:   Vitals:    Vitals:    11/26/18 0648   BP: (!) 145/86   Pulse: 83   Resp: 20   Temp: 98 °F (36.7 °C)   SpO2: 97%       MDM  No fall or injury. Neuro intact. CONSULTS:  None    PROCEDURES:  Unless otherwise notedbelow, none     Procedures    FINAL IMPRESSION     1.  Acute exacerbation of chronic low back pain          DISPOSITION/PLAN   DISPOSITION Discharge - Pending Orders Complete 11/26/2018 07:10:57 AM      PATIENT REFERRED TO:  @FUP@    DISCHARGE MEDICATIONS:  New Prescriptions    IBUPROFEN (IBU) 800 MG TABLET    Take 1 tablet by mouth every 6 hours as needed for Pain          (Please note that portions of this note were completed with a voice recognition program.  Efforts were made to edit the dictations butoccasionally words are mis-transcribed.)    Devonte Barrientos MD (electronically signed)  AttendingEmergency Physician         Devonte Barrientos MD  11/26/18 8058 Dermal Autograft Text: The defect edges were debeveled with a #15 scalpel blade. Given the location of the defect, shape of the defect and the proximity to free margins a dermal autograft was deemed most appropriate. Using a sterile surgical marker, the primary defect shape was transferred to the donor site. The area thus outlined was incised deep to adipose tissue with a #15 scalpel blade.  The harvested graft was then trimmed of adipose and epidermal tissue until only dermis was left.  The skin graft was then placed in the primary defect and oriented appropriately.

## 2023-05-23 ENCOUNTER — APPOINTMENT (OUTPATIENT)
Dept: GENERAL RADIOLOGY | Facility: HOSPITAL | Age: 58
End: 2023-05-23
Payer: MEDICAID

## 2023-05-23 ENCOUNTER — APPOINTMENT (OUTPATIENT)
Dept: CT IMAGING | Facility: HOSPITAL | Age: 58
End: 2023-05-23
Payer: MEDICAID

## 2023-05-23 ENCOUNTER — HOSPITAL ENCOUNTER (EMERGENCY)
Facility: HOSPITAL | Age: 58
Discharge: HOME | End: 2023-05-23
Attending: STUDENT IN AN ORGANIZED HEALTH CARE EDUCATION/TRAINING PROGRAM
Payer: MEDICAID

## 2023-05-23 VITALS
BODY MASS INDEX: 22.28 KG/M2 | WEIGHT: 147 LBS | DIASTOLIC BLOOD PRESSURE: 83 MMHG | OXYGEN SATURATION: 94 % | RESPIRATION RATE: 18 BRPM | HEIGHT: 68 IN | SYSTOLIC BLOOD PRESSURE: 130 MMHG | HEART RATE: 78 BPM | TEMPERATURE: 97.7 F

## 2023-05-23 DIAGNOSIS — F15.90 CAFFEINE USE DISORDER: ICD-10-CM

## 2023-05-23 DIAGNOSIS — R42 DIZZINESS: Primary | ICD-10-CM

## 2023-05-23 LAB
ALBUMIN SERPL-MCNC: 4.2 G/DL (ref 3.5–5.2)
ALBUMIN/GLOB SERPL: 1.3 G/DL
ALP SERPL-CCNC: 114 U/L (ref 39–117)
ALT SERPL W P-5'-P-CCNC: 9 U/L (ref 1–41)
ANION GAP SERPL CALCULATED.3IONS-SCNC: 15 MMOL/L (ref 5–15)
APTT PPP: 34.3 SECONDS (ref 24.1–35)
AST SERPL-CCNC: 17 U/L (ref 1–40)
BASOPHILS # BLD MANUAL: 0.17 10*3/MM3 (ref 0–0.2)
BASOPHILS NFR BLD MANUAL: 1 % (ref 0–1.5)
BILIRUB SERPL-MCNC: 0.5 MG/DL (ref 0–1.2)
BILIRUB UR QL STRIP: NEGATIVE
BUN SERPL-MCNC: 9 MG/DL (ref 6–20)
BUN/CREAT SERPL: 11.8 (ref 7–25)
CALCIUM SPEC-SCNC: 9.2 MG/DL (ref 8.6–10.5)
CHLORIDE SERPL-SCNC: 101 MMOL/L (ref 98–107)
CLARITY UR: CLEAR
CO2 SERPL-SCNC: 20 MMOL/L (ref 22–29)
COLOR UR: YELLOW
CREAT SERPL-MCNC: 0.76 MG/DL (ref 0.76–1.27)
DEPRECATED RDW RBC AUTO: 41.9 FL (ref 37–54)
EGFRCR SERPLBLD CKD-EPI 2021: 104.8 ML/MIN/1.73
EOSINOPHIL # BLD MANUAL: 0.33 10*3/MM3 (ref 0–0.4)
EOSINOPHIL NFR BLD MANUAL: 2 % (ref 0.3–6.2)
ERYTHROCYTE [DISTWIDTH] IN BLOOD BY AUTOMATED COUNT: 12.9 % (ref 12.3–15.4)
FLUAV RNA RESP QL NAA+PROBE: NOT DETECTED
FLUBV RNA RESP QL NAA+PROBE: NOT DETECTED
GLOBULIN UR ELPH-MCNC: 3.3 GM/DL
GLUCOSE SERPL-MCNC: 146 MG/DL (ref 65–99)
GLUCOSE UR STRIP-MCNC: NEGATIVE MG/DL
HCT VFR BLD AUTO: 41.8 % (ref 37.5–51)
HGB BLD-MCNC: 14.4 G/DL (ref 13–17.7)
HGB UR QL STRIP.AUTO: NEGATIVE
INR PPP: 0.96 (ref 0.91–1.09)
KETONES UR QL STRIP: NEGATIVE
LEUKOCYTE ESTERASE UR QL STRIP.AUTO: NEGATIVE
LYMPHOCYTES # BLD MANUAL: 4.18 10*3/MM3 (ref 0.7–3.1)
LYMPHOCYTES NFR BLD MANUAL: 5 % (ref 5–12)
MCH RBC QN AUTO: 30.6 PG (ref 26.6–33)
MCHC RBC AUTO-ENTMCNC: 34.4 G/DL (ref 31.5–35.7)
MCV RBC AUTO: 88.7 FL (ref 79–97)
MONOCYTES # BLD: 0.84 10*3/MM3 (ref 0.1–0.9)
NEUTROPHILS # BLD AUTO: 11.19 10*3/MM3 (ref 1.7–7)
NEUTROPHILS NFR BLD MANUAL: 67 % (ref 42.7–76)
NITRITE UR QL STRIP: NEGATIVE
PH UR STRIP.AUTO: 7.5 [PH] (ref 5–8)
PLAT MORPH BLD: NORMAL
PLATELET # BLD AUTO: 248 10*3/MM3 (ref 140–450)
PMV BLD AUTO: 10.5 FL (ref 6–12)
POTASSIUM SERPL-SCNC: 3.3 MMOL/L (ref 3.5–5.2)
PROT SERPL-MCNC: 7.5 G/DL (ref 6–8.5)
PROT UR QL STRIP: NEGATIVE
PROTHROMBIN TIME: 12.9 SECONDS (ref 11.8–14.8)
RBC # BLD AUTO: 4.71 10*6/MM3 (ref 4.14–5.8)
RBC MORPH BLD: NORMAL
RSV RNA NPH QL NAA+NON-PROBE: NOT DETECTED
SARS-COV-2 RNA RESP QL NAA+PROBE: NOT DETECTED
SODIUM SERPL-SCNC: 136 MMOL/L (ref 136–145)
SP GR UR STRIP: 1.03 (ref 1–1.03)
TROPONIN T SERPL HS-MCNC: <6 NG/L
UROBILINOGEN UR QL STRIP: NORMAL
VARIANT LYMPHS NFR BLD MANUAL: 18 % (ref 19.6–45.3)
VARIANT LYMPHS NFR BLD MANUAL: 7 % (ref 0–5)
WBC MORPH BLD: NORMAL
WBC NRBC COR # BLD: 16.7 10*3/MM3 (ref 3.4–10.8)

## 2023-05-23 PROCEDURE — 99285 EMERGENCY DEPT VISIT HI MDM: CPT

## 2023-05-23 PROCEDURE — 85025 COMPLETE CBC W/AUTO DIFF WBC: CPT

## 2023-05-23 PROCEDURE — 81003 URINALYSIS AUTO W/O SCOPE: CPT | Performed by: STUDENT IN AN ORGANIZED HEALTH CARE EDUCATION/TRAINING PROGRAM

## 2023-05-23 PROCEDURE — 87637 SARSCOV2&INF A&B&RSV AMP PRB: CPT | Performed by: STUDENT IN AN ORGANIZED HEALTH CARE EDUCATION/TRAINING PROGRAM

## 2023-05-23 PROCEDURE — 85730 THROMBOPLASTIN TIME PARTIAL: CPT | Performed by: STUDENT IN AN ORGANIZED HEALTH CARE EDUCATION/TRAINING PROGRAM

## 2023-05-23 PROCEDURE — 71275 CT ANGIOGRAPHY CHEST: CPT

## 2023-05-23 PROCEDURE — 93005 ELECTROCARDIOGRAM TRACING: CPT

## 2023-05-23 PROCEDURE — 70496 CT ANGIOGRAPHY HEAD: CPT

## 2023-05-23 PROCEDURE — 84484 ASSAY OF TROPONIN QUANT: CPT

## 2023-05-23 PROCEDURE — 25510000001 IOPAMIDOL PER 1 ML: Performed by: STUDENT IN AN ORGANIZED HEALTH CARE EDUCATION/TRAINING PROGRAM

## 2023-05-23 PROCEDURE — 85007 BL SMEAR W/DIFF WBC COUNT: CPT

## 2023-05-23 PROCEDURE — 85610 PROTHROMBIN TIME: CPT | Performed by: STUDENT IN AN ORGANIZED HEALTH CARE EDUCATION/TRAINING PROGRAM

## 2023-05-23 PROCEDURE — 96374 THER/PROPH/DIAG INJ IV PUSH: CPT

## 2023-05-23 PROCEDURE — 71045 X-RAY EXAM CHEST 1 VIEW: CPT

## 2023-05-23 PROCEDURE — 25010000002 ONDANSETRON PER 1 MG: Performed by: STUDENT IN AN ORGANIZED HEALTH CARE EDUCATION/TRAINING PROGRAM

## 2023-05-23 PROCEDURE — 93005 ELECTROCARDIOGRAM TRACING: CPT | Performed by: STUDENT IN AN ORGANIZED HEALTH CARE EDUCATION/TRAINING PROGRAM

## 2023-05-23 PROCEDURE — 70498 CT ANGIOGRAPHY NECK: CPT

## 2023-05-23 PROCEDURE — 70450 CT HEAD/BRAIN W/O DYE: CPT

## 2023-05-23 PROCEDURE — 80053 COMPREHEN METABOLIC PANEL: CPT

## 2023-05-23 RX ORDER — METHYLPREDNISOLONE 4 MG/1
TABLET ORAL
Qty: 1 EACH | Refills: 0 | Status: SHIPPED | OUTPATIENT
Start: 2023-05-23 | End: 2023-05-30

## 2023-05-23 RX ORDER — ONDANSETRON 2 MG/ML
4 INJECTION INTRAMUSCULAR; INTRAVENOUS ONCE
Status: COMPLETED | OUTPATIENT
Start: 2023-05-23 | End: 2023-05-23

## 2023-05-23 RX ORDER — MECLIZINE HYDROCHLORIDE 25 MG/1
25 TABLET ORAL ONCE
Status: COMPLETED | OUTPATIENT
Start: 2023-05-23 | End: 2023-05-23

## 2023-05-23 RX ORDER — MECLIZINE HYDROCHLORIDE 25 MG/1
25 TABLET ORAL 3 TIMES DAILY PRN
Qty: 9 TABLET | Refills: 0 | Status: SHIPPED | OUTPATIENT
Start: 2023-05-23 | End: 2023-05-26

## 2023-05-23 RX ORDER — AZITHROMYCIN 250 MG/1
TABLET, FILM COATED ORAL
Qty: 6 TABLET | Refills: 0 | Status: SHIPPED | OUTPATIENT
Start: 2023-05-23 | End: 2023-05-30

## 2023-05-23 RX ORDER — ONDANSETRON 4 MG/1
4 TABLET, ORALLY DISINTEGRATING ORAL EVERY 6 HOURS PRN
Qty: 9 TABLET | Refills: 0 | Status: SHIPPED | OUTPATIENT
Start: 2023-05-23 | End: 2023-05-26

## 2023-05-23 RX ADMIN — SODIUM CHLORIDE, POTASSIUM CHLORIDE, SODIUM LACTATE AND CALCIUM CHLORIDE 1000 ML: 600; 310; 30; 20 INJECTION, SOLUTION INTRAVENOUS at 01:55

## 2023-05-23 RX ADMIN — SODIUM CHLORIDE, POTASSIUM CHLORIDE, SODIUM LACTATE AND CALCIUM CHLORIDE 1000 ML: 600; 310; 30; 20 INJECTION, SOLUTION INTRAVENOUS at 07:13

## 2023-05-23 RX ADMIN — ONDANSETRON 4 MG: 2 INJECTION INTRAMUSCULAR; INTRAVENOUS at 01:55

## 2023-05-23 RX ADMIN — IOPAMIDOL 100 ML: 755 INJECTION, SOLUTION INTRAVENOUS at 04:40

## 2023-05-23 RX ADMIN — MECLIZINE HYDROCHLORIDE 25 MG: 25 TABLET ORAL at 02:44

## 2023-05-23 RX ADMIN — IOPAMIDOL 100 ML: 755 INJECTION, SOLUTION INTRAVENOUS at 04:30

## 2023-05-23 NOTE — DISCHARGE INSTRUCTIONS
It was very nice to meet you, Magalie. Thank you for allowing us to take care of you today at [facility].    Your evaluation today did not show any emergent findings or have any emergent indications for admission to the hospital.  Please use the medications if you have any new symptoms.  Please come back to the hospital if you have any new symptoms or worsening symptoms.  Please avoid drinking as much caffeine as you currently do or hydrate appropriately.    Please understand that an ER evaluation is just the start of your evaluation. We will do what we can, but we are often unable to fully figure out what is causing your symptoms from one evaluation. Thus, our primary goal is to determine whether you need to be evaluated in the hospital or if it is safe for you to go home and see other doctors such as a primary care physician or a specialist on an outpatient basis.     Like we discussed, it is VERY IMPORTANT that you follow up with your primary care doctor (call them to set up an appointment) within the next few days or as soon as possible so that you can be re-evaluated for improvement in your symptoms or for any other questions.     A copy of your results should be included in your paperwork. If you were prescribed any medications, please take them as directed or call us back with any questions.    Please return to the emergency room within 12-48 hours if you experience fever, chills, chest pain or shortness of breath, pain with inspiration/expiration, pain that travels to your arms, neck or back, nausea, vomiting, severe headache, tearing pain in your chest, dizziness, feel as though you are about to pass out, have any worsening symptoms, or any other concerns.

## 2023-05-23 NOTE — Clinical Note
[facility] EMERGENCY DEPARTMENT  [facility] [facility] [address]  Phone: [phone]    Magalie Myers was seen and treated in our emergency department on 5/23/2023.  He may return to work on 05/24/2023.         Thank you for choosing [facility].    Marley Gastelum MD

## 2023-05-24 LAB
QT INTERVAL: 482 MS
QTC INTERVAL: 512 MS

## 2023-05-29 NOTE — ED PROVIDER NOTES
Subjective   History of Present Illness  Patient came in for sudden onset of dizziness, chills and nausea and vomiting.  States that she was also having some mild sweating when this happened.  States that this has not happened to him before.  Has a history of TB and prostate cancer.  States that he currently does not have any chest pain or shortness of breath.  Denies any saddle paresthesias.  Denies any recent falls that he can think of.  Denies any lower extremity weakness or urinary incontinence or urinary retention.  Has not had any pain radiating to his back from his abdomen or his chest.  Denies any upper extremity numbness or tingling or weakness.  Has not taken any medication for any symptoms so far.    Review of Systems   All other systems reviewed and are negative.    Past Medical History:   Diagnosis Date    Coronary artery disease     GERD (gastroesophageal reflux disease)     Injury of back     Myocardial infarction     Prostate CA     TB (tuberculosis)     50 years ago       Allergies   Allergen Reactions    Atorvastatin Myalgia       Past Surgical History:   Procedure Laterality Date    BACK SURGERY      Patient has had 2 previous back surgeries.     CORONARY ARTERY BYPASS GRAFT      x2    ENDOSCOPY N/A 03/08/2021    Procedure: ESOPHAGOGASTRODUODENOSCOPY WITH ANESTHESIA;  Surgeon: Indra Shabazz MD;  Location: [facility];  Service: Gastroenterology;  Laterality: N/A;  pre food bolus  post food bolus  dr arroyo sonu    PROSTATE SURGERY         Family History   Problem Relation Age of Onset    Arthritis Mother     Heart disease Father     Cancer Father     Heart attack Father     Heart disease Maternal Grandfather     Heart attack Maternal Grandfather     Heart disease Paternal Grandfather     Heart attack Paternal Grandfather        Social History     Socioeconomic History    Marital status:    Tobacco Use    Smoking status: Every Day     Packs/day: 0.25     Types: Cigarettes    Smokeless  tobacco: Never    Tobacco comments:     quit 7/8/21   Vaping Use    Vaping Use: Never used   Substance and Sexual Activity    Alcohol use: No    Drug use: No    Sexual activity: Defer           Objective   Physical Exam  Vitals and nursing note reviewed.   Constitutional:       General: He is in acute distress (due to dizziness).      Appearance: Normal appearance. He is not toxic-appearing or diaphoretic.   HENT:      Head: Normocephalic and atraumatic.      Right Ear: External ear normal.      Left Ear: External ear normal.      Nose: Nose normal.      Mouth/Throat:      Mouth: Mucous membranes are moist.   Eyes:      General:         Right eye: No discharge.         Left eye: No discharge.      Extraocular Movements: Extraocular movements intact.      Conjunctiva/sclera: Conjunctivae normal.   Cardiovascular:      Rate and Rhythm: Normal rate.   Pulmonary:      Effort: Pulmonary effort is normal. No respiratory distress.      Breath sounds: No rhonchi.   Abdominal:      General: Abdomen is flat.      Tenderness: There is no abdominal tenderness. There is no guarding or rebound.   Musculoskeletal:         General: No deformity or signs of injury.      Cervical back: Neck supple. No tenderness.   Skin:     General: Skin is warm.      Capillary Refill: Capillary refill takes less than 2 seconds.      Coloration: Skin is not jaundiced.   Neurological:      Mental Status: He is alert and oriented to person, place, and time. Mental status is at baseline.      Cranial Nerves: No cranial nerve deficit.      Sensory: No sensory deficit.      Motor: No weakness.      Coordination: Coordination normal.   Psychiatric:         Mood and Affect: Mood normal.         Behavior: Behavior normal.         Thought Content: Thought content normal.         Judgment: Judgment normal.       Procedures           ED Course                                           Medical Decision Making  Magalie Myers is a very pleasant 57 y.o. male who  presents to the ED for dizziness.     Patient was non-toxic and not-ill appearing on arrival. Vital signs stable on arrival.     Patient's presentation raises suspicion for differentials including, but not limited to, vertigo, labyrinthitis, CVA.     External (non-ED) record review: none    Given this, Magalie was placed on the monitor. Laboratory studies and imaging studies were ordered including cbc, cmp, ct head, cta head, cta neck.     Magalie patient was given fluids and meclizine and anti-emetics for symptomatic relief.    Labs were reviewed and pertinent for mild hypokalemia. On re-evaluation, patient remained hemodynamically stable and appeared to be comfortable and in no acute distress.    Given findings described above, patient's presentation is most likely related to vertigo. At this point, after reviewing the workup, I have a low suspicion for CVA or dissection or MI.     With the history provided, current physical exam, and results so far, I have low suspicion for SAH as the patient's headache did not reach maximal intensity within a short period of time, is not the worst headache of the patient's life, and there are no visual deficits. A cerebellar infarct is unlikely as the patient's finger-nose-finger, brief in-room ambulation trial normal, and heel shin testing is grossly intact. The ear examination shows normal TM's bilaterally and no effusions or signs of OM or perforation. Bacterial labyrinthitis is unlikely as the patient has not had any recent URI or OM, is currently afebrile, and the dizziness is not severe and constant. Acoustic neuroma less likely given lack of tinnitus, neck pain, or pain behind ear. Meningitis unlikely with no meningismus on exam, no photophobia, no neck stiffness, no rashes, and no new neurological deficits. Dissection is also unlikely as the patient does not have pupillary deficits, pulse deficits, is not hypotensive, and has no new neurological deficits. Epidural or  spinal abscess very unlikely without overlying skin changes or any history of IVDU. Suspicion for CRAO/CRVO is also low as the patient did not have painless vision loss and has no diplopia on exam. No evidence of hypopyon or hyphema on exam. No evidence of globe rupture or EOM entrapment.     I had a discussion regarding the workup, results so far, and my impression so far. I said that it is VERY important he stops drinking as many energy drinks every day (over 1000mg of caffeine equivalent) and drink water [place]. I said that based on the current workup, there is not an unstable medical condition requiring admission to the hospital. He was dehydrated as well and so was given fluids and feels back to his baseline. He and his wife were appreciate of the care.  I told him I can always admit him for further evaluation or work-up with MRIs however at this point given his symptoms have resolved and he has no other questions or concerns would be appropriate to have this done as an outpatient.  The wife also agreed with this plan of care and had no further questions.  She also wishes he would stop drinking any energy drinks.    I spent an appropriate amount of time necessary at the bedside preceding discharge so I could explain aftercare instructions, provide more patient eduction, give explanations of my interpretation of the evaluation, and to ensure that everyone understood the plan of care. I also discussed the fact that even after being discharged there could be an acute emergent condition that arises requiring further evaluation, admission, or even surgical intervention.     However, I said that it is VERY IMPORTANT that Magalie follows up, by CALLING as soon as possible to set up an appointment, with the primary care doctor within the next few days or as soon as reasonably possible so that the symptoms can be re-evaluated for improvement or for any other questions.     I also gave Magalie  common sense return precautions and encouraged a quick return to the emergency department within 24 - 48hrs if there are ANY concerns, worsening of condition, new complaints, or if unable to seek follow-up in a timely fashion.     The patient verbalized understanding of the discharge instructions and agreed with them.     Magalie was discharged in stable condition.      Signed by:   Marley Gastelum MD 5/29/2023 09:10 CDT   Emergency Medicine Physician    Dragon disclaimer:  Part of this note may be an electronic transcription/translation of spoken language to printed text using the Dragon Dictation System.         Problems Addressed:  Caffeine use disorder: complicated acute illness or injury  Dizziness: complicated acute illness or injury    Amount and/or Complexity of Data Reviewed  Labs: ordered.  Radiology: ordered.  ECG/medicine tests: ordered.    Risk  Prescription drug management.        Final diagnoses:   Dizziness   Caffeine use disorder       ED Disposition  ED Disposition       ED Disposition   Discharge    Condition   Stable    Comment   --               Lawrence Robertson MD  [address] [place] [id number]  [phone]    Call in 1 day  As needed, If symptoms worsen         Medication List        New Prescriptions      azithromycin 250 MG tablet  Commonly known as: Zithromax Z-Judd  Take 2 tablets by mouth on day 1, then 1 tablet daily on days 2-5     methylPREDNISolone 4 MG dose pack  Commonly known as: MEDROL  Take as directed on package instructions.            ASK your doctor about these medications      meclizine 25 MG tablet  Commonly known as: ANTIVERT  Take 1 tablet by mouth 3 (Three) Times a Day As Needed for Dizziness for up to 3 days.  Ask about: Should I take this medication?     ondansetron ODT 4 MG disintegrating tablet  Commonly known as: ZOFRAN-ODT  Place 1 tablet on the tongue Every 6 (Six) Hours As Needed for Nausea or Vomiting for up to 3 days.  Ask about: Should I take this  medication?               Where to Get Your Medications        These medications were sent to [facility] - [place], [place] - [address] [place] [address] - [phone] PH - [phone] FX  [address] [place] [address]      Phone: [phone]   azithromycin 250 MG tablet  meclizine 25 MG tablet  methylPREDNISolone 4 MG dose pack  ondansetron ODT 4 MG disintegrating tablet            Marley Gastelum MD  05/29/23 0970

## 2023-05-30 ENCOUNTER — OFFICE VISIT (OUTPATIENT)
Dept: CARDIOLOGY | Facility: CLINIC | Age: 58
End: 2023-05-30

## 2023-05-30 VITALS
SYSTOLIC BLOOD PRESSURE: 112 MMHG | DIASTOLIC BLOOD PRESSURE: 66 MMHG | OXYGEN SATURATION: 99 % | HEART RATE: 76 BPM | WEIGHT: 151 LBS | HEIGHT: 68 IN | BODY MASS INDEX: 22.88 KG/M2

## 2023-05-30 DIAGNOSIS — F17.200 SMOKER: ICD-10-CM

## 2023-05-30 DIAGNOSIS — I25.810 CORONARY ARTERY DISEASE INVOLVING CORONARY BYPASS GRAFT OF NATIVE HEART WITHOUT ANGINA PECTORIS: Primary | ICD-10-CM

## 2023-05-30 DIAGNOSIS — E78.2 MIXED HYPERLIPIDEMIA: ICD-10-CM

## 2023-05-30 DIAGNOSIS — I10 PRIMARY HYPERTENSION: ICD-10-CM

## 2023-05-30 DIAGNOSIS — Z95.1 S/P CABG (CORONARY ARTERY BYPASS GRAFT): ICD-10-CM

## 2023-05-30 PROCEDURE — 99214 OFFICE O/P EST MOD 30 MIN: CPT | Performed by: NURSE PRACTITIONER

## 2023-05-30 RX ORDER — CLOPIDOGREL BISULFATE 75 MG/1
75 TABLET ORAL DAILY
Qty: 90 TABLET | Refills: 3 | Status: SHIPPED | OUTPATIENT
Start: 2023-05-30

## 2023-05-30 NOTE — PROGRESS NOTES
Subjective:     Encounter Date: 05/30/2023      Patient ID: Magalie Myers is a 57 y.o. male with coronary artery disease s/p CABG x 2 in [place] 07/2021 (LIMA to LAD and SVG to obtuse marginal branch), Ischemic cardiomyopathy with LVEF 45% at time of CABG, hypertension, hyperlipidemia and smoker    Chief Complaint: 6 month follow up  Coronary Artery Disease  Presents for follow-up visit. Symptoms include dizziness and shortness of breath. Pertinent negatives include no chest pain, chest pressure, chest tightness, leg swelling, muscle weakness, palpitations or weight gain. Risk factors include hyperlipidemia. The symptoms have been worsening. Compliance with diet is variable. Compliance with exercise is variable. Compliance with medications is good.   Hypertension  This is a chronic problem. The current episode started more than 1 year ago. The problem is controlled. Associated symptoms include shortness of breath. Pertinent negatives include no chest pain, malaise/fatigue, orthopnea, palpitations, peripheral edema or PND. Risk factors for coronary artery disease include male gender and smoking/tobacco exposure. Current antihypertension treatment includes beta blockers. Hypertensive end-organ damage includes CAD/MI.   Hyperlipidemia  This is a chronic problem. The current episode started more than 1 year ago. The problem is uncontrolled. Recent lipid tests were reviewed and are high. Associated symptoms include shortness of breath. Pertinent negatives include no chest pain. Current antihyperlipidemic treatment includes statins. Risk factors for coronary artery disease include dyslipidemia, hypertension and male sex.     Patient presents today for management of coronary artery disease. An echo was done on 12/23/2022 that showed LVEF 56-60%, and no significant valvular disease due to dyspnea on exertion. We also ordered a holter monitor that he states it stayed on half a day. Today he reports that he was in the ER  last week. He reports that he got up to use the restroom about 1230 and got dizzy. He reports that he got sweaty. He reports that he vomited twice after getting to the ER when he was having profound dizziness. He reports that this is similar to when he had his MI. He called EMS. He took a NTG and was given another one en route. He denies any chest pain during episode. HS troponin >6. EKG showed no acute changes. He reports that he gets weak and tired easily. He reports that he has continued to be short of breath at times. He denies any further episodes. He reports that he has continued to have some intermittent heart racing. He denies any palpitations. He reports that he gets some intermittent chest pain. He reports that they are sharp and located on the left side of his chest. He reports that he hasnt been monitoring his blood pressure. Patient follows with Dr Robertson as PCP.     The following portions of the patient's history were reviewed and updated as appropriate: allergies, current medications, past family history, past medical history, past social history, past surgical history and problem list.    Allergies   Allergen Reactions    Atorvastatin Myalgia       Current Outpatient Medications:     acetaminophen (TYLENOL) 500 MG tablet, Take 2 tablets by mouth., Disp: , Rfl:     clopidogrel (PLAVIX) 75 MG tablet, Take 1 tablet by mouth Daily., Disp: 90 tablet, Rfl: 3    esomeprazole (nexIUM) 20 MG capsule, Take 2 capsules by mouth Daily., Disp: , Rfl:     fenofibrate 160 MG tablet, TAKE ONE TABLET DAILY (Patient not taking: Reported on 5/30/2023), Disp: 90 tablet, Rfl: 0    HM Aspirin EC Low Dose 81 MG EC tablet, TAKE ONE TABLET DAILY, Disp: 90 tablet, Rfl: 2    HYDROcodone-acetaminophen (Norco) 7.5-325 MG per tablet, Take 1 tablet by mouth Every 6 (Six) Hours As Needed for Moderate Pain ., Disp: 68 tablet, Rfl: 0    metoprolol tartrate (LOPRESSOR) 25 MG tablet, Take 1 tablet by mouth 2 (Two) Times a Day., Disp:  120 tablet, Rfl: 11    Naproxen Sodium (ALEVE PO), Take  by mouth As Needed., Disp: , Rfl:     nitroglycerin (NITROSTAT) 0.4 MG SL tablet, 1 under the tongue as needed for angina, may repeat q5mins for up three doses, Disp: 25 tablet, Rfl: 0    rosuvastatin (CRESTOR) 40 MG tablet, TAKE 1 TABLET BY MOUTH DAILY., Disp: 90 tablet, Rfl: 1  Past Medical History:   Diagnosis Date    Coronary artery disease     GERD (gastroesophageal reflux disease)     Injury of back     Myocardial infarction     Prostate CA     TB (tuberculosis)     50 years ago     Social History     Socioeconomic History    Marital status:    Tobacco Use    Smoking status: Every Day     Packs/day: 0.25     Types: Cigarettes     Passive exposure: Current    Smokeless tobacco: Never    Tobacco comments:     quit 7/8/21   Vaping Use    Vaping Use: Never used   Substance and Sexual Activity    Alcohol use: No    Drug use: No    Sexual activity: Defer       Review of Systems   Constitutional: Negative for malaise/fatigue, weight gain and weight loss.   HENT:  Negative for nosebleeds.    Cardiovascular:  Negative for chest pain, dyspnea on exertion, irregular heartbeat, leg swelling, near-syncope, orthopnea, palpitations, paroxysmal nocturnal dyspnea and syncope.   Respiratory:  Positive for shortness of breath. Negative for chest tightness.    Hematologic/Lymphatic: Bruises/bleeds easily.   Musculoskeletal:  Negative for muscle weakness.   Genitourinary:  Negative for hematuria.   Neurological:  Positive for dizziness. Negative for weakness.   All other systems reviewed and are negative.       Objective:     Vitals reviewed.   Constitutional:       General: Not in acute distress.     Appearance: Normal appearance. Well-developed.   Eyes:      Pupils: Pupils are equal, round, and reactive to light.   HENT:      Head: Normocephalic and atraumatic.      Nose: Nose normal.   Neck:      Vascular: No carotid bruit.   Pulmonary:      Effort: Pulmonary  "effort is normal. No respiratory distress.      Breath sounds: Normal breath sounds. No wheezing. No rales.   Cardiovascular:      Normal rate. Regular rhythm.      Murmurs: There is no murmur.   Edema:     Peripheral edema absent.   Abdominal:      General: There is no distension.      Palpations: Abdomen is soft.   Musculoskeletal: Normal range of motion.      Cervical back: Normal range of motion and neck supple. Skin:     General: Skin is warm.      Findings: No erythema or rash.   Neurological:      General: No focal deficit present.      Mental Status: Alert and oriented to person, place, and time.   Psychiatric:         Attention and Perception: Attention normal.         Mood and Affect: Mood normal.         Speech: Speech normal.         Behavior: Behavior normal.         Thought Content: Thought content normal.         Judgment: Judgment normal.       /66 (BP Location: Left arm, Patient Position: Sitting, Cuff Size: Adult)   Pulse 76   Ht 172.7 cm (68\")   Wt 68.5 kg (151 lb)   SpO2 99%   BMI 22.96 kg/m²     Procedures    Lab Review:       Lab Results   Component Value Date    CHLPL 179 09/06/2022    TRIG 373 (H) 09/06/2022    HDL 24 (L) 09/06/2022    LDL 93 09/06/2022     Nuclear stress test 7/01/2022:  Interpretation Summary     Diaphragmatic attenuation and GI artifacts are present.  Myocardial perfusion imaging indicates a normal myocardial perfusion study with no evidence of ischemia.  Left ventricular ejection fraction is normal. (Calculated EF = 58%).  Impressions are consistent with a low risk study.     I have personally reviewed labs, nuclear stress test and past office notes prior to patients visit  Assessment:          Diagnosis Plan   1. Coronary artery disease involving coronary bypass graft of native heart without angina pectoris        2. S/P CABG (coronary artery bypass graft) x 2 [facility] 07/2021        3. Primary hypertension        4. Mixed " hyperlipidemia        5. Smoker                 Plan:       1. CAD: s/p CABG x 2 in [place] 07/2021 (LIMA to LAD and SVG to obtuse marginal branch). Nuclear stress test 7/1/2022 was low risk for ischemia. Continue aspirin, plavix, metoprolol and rosuvastatin. Discussed patients ER visit with negative troponin and no acute EKG changes. Patient has had no further episodes of symptoms. He is to monitor and notify office with recurrence of symptoms.     2. CABG: x 2 in [place] 07/2021 (LIMA to LAD and SVG to obtuse marginal branch)    3. Hypertension: well controlled. Continue current medications    4. Hyperlipidemia: Lipid panel on 9/2022 showed improved control of cholesterol. LDL at 93. Triglycerides still elevated. Discussed PCSK9i with patient but he would like to continue crestor and diet/exercise at this time. Patient had leg cramps with atorvastatin, he has done well with Rosuvastatin. Continue rosuvastatin. Will recheck at follow up if PCP hasnt before    5. Tobacco abuse:  Magalie LYNCH Lucia  reports that he has been smoking cigarettes. He has been smoking an average of .25 packs per day. He has been exposed to tobacco smoke. He has never used smokeless tobacco.. I have educated him on the risk of diseases from using tobacco products such as cancer, COPD and heart disease. I advised him to quit and he is not willing to quit. I spent 3  minutes counseling the patient.    I attest that all portions of this note reviewed and all information has been updated by myself to reflect the patient's current status.      I spent 38 minutes caring for Magalie on this date of service. This time includes time spent by me in the following activities:preparing for the visit, reviewing tests, obtaining and/or reviewing a separately obtained history, performing a medically appropriate examination and/or evaluation , counseling and educating the patient/family/caregiver, and documenting information in the medical record    Patient is  to follow up in office in  6 months or sooner if needed

## 2023-08-29 ENCOUNTER — OFFICE VISIT (OUTPATIENT)
Dept: FAMILY MEDICINE CLINIC | Facility: CLINIC | Age: 58
End: 2023-08-29
Payer: COMMERCIAL

## 2023-08-29 VITALS
OXYGEN SATURATION: 98 % | HEART RATE: 68 BPM | DIASTOLIC BLOOD PRESSURE: 60 MMHG | TEMPERATURE: 97.2 F | RESPIRATION RATE: 18 BRPM | WEIGHT: 148 LBS | HEIGHT: 68 IN | BODY MASS INDEX: 22.43 KG/M2 | SYSTOLIC BLOOD PRESSURE: 118 MMHG

## 2023-08-29 DIAGNOSIS — M25.521 RIGHT ELBOW PAIN: ICD-10-CM

## 2023-08-29 DIAGNOSIS — R21 SKIN RASH: Primary | ICD-10-CM

## 2023-08-29 PROCEDURE — 99213 OFFICE O/P EST LOW 20 MIN: CPT | Performed by: FAMILY MEDICINE

## 2023-08-29 RX ORDER — PREDNISONE 10 MG/1
TABLET ORAL
Qty: 15 TABLET | Refills: 0 | Status: SHIPPED | OUTPATIENT
Start: 2023-08-29

## 2023-08-29 RX ORDER — MUPIROCIN CALCIUM 20 MG/G
1 CREAM TOPICAL 2 TIMES DAILY
Qty: 30 G | Refills: 0 | Status: SHIPPED | OUTPATIENT
Start: 2023-08-29

## 2023-08-29 RX ORDER — MINOCYCLINE HYDROCHLORIDE 100 MG/1
100 CAPSULE ORAL 2 TIMES DAILY
Qty: 29 CAPSULE | Refills: 0 | Status: SHIPPED | OUTPATIENT
Start: 2023-08-29

## 2023-08-29 NOTE — PROGRESS NOTES
Subjective   Magalie Myers is a 58 y.o. male.     Chief Complaint   Patient presents with    Elbow Pain     Sores on arms        History of Present Illness     Kaden has 2 concerns--he notes pain in the right elbos for a week approx without ijuruy hx--also notes pustulr eruption on his forearsm for mos      Current Outpatient Medications:     acetaminophen (TYLENOL) 500 MG tablet, Take 2 tablets by mouth., Disp: , Rfl:     clopidogrel (PLAVIX) 75 MG tablet, Take 1 tablet by mouth Daily., Disp: 90 tablet, Rfl: 3    esomeprazole (nexIUM) 20 MG capsule, Take 2 capsules by mouth Daily., Disp: , Rfl:     fenofibrate 160 MG tablet, TAKE ONE TABLET DAILY, Disp: 90 tablet, Rfl: 0    HM Aspirin EC Low Dose 81 MG EC tablet, TAKE ONE TABLET DAILY, Disp: 90 tablet, Rfl: 2    HYDROcodone-acetaminophen (Norco) 7.5-325 MG per tablet, Take 1 tablet by mouth Every 6 (Six) Hours As Needed for Moderate Pain ., Disp: 68 tablet, Rfl: 0    metoprolol tartrate (LOPRESSOR) 25 MG tablet, Take 1 tablet by mouth 2 (Two) Times a Day., Disp: 120 tablet, Rfl: 11    Naproxen Sodium (ALEVE PO), Take  by mouth As Needed., Disp: , Rfl:     nitroglycerin (NITROSTAT) 0.4 MG SL tablet, 1 under the tongue as needed for angina, may repeat q5mins for up three doses, Disp: 25 tablet, Rfl: 0    rosuvastatin (CRESTOR) 40 MG tablet, TAKE 1 TABLET BY MOUTH DAILY., Disp: 90 tablet, Rfl: 1    minocycline (Minocin) 100 MG capsule, Take 1 capsule by mouth 2 (Two) Times a Day., Disp: 29 capsule, Rfl: 0    mupirocin (BACTROBAN) 2 % cream, Apply 1 application  topically to the appropriate area as directed 2 (Two) Times a Day. May sustitute ointment, Disp: 30 g, Rfl: 0    predniSONE (DELTASONE) 10 MG tablet, 20mg x 3 days then 10mg x 5 days then 5mg x 3 days, Disp: 15 tablet, Rfl: 0  Allergies   Allergen Reactions    Atorvastatin Myalgia       BMI is within normal parameters. No other follow-up for BMI required.      Facility age limit for growth percentiles is 20  "years.    Past Medical History:   Diagnosis Date    Coronary artery disease     GERD (gastroesophageal reflux disease)     Injury of back     Myocardial infarction     Prostate CA     TB (tuberculosis)     50 years ago     Past Surgical History:   Procedure Laterality Date    BACK SURGERY      Patient has had 2 previous back surgeries.     CORONARY ARTERY BYPASS GRAFT      x2    ENDOSCOPY N/A 03/08/2021    Procedure: ESOPHAGOGASTRODUODENOSCOPY WITH ANESTHESIA;  Surgeon: Indra Shabazz MD;  Location: [facility];  Service: Gastroenterology;  Laterality: N/A;  pre food bolus  post food bolus  dr arroyo sonu    PROSTATE SURGERY         Review of Systems   Constitutional: Negative.    HENT: Negative.     Eyes: Negative.    Respiratory: Negative.     Cardiovascular: Negative.    Gastrointestinal: Negative.    Endocrine: Negative.    Genitourinary: Negative.    Musculoskeletal:  Positive for arthralgias.   Skin:  Positive for rash.   Allergic/Immunologic: Negative.    Neurological: Negative.    Hematological: Negative.    Psychiatric/Behavioral: Negative.       Objective /60   Pulse 68   Temp 97.2 øF (36.2 øC)   Resp 18   Ht 172.7 cm (67.99\")   Wt 67.1 kg (148 lb)   SpO2 98%   BMI 22.51 kg/mý    Physical Exam  Vitals and nursing note reviewed.   Constitutional:       Appearance: Normal appearance. He is normal weight.   HENT:      Head: Normocephalic and atraumatic.      Nose: Nose normal.      Mouth/Throat:      Mouth: Mucous membranes are moist.   Eyes:      Pupils: Pupils are equal, round, and reactive to light.   Cardiovascular:      Rate and Rhythm: Normal rate and regular rhythm.      Pulses: Normal pulses.      Heart sounds: Normal heart sounds.   Pulmonary:      Effort: Pulmonary effort is normal.      Breath sounds: Normal breath sounds.   Abdominal:      General: Abdomen is flat. Bowel sounds are normal.      Palpations: Abdomen is soft.   Musculoskeletal:         General: Tenderness present.    "   Cervical back: Normal range of motion and neck supple.   Skin:     General: Skin is warm and dry.      Capillary Refill: Capillary refill takes less than 2 seconds.   Neurological:      General: No focal deficit present.      Mental Status: He is alert and oriented to person, place, and time. Mental status is at baseline.   Psychiatric:         Mood and Affect: Mood normal.       Assessment & Plan   Diagnoses and all orders for this visit:    1. Skin rash (Primary)    2. Right elbow pain  -     XR elbow 2 vw right    Other orders  -     minocycline (Minocin) 100 MG capsule; Take 1 capsule by mouth 2 (Two) Times a Day.  Dispense: 29 capsule; Refill: 0  -     mupirocin (BACTROBAN) 2 % cream; Apply 1 application  topically to the appropriate area as directed 2 (Two) Times a Day. May sustitute ointment  Dispense: 30 g; Refill: 0  -     predniSONE (DELTASONE) 10 MG tablet; 20mg x 3 days then 10mg x 5 days then 5mg x 3 days  Dispense: 15 tablet; Refill: 0                 Orders Placed This Encounter   Procedures    XR elbow 2 vw right     Order Specific Question:   Reason for Exam:     Answer:   elbow pain     Order Specific Question:   Does this patient have a diabetic monitoring/medication delivering device on?     Answer:   No     Order Specific Question:   Release to patient     Answer:   Routine Release [[id number]]       Follow up: 2 week(s)

## 2023-08-30 ENCOUNTER — HOSPITAL ENCOUNTER (OUTPATIENT)
Dept: GENERAL RADIOLOGY | Facility: HOSPITAL | Age: 58
Discharge: HOME | End: 2023-08-30
Admitting: FAMILY MEDICINE
Payer: COMMERCIAL

## 2023-08-30 DIAGNOSIS — M25.521 RIGHT ELBOW PAIN: Primary | ICD-10-CM

## 2023-08-30 PROCEDURE — 73070 X-RAY EXAM OF ELBOW: CPT

## 2023-09-15 ENCOUNTER — TELEPHONE (OUTPATIENT)
Dept: FAMILY MEDICINE CLINIC | Facility: CLINIC | Age: 58
End: 2023-09-15

## 2023-12-09 ENCOUNTER — HOSPITAL ENCOUNTER (EMERGENCY)
Age: 58
Discharge: HOME | End: 2023-12-09
Payer: MEDICARE

## 2023-12-09 ENCOUNTER — APPOINTMENT (OUTPATIENT)
Dept: GENERAL RADIOLOGY | Age: 58
End: 2023-12-09
Payer: MEDICARE

## 2023-12-09 VITALS
SYSTOLIC BLOOD PRESSURE: 121 MMHG | OXYGEN SATURATION: 98 % | HEART RATE: 68 BPM | DIASTOLIC BLOOD PRESSURE: 71 MMHG | RESPIRATION RATE: 16 BRPM | BODY MASS INDEX: 22.79 KG/M2 | WEIGHT: 149.91 LBS | TEMPERATURE: 98 F

## 2023-12-09 DIAGNOSIS — R13.19 ESOPHAGEAL DYSPHAGIA: Primary | ICD-10-CM

## 2023-12-09 LAB
ALBUMIN SERPL-MCNC: 4.1 G/DL (ref 3.5–5.2)
ALP SERPL-CCNC: 113 U/L (ref 40–130)
ALT SERPL-CCNC: 10 U/L (ref 5–41)
ANION GAP SERPL CALCULATED.3IONS-SCNC: 9 MMOL/L (ref 7–19)
APTT PPP: 37.4 SEC (ref 26–36.2)
AST SERPL-CCNC: 20 U/L (ref 5–40)
BASOPHILS # BLD: 0.1 K/UL (ref 0–0.2)
BASOPHILS NFR BLD: 0.8 % (ref 0–1)
BILIRUB SERPL-MCNC: 0.3 MG/DL (ref 0.2–1.2)
BUN SERPL-MCNC: 7 MG/DL (ref 6–20)
CALCIUM SERPL-MCNC: 9 MG/DL (ref 8.6–10)
CHLORIDE SERPL-SCNC: 97 MMOL/L (ref 98–111)
CO2 SERPL-SCNC: 26 MMOL/L (ref 22–29)
CREAT SERPL-MCNC: 0.6 MG/DL (ref 0.5–1.2)
EOSINOPHIL # BLD: 0.4 K/UL (ref 0–0.6)
EOSINOPHIL NFR BLD: 3.6 % (ref 0–5)
ERYTHROCYTE [DISTWIDTH] IN BLOOD BY AUTOMATED COUNT: 12.8 % (ref 11.5–14.5)
GLUCOSE SERPL-MCNC: 121 MG/DL (ref 74–109)
HCT VFR BLD AUTO: 41.1 % (ref 42–52)
HGB BLD-MCNC: 14.1 G/DL (ref 14–18)
IMM GRANULOCYTES # BLD: 0 K/UL
INR PPP: 1 (ref 0.88–1.18)
LYMPHOCYTES # BLD: 4 K/UL (ref 1.1–4.5)
LYMPHOCYTES NFR BLD: 38.6 % (ref 20–40)
MCH RBC QN AUTO: 31 PG (ref 27–31)
MCHC RBC AUTO-ENTMCNC: 34.3 G/DL (ref 33–37)
MCV RBC AUTO: 90.3 FL (ref 80–94)
MONOCYTES # BLD: 0.7 K/UL (ref 0–0.9)
MONOCYTES NFR BLD: 6.3 % (ref 0–10)
NEUTROPHILS # BLD: 5.2 K/UL (ref 1.5–7.5)
NEUTS SEG NFR BLD: 50.5 % (ref 50–65)
PLATELET # BLD AUTO: 230 K/UL (ref 130–400)
PMV BLD AUTO: 10.7 FL (ref 9.4–12.4)
POTASSIUM SERPL-SCNC: 4.1 MMOL/L (ref 3.5–5)
PROT SERPL-MCNC: 7.5 G/DL (ref 6.6–8.7)
PROTHROMBIN TIME: 12.9 SEC (ref 12–14.6)
RBC # BLD AUTO: 4.55 M/UL (ref 4.7–6.1)
SODIUM SERPL-SCNC: 132 MMOL/L (ref 136–145)
WBC # BLD AUTO: 10.3 K/UL (ref 4.8–10.8)

## 2023-12-09 PROCEDURE — 6360000002 HC RX W HCPCS: Performed by: PHYSICIAN ASSISTANT

## 2023-12-09 PROCEDURE — 99284 EMERGENCY DEPT VISIT MOD MDM: CPT

## 2023-12-09 PROCEDURE — 2580000003 HC RX 258: Performed by: PHYSICIAN ASSISTANT

## 2023-12-09 PROCEDURE — 80053 COMPREHEN METABOLIC PANEL: CPT

## 2023-12-09 PROCEDURE — 71045 X-RAY EXAM CHEST 1 VIEW: CPT

## 2023-12-09 PROCEDURE — 85610 PROTHROMBIN TIME: CPT

## 2023-12-09 PROCEDURE — 6370000000 HC RX 637 (ALT 250 FOR IP): Performed by: PHYSICIAN ASSISTANT

## 2023-12-09 PROCEDURE — 96374 THER/PROPH/DIAG INJ IV PUSH: CPT

## 2023-12-09 PROCEDURE — 36415 COLL VENOUS BLD VENIPUNCTURE: CPT

## 2023-12-09 PROCEDURE — 85730 THROMBOPLASTIN TIME PARTIAL: CPT

## 2023-12-09 PROCEDURE — 85025 COMPLETE CBC W/AUTO DIFF WBC: CPT

## 2023-12-09 RX ORDER — NITROGLYCERIN 0.4 MG/1
0.4 TABLET SUBLINGUAL EVERY 5 MIN PRN
Status: DISCONTINUED | OUTPATIENT
Start: 2023-12-09 | End: 2023-12-09 | Stop reason: HOSPADM

## 2023-12-09 RX ORDER — ACETAMINOPHEN 325 MG/1
650 TABLET ORAL ONCE
Status: COMPLETED | OUTPATIENT
Start: 2023-12-09 | End: 2023-12-09

## 2023-12-09 RX ORDER — 0.9 % SODIUM CHLORIDE 0.9 %
500 INTRAVENOUS SOLUTION INTRAVENOUS ONCE
Status: COMPLETED | OUTPATIENT
Start: 2023-12-09 | End: 2023-12-09

## 2023-12-09 RX ADMIN — NITROGLYCERIN 0.4 MG: 0.4 TABLET, ORALLY DISINTEGRATING SUBLINGUAL at 19:25

## 2023-12-09 RX ADMIN — Medication 1 MG: at 19:26

## 2023-12-09 RX ADMIN — ACETAMINOPHEN 650 MG: 325 TABLET ORAL at 19:40

## 2023-12-09 RX ADMIN — SODIUM CHLORIDE 500 ML: 9 INJECTION, SOLUTION INTRAVENOUS at 19:25

## 2023-12-09 ASSESSMENT — ENCOUNTER SYMPTOMS
BACK PAIN: 0
COLOR CHANGE: 0
EYE DISCHARGE: 0
SHORTNESS OF BREATH: 0
PHOTOPHOBIA: 0
COUGH: 0
APNEA: 0
EYE ITCHING: 0

## 2023-12-10 NOTE — ED NOTES
Pt reports relief after taking medications and is able to swallow at this time.       Berlin Stephens RN  12/09/23 2003

## 2023-12-10 NOTE — ED PROVIDER NOTES
[facility] EMERGENCY DEPT  eMERGENCYdEPARTMENT eNCOUnter      Pt Name: Scott Hauser  MRN: [id number]  [address] 1965  Date of evaluation: 12/9/2023  Provider:KIMBER Hansen    CHIEF COMPLAINT       Chief Complaint   Patient presents with    Foreign Body     Patient states that he has steak suck; difficult to swallow saliva          HISTORY OF PRESENT ILLNESS  (Location/Symptom, Timing/Onset, Context/Setting, Quality, Duration, Modifying Factors, Severity.)   Scott Hauser is a 62 y.o. male who presents to the emergency department with concern for food bolus eating steak at [employer] 2 hrs PTA. Has had this happen before can usually get to pass. He has been doing well has open heart 2 years ago denies chest pain. Hiccups not tolerating his secretions. He has had stretching before estimated last EGD 10 years ago. Denies fever chills no voice changes. HPI    Nursing Notes were reviewed and I agree. REVIEW OF SYSTEMS    (2-9 systems for level 4, 10 or more for level 5)     Review of Systems   Constitutional:  Negative for activity change, appetite change, chills and fever. HENT:  Negative for congestion and dental problem. Dysphagia   Eyes:  Negative for photophobia, discharge and itching. Respiratory:  Negative for apnea, cough and shortness of breath. Cardiovascular:  Negative for chest pain. Musculoskeletal:  Negative for arthralgias, back pain, gait problem, myalgias and neck pain. Skin:  Negative for color change, pallor and rash. Neurological:  Negative for dizziness, seizures and syncope. Psychiatric/Behavioral:  Negative for agitation. The patient is not nervous/anxious. Except as noted above the remainder of the review of systems was reviewed and negative.        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

## 2024-01-04 RX ORDER — NITROGLYCERIN 0.4 MG/1
TABLET SUBLINGUAL
Qty: 25 TABLET | Refills: 0 | Status: SHIPPED | OUTPATIENT
Start: 2024-01-04

## 2024-01-04 NOTE — TELEPHONE ENCOUNTER
"Caller: Lucia, Magalie LYNCH \"Kaden\"    Relationship: Self    Best call back number: [phone]    Requested Prescriptions:   Requested Prescriptions     Pending Prescriptions Disp Refills    nitroglycerin (NITROSTAT) 0.4 MG SL tablet 25 tablet 0     Si under the tongue as needed for angina, may repeat q5mins for up three doses        Pharmacy where request should be sent: [facility] - [place], [place] - [address] [facility] [phone] [facility] [phone] FX     Last office visit with prescribing clinician: 2023   Last telemedicine visit with prescribing clinician: Visit date not found   Next office visit with prescribing clinician: Visit date not found     Additional details provided by patient: PATIENT ACCIDENTALLY WASHED HIS MEDICATION AND IS COMPLETELY OUT NOW OF MEDICATION. PLEASE SEND OVER A NEW PRESCRIPTION FOR HIM, THANK YOU!    Does the patient have less than a 3 day supply:  [x] Yes  [] No    Would you like a call back once the refill request has been completed: [] Yes [x] No    If the office needs to give you a call back, can they leave a voicemail: [] Yes [x] No    Reji John Rep   24 08:29 CST   "

## 2024-01-15 ENCOUNTER — OFFICE VISIT (OUTPATIENT)
Dept: GASTROENTEROLOGY | Age: 59
End: 2024-01-15
Payer: COMMERCIAL

## 2024-01-15 VITALS
DIASTOLIC BLOOD PRESSURE: 80 MMHG | SYSTOLIC BLOOD PRESSURE: 120 MMHG | HEIGHT: 68 IN | HEART RATE: 71 BPM | OXYGEN SATURATION: 97 % | WEIGHT: 152 LBS | BODY MASS INDEX: 23.04 KG/M2

## 2024-01-15 DIAGNOSIS — R13.10 DYSPHAGIA, UNSPECIFIED TYPE: ICD-10-CM

## 2024-01-15 DIAGNOSIS — K21.9 CHRONIC GERD: Primary | ICD-10-CM

## 2024-01-15 DIAGNOSIS — Z12.11 COLON CANCER SCREENING: ICD-10-CM

## 2024-01-15 PROCEDURE — 99204 OFFICE O/P NEW MOD 45 MIN: CPT | Performed by: NURSE PRACTITIONER

## 2024-01-15 RX ORDER — ACETAMINOPHEN 500 MG
1000 TABLET ORAL EVERY 6 HOURS PRN
COMMUNITY

## 2024-01-15 RX ORDER — ESOMEPRAZOLE MAGNESIUM 20 MG/1
40 GRANULE, DELAYED RELEASE ORAL PRN
COMMUNITY

## 2024-01-15 RX ORDER — ASPIRIN 81 MG/1
1 TABLET ORAL DAILY
COMMUNITY
Start: 2022-05-02

## 2024-01-15 RX ORDER — CLOPIDOGREL BISULFATE 75 MG/1
75 TABLET ORAL DAILY
COMMUNITY
Start: 2023-05-30

## 2024-01-15 RX ORDER — HYDROCODONE BITARTRATE AND ACETAMINOPHEN 7.5; 325 MG/1; MG/1
TABLET ORAL EVERY 6 HOURS PRN
COMMUNITY

## 2024-01-15 RX ORDER — NITROGLYCERIN 0.4 MG/1
0.4 TABLET SUBLINGUAL EVERY 5 MIN PRN
COMMUNITY
Start: 2024-01-04

## 2024-01-15 ASSESSMENT — ENCOUNTER SYMPTOMS
COUGH: 0
BLOOD IN STOOL: 0
RECTAL PAIN: 0
ABDOMINAL PAIN: 0
TROUBLE SWALLOWING: 1
DIARRHEA: 0
CONSTIPATION: 0
SHORTNESS OF BREATH: 0
ANAL BLEEDING: 0
VOMITING: 0
ABDOMINAL DISTENTION: 0
NAUSEA: 0
CHOKING: 0

## 2024-01-15 NOTE — PROGRESS NOTES
Subjective:     Patient ID: Emily Martinez is a 58 y.o. male  PCP: Juan Cohen MD  Referring Provider: No ref. provider found    HPI  Patient presents to the office today with the following complaints: New Patient and Dysphagia      Pt seen today for c/o dysphagia.  Pt presented to ER on 12/9/2023 with food bolus, steak stuck.  This passed in ER with glucagon.  This had occurred in the past but was able to pass it on his own.  He reports issues eating \"stringy meats.\"  This is chronic for years, worsening.  Hx chronic GERD.  Currently treated with Nexium 40 mg OTC prn.              Patient denies any lower GI symptoms such as constipation, diarrhea, change in bowel habits, rectal bleeding, and rectal pain.    Last EGD 4/2021 - with dilation  He has never had a Colonoscopy   Denies any family hx colon cancer or colon polyps    Hx CAD with CABG 2021.  Pt reports times with angina.    Assessment:     1. Chronic GERD    2. Dysphagia, unspecified type    3. Colon cancer screening            Plan:   - Continue Nexium 40 mg daily, pt prefers to get this OTC  - Schedule Colonoscopy and EGD  - Obtain Cardiac clearance and clearance to hold Plavix   Instruct on bowel prep.   Nothing to eat or drink after midnight the day of the exam.  Unable to drive for 24 hours after the procedure.   No aspirin or nonsteroidal anti-inflammatories for 5 days before procedure.  I have discussed the benefits, alternatives, and risks (including bleeding, perforation and death)  for pursuing Endoscopy (EGD/Colonscopy/EUS/ERCP) with the patient and they are willing to continue. We also discussed the need for anesthesia, IV access, proper dietary changes, medication changes if necessary, and need for bowel prep (if ordered) prior to their Endoscopic procedure.  They are aware they must have someone accompany them to their scheduled procedure to drive them home - they agree to the above and are willing to continue.          Orders  No orders

## 2024-01-15 NOTE — PATIENT INSTRUCTIONS
Schedule colonoscopy and endoscopy.     Do not eat or drink after midnight the day of the procedure. Allowed medications can be taken with a small sip of water. Please review your prep instructions for allowed medications.     You will not be able to drive for 24 hours after the procedure due to sedation. Must have a responsible adult, 18 years or older, accompany you to drive you home the day of your procedure.    If you are on blood thinners, clearance from the prescribing physician will be obtained before your procedure is scheduled. If it is determined it is not safe to hold these medications for a short time an alternative procedure for evaluation may be recommended.     No aspirin, ibuprofen, naproxen, fish oil or vitamin E for 5 days before procedure.     Risks of colonoscopy and endoscopy include, but are not limited to, perforation, bleeding, and infection, Risk of perforation and bleeding are increased if there is a polyp removed or dilation completed.    Anesthesia risks will be reviewed with you before the procedure by a member of the anesthesia department.     Your physician may also schedule a follow up appointment with the nurse practitioner to discuss pathology, symptoms or to check if you have had any problems related to your procedure. If you prefer not to return to the office after your procedure please discuss this with your physician on the day of your colonoscopy.     The physician will talk with you and/or your family after the procedure is completed. Final recommendations are based on the pathologist report if biopsies or specimens are taken.     If polyps are removed during the procedure they will be sent to a pathologist for analysis. Unless you have a follow up appointment scheduled, you will be notified by mail of the pathology results within 4 weeks. If you have not received results after 4 weeks you may call the office to obtain this information.     For Colonoscopy:  You will be given

## 2024-01-18 ENCOUNTER — TELEPHONE (OUTPATIENT)
Dept: CARDIOLOGY | Facility: CLINIC | Age: 59
End: 2024-01-18
Payer: COMMERCIAL

## 2024-01-18 NOTE — TELEPHONE ENCOUNTER
PT TO UNDERGO AN ENDO / COLON ON 2/13/24    DR FRANCOIS WOULD LIKE TO HOLD PLAVIX 5 DAYS PRIOR TO THE PROCEDURE    PLEASE ADVISE    FORM TO BE SIGNED

## 2024-01-21 NOTE — DISCHARGE INSTRUCTIONS
Esophagitis  Introduction  Esophagitis is inflammation of the esophagus. The esophagus is the tube that carries food and liquids from your mouth to your stomach. Esophagitis can cause soreness or pain in the esophagus. This condition can make it difficult and painful to swallow.  What are the causes?  Most causes of esophagitis are not serious. Common causes of this condition include:  · Gastroesophageal reflux disease (GERD). This is when stomach contents move back up into the esophagus (reflux).  · Repeated vomiting.  · An allergic-type reaction, especially caused by food allergies (eosinophilic esophagitis).  · Injury to the esophagus by swallowing large pills with or without water, or swallowing certain types of medicines.  · Swallowing (ingesting) harmful chemicals, such as household cleaning products.  · Heavy alcohol use.  · An infection of the esophagus. This most often occurs in people who have a weakened immune system.  · Radiation or chemotherapy treatment for cancer.  · Certain diseases such as sarcoidosis, Crohn disease, and scleroderma.  What are the signs or symptoms?  Symptoms of this condition include:  · Difficult or painful swallowing.  · Pain with swallowing acidic liquids, such as citrus juices.  · Pain with burping.  · Chest pain.  · Difficulty breathing.  · Nausea.  · Vomiting.  · Pain in the abdomen.  · Weight loss.  · Ulcers in the mouth.  · Patches of white material in the mouth (candidiasis).  · Fever.  · Coughing up blood or vomiting blood.  · Stool that is black, tarry, or bright red.  How is this diagnosed?  Your health care provider will take a medical history and perform a physical exam. You may also have other tests, including:  · An endoscopy to examine your stomach and esophagus with a small camera.  · A test that measures the acidity level in your esophagus.  · A test that measures how much pressure is on your esophagus.  · A barium swallow or modified barium swallow to show the  shape, size, and functioning of your esophagus.  · Allergy tests.  How is this treated?  Treatment for this condition depends on the cause of your esophagitis. In some cases, steroids or other medicines may be given to help relieve your symptoms or to treat the underlying cause of your condition. You may have to make some lifestyle changes, such as:  · Avoiding alcohol.  · Quitting smoking.  · Changing your diet.  · Exercising.  · Changing your sleep habits and your sleep environment.  Follow these instructions at home:  Take these actions to decrease your discomfort and to help avoid complications.  Diet  · Follow a diet as recommended by your health care provider. This may involve avoiding foods and drinks such as:  ¨ Coffee and tea (with or without caffeine).  ¨ Drinks that contain alcohol.  ¨ Energy drinks and sports drinks.  ¨ Carbonated drinks or sodas.  ¨ Chocolate and cocoa.  ¨ Peppermint and mint flavorings.  ¨ Garlic and onions.  ¨ Horseradish.  ¨ Spicy and acidic foods, including peppers, chili powder, bowman powder, vinegar, hot sauces, and barbecue sauce.  ¨ Citrus fruit juices and citrus fruits, such as oranges, venkat, and limes.  ¨ Tomato-based foods, such as red sauce, chili, salsa, and pizza with red sauce.  ¨ Fried and fatty foods, such as donuts, french fries, potato chips, and high-fat dressings.  ¨ High-fat meats, such as hot dogs and fatty cuts of red and white meats, such as rib eye steak, sausage, ham, and tate.  ¨ High-fat dairy items, such as whole milk, butter, and cream cheese.  · Eat small, frequent meals instead of large meals.  · Avoid drinking large amounts of liquid with your meals.  · Avoid eating meals during the 2-3 hours before bedtime.  · Avoid lying down right after you eat.  · Do not exercise right after you eat.  · Avoid foods and drinks that seem to make your symptoms worse.  General instructions  · Pay attention to any changes in your symptoms.  · Take over-the-counter and  prescription medicines only as told by your health care provider. Do not take aspirin, ibuprofen, or other NSAIDs unless your health care provider told you to do so.  · If you have trouble taking pills, use a pill splitter to decrease the size of the pill. This will decrease the chance of the pill getting stuck or injuring your esophagus on the way down. Also, drink water after you take a pill.  · Do not use any tobacco products, including cigarettes, chewing tobacco, and e-cigarettes. If you need help quitting, ask your health care provider.  · Wear loose-fitting clothing. Do not wear anything tight around your waist that causes pressure on your abdomen.  · Raise (elevate) the head of your bed about 6 inches (15 cm).  · Try to reduce your stress, such as with yoga or meditation. If you need help reducing stress, ask your health care provider.  · If you are overweight, reduce your weight to an amount that is healthy for you. Ask your health care provider for guidance about a safe weight loss goal.  · Keep all follow-up visits as told by your health care provider. This is important.  Contact a health care provider if:  · You have new symptoms.  · You have unexplained weight loss.  · You have difficulty swallowing, or it hurts to swallow.  · You have wheezing or a persistent cough.  · Your symptoms do not improve with treatment.  · You have frequent heartburn for more than two weeks.  Get help right away if:  · You have severe pain in your arms, neck, jaw, teeth, or back.  · You feel sweaty, dizzy, or light-headed.  · You have chest pain or shortness of breath.  · You vomit and your vomit looks like blood or coffee grounds.  · Your stool is bloody or black.  · You have a fever.  · You cannot swallow, drink, or eat.  This information is not intended to replace advice given to you by your health care provider. Make sure you discuss any questions you have with your health care provider.  Document Released: 01/25/2006  Document Revised: 05/25/2017 Document Reviewed: 04/13/2016  © 2017 [employer]vier     (V5) oriented

## 2024-02-05 ENCOUNTER — TELEPHONE (OUTPATIENT)
Dept: GASTROENTEROLOGY | Age: 59
End: 2024-02-05

## 2024-02-05 NOTE — TELEPHONE ENCOUNTER
Patient: Emily Martinez    YOB: 1965      Clearance was received on February 5, 2024.    for Endoscopy / Colonoscopy scheduled for: EGD/CLN    Patient may discontinue the use of PLAVIX for 5 days prior to the procedure.    IS Lovenox required:  NO    PATIENT NOTIFIED ON:  LM ON 2/5/24      Clearance scanned into media

## 2024-02-06 ENCOUNTER — OFFICE VISIT (OUTPATIENT)
Dept: FAMILY MEDICINE CLINIC | Facility: CLINIC | Age: 59
End: 2024-02-06
Payer: COMMERCIAL

## 2024-02-06 VITALS
RESPIRATION RATE: 16 BRPM | SYSTOLIC BLOOD PRESSURE: 144 MMHG | BODY MASS INDEX: 21.98 KG/M2 | OXYGEN SATURATION: 98 % | HEIGHT: 68 IN | HEART RATE: 78 BPM | TEMPERATURE: 97.3 F | WEIGHT: 145 LBS | DIASTOLIC BLOOD PRESSURE: 86 MMHG

## 2024-02-06 DIAGNOSIS — J18.9 PNEUMONIA DUE TO INFECTIOUS ORGANISM, UNSPECIFIED LATERALITY, UNSPECIFIED PART OF LUNG: Primary | ICD-10-CM

## 2024-02-06 PROCEDURE — 99213 OFFICE O/P EST LOW 20 MIN: CPT | Performed by: FAMILY MEDICINE

## 2024-02-06 RX ORDER — METHYLPREDNISOLONE 4 MG/1
TABLET ORAL
Qty: 21 TABLET | Refills: 0 | Status: SHIPPED | OUTPATIENT
Start: 2024-02-06

## 2024-02-06 RX ORDER — LEVOFLOXACIN 500 MG/1
500 TABLET, FILM COATED ORAL DAILY
Qty: 10 TABLET | Refills: 0 | Status: SHIPPED | OUTPATIENT
Start: 2024-02-06

## 2024-02-06 NOTE — PROGRESS NOTES
Subjective   Magalie Myers is a 58 y.o. male.     No chief complaint on file.       History of Present Illness     This 58 yr old smoker presaent with one week of cough, congstion and dyspnea with exertion.--also low grade temp--refuses any swabs      Current Outpatient Medications:     acetaminophen (TYLENOL) 500 MG tablet, Take 2 tablets by mouth., Disp: , Rfl:     clopidogrel (PLAVIX) 75 MG tablet, Take 1 tablet by mouth Daily., Disp: 90 tablet, Rfl: 3    esomeprazole (nexIUM) 20 MG capsule, Take 2 capsules by mouth Daily., Disp: , Rfl:     fenofibrate 160 MG tablet, TAKE ONE TABLET DAILY, Disp: 90 tablet, Rfl: 0    HYDROcodone-acetaminophen (Norco) 7.5-325 MG per tablet, Take 1 tablet by mouth Every 6 (Six) Hours As Needed for Moderate Pain ., Disp: 68 tablet, Rfl: 0    metoprolol tartrate (LOPRESSOR) 25 MG tablet, Take 1 tablet by mouth 2 (Two) Times a Day., Disp: 120 tablet, Rfl: 11    Naproxen Sodium (ALEVE PO), Take  by mouth As Needed., Disp: , Rfl:     nitroglycerin (NITROSTAT) 0.4 MG SL tablet, 1 under the tongue as needed for angina, may repeat q5mins for up three doses, Disp: 25 tablet, Rfl: 0    rosuvastatin (CRESTOR) 40 MG tablet, TAKE 1 TABLET BY MOUTH DAILY., Disp: 90 tablet, Rfl: 1    Symbicort 160-4.5 MCG/ACT inhaler, INHALE 2 PUFFS TWICE DAILY. RINSE MOUTH AFTER USE., Disp: , Rfl:     HM Aspirin EC Low Dose 81 MG EC tablet, TAKE ONE TABLET DAILY, Disp: 90 tablet, Rfl: 2    levoFLOXacin (Levaquin) 500 MG tablet, Take 1 tablet by mouth Daily., Disp: 10 tablet, Rfl: 0    methylPREDNISolone (MEDROL) 4 MG dose pack, Take as directed on package instructions., Disp: 21 tablet, Rfl: 0  Allergies   Allergen Reactions    Atorvastatin Myalgia       BMI is within normal parameters. No other follow-up for BMI required.      Facility age limit for growth %melvi is 20 years.    Past Medical History:   Diagnosis Date    Coronary artery disease     GERD (gastroesophageal reflux disease)     Injury of back      Myocardial infarction     Prostate CA     TB (tuberculosis)     50 years ago     Past Surgical History:   Procedure Laterality Date    BACK SURGERY      Patient has had 2 previous back surgeries.     CORONARY ARTERY BYPASS GRAFT      x2    ENDOSCOPY N/A 03/08/2021    Procedure: ESOPHAGOGASTRODUODENOSCOPY WITH ANESTHESIA;  Surgeon: Indra Shabazz MD;  Location: [facility];  Service: Gastroenterology;  Laterality: N/A;  pre food bolus  post food bolus  dr arroyo sonu    PROSTATE SURGERY         Review of Systems   Constitutional: Negative.    HENT:  Positive for congestion.    Eyes: Negative.    Respiratory:  Positive for cough and wheezing.    Cardiovascular: Negative.    Gastrointestinal: Negative.    Endocrine: Negative.    Genitourinary: Negative.    Musculoskeletal: Negative.    Allergic/Immunologic: Negative.    Neurological: Negative.    Hematological: Negative.        Objective   Physical Exam  Vitals and nursing note reviewed.   Constitutional:       Appearance: Normal appearance.   HENT:      Head: Normocephalic and atraumatic.      Nose: Congestion present.      Mouth/Throat:      Mouth: Mucous membranes are moist.   Eyes:      Pupils: Pupils are equal, round, and reactive to light.   Cardiovascular:      Rate and Rhythm: Normal rate and regular rhythm.      Pulses: Normal pulses.   Pulmonary:      Effort: Pulmonary effort is normal.      Breath sounds: Rhonchi and rales present.   Abdominal:      General: Abdomen is flat.   Musculoskeletal:         General: Normal range of motion.      Cervical back: Normal range of motion.   Skin:     General: Skin is warm.      Capillary Refill: Capillary refill takes less than 2 seconds.   Neurological:      General: No focal deficit present.      Mental Status: He is alert.   Psychiatric:         Mood and Affect: Mood normal.         Assessment & Plan   Diagnoses and all orders for this visit:    1. Pneumonia due to infectious organism, unspecified laterality,  unspecified part of lung (Primary)    Other orders  -     levoFLOXacin (Levaquin) 500 MG tablet; Take 1 tablet by mouth Daily.  Dispense: 10 tablet; Refill: 0  -     methylPREDNISolone (MEDROL) 4 MG dose pack; Take as directed on package instructions.  Dispense: 21 tablet; Refill: 0      Keep me informeed           No orders of the defined types were placed in this encounter.      Follow up: 4 week(s)

## 2024-02-22 ENCOUNTER — TELEPHONE (OUTPATIENT)
Dept: GASTROENTEROLOGY | Age: 59
End: 2024-02-22

## 2024-02-22 NOTE — TELEPHONE ENCOUNTER
Called patient to remind them of their procedure with Dr. CARMONA at [facility]  on 2/23/24  to arrive at 815     RESPONSE:  NO ANS/NO VM

## 2024-03-08 ENCOUNTER — OFFICE VISIT (OUTPATIENT)
Dept: FAMILY MEDICINE CLINIC | Facility: CLINIC | Age: 59
End: 2024-03-08
Payer: COMMERCIAL

## 2024-03-08 VITALS
DIASTOLIC BLOOD PRESSURE: 68 MMHG | WEIGHT: 145 LBS | OXYGEN SATURATION: 97 % | BODY MASS INDEX: 21.98 KG/M2 | HEIGHT: 68 IN | TEMPERATURE: 97.3 F | SYSTOLIC BLOOD PRESSURE: 126 MMHG | HEART RATE: 65 BPM

## 2024-03-08 DIAGNOSIS — R06.00 DYSPNEA, UNSPECIFIED TYPE: ICD-10-CM

## 2024-03-08 DIAGNOSIS — Z12.11 SCREEN FOR COLON CANCER: ICD-10-CM

## 2024-03-08 DIAGNOSIS — F17.200 SMOKER: ICD-10-CM

## 2024-03-08 DIAGNOSIS — I25.810 CORONARY ARTERY DISEASE INVOLVING CORONARY BYPASS GRAFT OF NATIVE HEART WITHOUT ANGINA PECTORIS: Primary | ICD-10-CM

## 2024-03-08 NOTE — PROGRESS NOTES
Subjective   Magalie Myers is a 58 y.o. male.     Chief Complaint   Patient presents with    Follow-up        History of Present Illness     Kaden is noting chest pain and dyspnea at times--he also notes fatigue..      Current Outpatient Medications:     acetaminophen (TYLENOL) 500 MG tablet, Take 2 tablets by mouth., Disp: , Rfl:     clopidogrel (PLAVIX) 75 MG tablet, Take 1 tablet by mouth Daily., Disp: 90 tablet, Rfl: 3    esomeprazole (nexIUM) 20 MG capsule, Take 2 capsules by mouth Daily., Disp: , Rfl:     fenofibrate 160 MG tablet, TAKE ONE TABLET DAILY, Disp: 90 tablet, Rfl: 0    HM Aspirin EC Low Dose 81 MG EC tablet, TAKE ONE TABLET DAILY, Disp: 90 tablet, Rfl: 2    HYDROcodone-acetaminophen (Norco) 7.5-325 MG per tablet, Take 1 tablet by mouth Every 6 (Six) Hours As Needed for Moderate Pain ., Disp: 68 tablet, Rfl: 0    metoprolol tartrate (LOPRESSOR) 25 MG tablet, Take 1 tablet by mouth 2 (Two) Times a Day., Disp: 120 tablet, Rfl: 11    Naproxen Sodium (ALEVE PO), Take  by mouth As Needed., Disp: , Rfl:     nitroglycerin (NITROSTAT) 0.4 MG SL tablet, 1 under the tongue as needed for angina, may repeat q5mins for up three doses, Disp: 25 tablet, Rfl: 0    rosuvastatin (CRESTOR) 40 MG tablet, TAKE 1 TABLET BY MOUTH DAILY., Disp: 90 tablet, Rfl: 1    Symbicort 160-4.5 MCG/ACT inhaler, INHALE 2 PUFFS TWICE DAILY. RINSE MOUTH AFTER USE., Disp: , Rfl:   Allergies   Allergen Reactions    Atorvastatin Myalgia       BMI is within normal parameters. No other follow-up for BMI required.      Facility age limit for growth %melvi is 20 years.    Past Medical History:   Diagnosis Date    Coronary artery disease     GERD (gastroesophageal reflux disease)     Injury of back     Myocardial infarction     Prostate CA     TB (tuberculosis)     50 years ago     Past Surgical History:   Procedure Laterality Date    BACK SURGERY      Patient has had 2 previous back surgeries.     CORONARY ARTERY BYPASS GRAFT      x2     ENDOSCOPY N/A 03/08/2021    Procedure: ESOPHAGOGASTRODUODENOSCOPY WITH ANESTHESIA;  Surgeon: Indra Shabazz MD;  Location: [facility];  Service: Gastroenterology;  Laterality: N/A;  pre food bolus  post food bolus  dr cruz ascencio    PROSTATE SURGERY         Review of Systems   Constitutional: Negative.    HENT: Negative.     Eyes: Negative.    Respiratory:  Positive for shortness of breath.    Cardiovascular:  Positive for chest pain.   Gastrointestinal: Negative.    Endocrine: Negative.    Genitourinary: Negative.    Musculoskeletal: Negative.    Skin: Negative.    Allergic/Immunologic: Negative.    Neurological: Negative.    Hematological: Negative.    Psychiatric/Behavioral: Negative.         Objective   Physical Exam  Vitals and nursing note reviewed.   Constitutional:       Appearance: Normal appearance. He is normal weight.   HENT:      Head: Normocephalic and atraumatic.      Nose: Nose normal.      Mouth/Throat:      Mouth: Mucous membranes are moist.   Eyes:      Extraocular Movements: Extraocular movements intact.      Pupils: Pupils are equal, round, and reactive to light.   Cardiovascular:      Rate and Rhythm: Normal rate and regular rhythm.      Pulses: Normal pulses.      Heart sounds: Normal heart sounds.   Pulmonary:      Effort: Pulmonary effort is normal.      Breath sounds: Normal breath sounds.   Abdominal:      General: Abdomen is flat. Bowel sounds are normal.      Palpations: Abdomen is soft.   Musculoskeletal:         General: Normal range of motion.      Cervical back: Normal range of motion and neck supple.   Skin:     General: Skin is warm and dry.      Capillary Refill: Capillary refill takes less than 2 seconds.   Neurological:      General: No focal deficit present.      Mental Status: He is alert and oriented to person, place, and time. Mental status is at baseline.   Psychiatric:         Mood and Affect: Mood normal.         Assessment & Plan   Diagnoses and all orders for this  visit:    1. Coronary artery disease involving coronary bypass graft of native heart without angina pectoris (Primary)  -     PSA DIAGNOSTIC  -     CBC & Differential  -     Comprehensive metabolic panel  -     Lipid Panel With / Chol / HDL Ratio  -     TSH  -     Vitamin B12  -     Urinalysis without microscopic (no culture) - Urine, Clean Catch  -     Ambulatory Referral to Cardiology    2. Dyspnea, unspecified type  -     Complete PFT - Pre & Post Bronchodilator; Future  -     XR Chest PA & Lateral  -     PSA DIAGNOSTIC  -     CBC & Differential  -     Comprehensive metabolic panel  -     Lipid Panel With / Chol / HDL Ratio  -     TSH  -     Vitamin B12  -     Urinalysis without microscopic (no culture) - Urine, Clean Catch  -     Ambulatory Referral to Cardiology    3. Smoker  -     Complete PFT - Pre & Post Bronchodilator; Future  -     XR Chest PA & Lateral  -     PSA DIAGNOSTIC  -     CBC & Differential  -     Comprehensive metabolic panel  -     Lipid Panel With / Chol / HDL Ratio  -     TSH  -     Vitamin B12  -     Urinalysis without microscopic (no culture) - Urine, Clean Catch    4. Screen for colon cancer  -     Ambulatory Referral For Screening Colonoscopy                 Orders Placed This Encounter   Procedures    XR Chest PA & Lateral     Order Specific Question:   Reason for Exam:     Answer:   dyspnea     Order Specific Question:   Does this patient have a diabetic monitoring/medication delivering device on?     Answer:   No     Order Specific Question:   Release to patient     Answer:   Routine Release [[id number]]    PSA DIAGNOSTIC     Order Specific Question:   Release to patient     Answer:   Routine Release [[id number]]    Comprehensive metabolic panel     Order Specific Question:   Release to patient     Answer:   Routine Release [[id number]]    Lipid Panel With / Chol / HDL Ratio     Order Specific Question:   Release to patient     Answer:   Routine Release [[id number]]    TSH     Order  Specific Question:   Release to patient     Answer:   Routine Release [[id number]]    Vitamin B12     Order Specific Question:   Release to patient     Answer:   Routine Release [[id number]]    Urinalysis without microscopic (no culture) - Urine, Clean Catch     Order Specific Question:   Release to patient     Answer:   Routine Release [[id number]]    Ambulatory Referral For Screening Colonoscopy     Referral Priority:   Routine     Referral Type:   Diagnostic Medical     Referral Reason:   Specialty Services Required     Number of Visits Requested:   1    Ambulatory Referral to Cardiology     Referral Priority:   Routine     Referral Type:   Consultation     Referral Reason:   Specialty Services Required     Referred to Provider:   Edinson Hardy MD     Requested Specialty:   Cardiology     Number of Visits Requested:   1    Complete PFT - Pre & Post Bronchodilator     Standing Status:   Future     Standing Expiration Date:   3/8/2025     Order Specific Question:   Testing to Be Performed     Answer:   Complete PFT - Pre & Post Bronchodilator     Order Specific Question:   Release to patient     Answer:   Routine Release [[id number]]    CBC & Differential     Order Specific Question:   Release to patient     Answer:   Routine Release [[id number]]       Follow up: 2 month(s)

## 2024-04-02 ENCOUNTER — OFFICE VISIT (OUTPATIENT)
Dept: FAMILY MEDICINE CLINIC | Facility: CLINIC | Age: 59
End: 2024-04-02
Payer: COMMERCIAL

## 2024-04-02 VITALS
WEIGHT: 140.8 LBS | HEART RATE: 73 BPM | SYSTOLIC BLOOD PRESSURE: 128 MMHG | DIASTOLIC BLOOD PRESSURE: 68 MMHG | OXYGEN SATURATION: 97 % | RESPIRATION RATE: 18 BRPM | HEIGHT: 68 IN | TEMPERATURE: 97.3 F | BODY MASS INDEX: 21.34 KG/M2

## 2024-04-02 DIAGNOSIS — J18.9 PNEUMONIA OF BOTH LUNGS DUE TO INFECTIOUS ORGANISM, UNSPECIFIED PART OF LUNG: Primary | ICD-10-CM

## 2024-04-02 PROCEDURE — 99213 OFFICE O/P EST LOW 20 MIN: CPT | Performed by: NURSE PRACTITIONER

## 2024-04-02 RX ORDER — ALBUTEROL SULFATE 90 UG/1
2 AEROSOL, METERED RESPIRATORY (INHALATION) EVERY 4 HOURS PRN
Qty: 8 G | Refills: 0 | Status: SHIPPED | OUTPATIENT
Start: 2024-04-02

## 2024-04-02 RX ORDER — DEXAMETHASONE 4 MG/1
TABLET ORAL
Qty: 9 TABLET | Refills: 0 | Status: SHIPPED | OUTPATIENT
Start: 2024-04-02 | End: 2024-04-07

## 2024-04-02 RX ORDER — CLINDAMYCIN HYDROCHLORIDE 300 MG/1
300 CAPSULE ORAL 3 TIMES DAILY
Qty: 30 CAPSULE | Refills: 0 | Status: SHIPPED | OUTPATIENT
Start: 2024-04-02 | End: 2024-04-12

## 2024-04-02 NOTE — PROGRESS NOTES
Subjective   Chief Complaint:  Cough and  shortness of breath.    History of Present Illness:  This 58 y.o. male was seen in the office today.    Approximately 2 months ago, he was seen in early February with pneumonia and was given of Levaquin and steroids. He reports he was almost better, but exacerbated the last week. He reports he continues to smoke and has expressed a desire to quit, he does not have  an active plan because he has had difficulty and does not want to think about it. Declines the need for counseling . breath. He reports Dr. Robertson's wanted him to get a chest x-ray,. He has been unable to complete it due to his work commitments exceeding 60 hours.    Allergies   Allergen Reactions    Atorvastatin Myalgia      Current Outpatient Medications on File Prior to Visit   Medication Sig    acetaminophen (TYLENOL) 500 MG tablet Take 2 tablets by mouth.    clopidogrel (PLAVIX) 75 MG tablet Take 1 tablet by mouth Daily.    esomeprazole (nexIUM) 20 MG capsule Take 2 capsules by mouth Daily.    fenofibrate 160 MG tablet TAKE ONE TABLET DAILY    HM Aspirin EC Low Dose 81 MG EC tablet TAKE ONE TABLET DAILY    HYDROcodone-acetaminophen (Norco) 7.5-325 MG per tablet Take 1 tablet by mouth Every 6 (Six) Hours As Needed for Moderate Pain .    metoprolol tartrate (LOPRESSOR) 25 MG tablet Take 1 tablet by mouth 2 (Two) Times a Day.    Naproxen Sodium (ALEVE PO) Take  by mouth As Needed.    nitroglycerin (NITROSTAT) 0.4 MG SL tablet 1 under the tongue as needed for angina, may repeat q5mins for up three doses    rosuvastatin (CRESTOR) 40 MG tablet TAKE 1 TABLET BY MOUTH DAILY.    Symbicort 160-4.5 MCG/ACT inhaler INHALE 2 PUFFS TWICE DAILY. RINSE MOUTH AFTER USE.     No current facility-administered medications on file prior to visit.      Past Medical, Surgical, Social, and Family History:  Past Medical History:   Diagnosis Date    Coronary artery disease     GERD (gastroesophageal reflux disease)     Injury of back   "   Myocardial infarction     Prostate CA     TB (tuberculosis)     50 years ago     Past Surgical History:   Procedure Laterality Date    BACK SURGERY      Patient has had 2 previous back surgeries.     CORONARY ARTERY BYPASS GRAFT      x2    ENDOSCOPY N/A 03/08/2021    Procedure: ESOPHAGOGASTRODUODENOSCOPY WITH ANESTHESIA;  Surgeon: Indra Shabazz MD;  Location: [facility] OR;  Service: Gastroenterology;  Laterality: N/A;  pre food bolus  post food bolus  dr cruz ascencio    PROSTATE SURGERY       Social History     Socioeconomic History    Marital status:    Tobacco Use    Smoking status: Every Day     Current packs/day: 0.25     Types: Cigarettes     Passive exposure: Current    Smokeless tobacco: Never    Tobacco comments:     quit 7/8/21   Vaping Use    Vaping status: Never Used   Substance and Sexual Activity    Alcohol use: No    Drug use: No    Sexual activity: Defer     Family History   Problem Relation Age of Onset    Arthritis Mother     Heart disease Father     Cancer Father     Heart attack Father     Heart disease Maternal Grandfather     Heart attack Maternal Grandfather     Heart disease Paternal Grandfather     Heart attack Paternal Grandfather        Prior Visit Notes/Records, Lab, Imaging, and Diagnostic Results Reviewed:  A1C:No results for input(s): \"HGBA1C\" in the last 73780 hours.  GLUCOSE:  Lab Results - Last 18 Months   Lab Units 05/23/23  0104   GLUCOSE mg/dL 146*     LIPID:No results for input(s): \"CHLPL\", \"LDL\", \"HDL\", \"TRIG\" in the last 86866 hours.  PSA:No results for input(s): \"PSA\" in the last 07653 hours.  CBC:  Lab Results - Last 18 Months   Lab Units 12/09/23  1845 05/23/23  0104   WBC K/uL 10.3 16.70*   HEMOGLOBIN g/dL 14.1 14.4   HEMATOCRIT % 41.1* 41.8   PLATELETS K/uL 230 248      BMP/CMP:  Lab Results - Last 18 Months   Lab Units 05/23/23  0104   SODIUM mmol/L 136   POTASSIUM mmol/L 3.3*   CHLORIDE mmol/L 101   CO2 mmol/L 20.0*   GLUCOSE mg/dL 146*   BUN mg/dL 9 " "  CREATININE mg/dL 0.76   CALCIUM mg/dL 9.2     HEPATIC:  Lab Results - Last 18 Months   Lab Units 05/23/23  0104   ALT (SGPT) U/L 9   AST (SGOT) U/L 17   ALK PHOS U/L 114     Vit D:No results for input(s): \"OLMM65RU\" in the last 17672 hours.  THYROID:No results for input(s): \"TSH\", \"FREET4\", \"FTI\" in the last 59157 hours.    Invalid input(s): \"FREET3\", \"T3\", \"T4\", \"TEUP\", \"TOTALT4\"  BMI Trend:  BMI Readings from Last 10 Encounters:   04/02/24 21.41 kg/m²   03/08/24 22.05 kg/m²   02/06/24 22.05 kg/m²   09/15/23 22.38 kg/m²   08/29/23 22.51 kg/m²   05/30/23 22.96 kg/m²   05/23/23 22.35 kg/m²   12/23/22 24.33 kg/m²   11/28/22 24.33 kg/m²   09/06/22 25.09 kg/m²     Objective   Vital Signs  /68 (BP Location: Left arm, Patient Position: Sitting, Cuff Size: Adult)   Pulse 73   Temp 97.3 °F (36.3 °C) (Infrared)   Resp 18   Ht 172.7 cm (67.99\")   Wt 63.9 kg (140 lb 12.8 oz)   SpO2 97%   BMI 21.41 kg/m²   Physical Exam  Constitutional:       General: He is not in acute distress.  Cardiovascular:      Rate and Rhythm: Normal rate and regular rhythm.      Pulses: Normal pulses.      Heart sounds: S1 normal and S2 normal. No murmur heard.     No friction rub. No gallop.   Pulmonary:      Effort: Pulmonary effort is normal. No respiratory distress.      Breath sounds: Examination of the left-lower field reveals wheezing. Wheezing present. No rhonchi.   Neurological:      Mental Status: He is alert.     Assessment & Plan   Diagnoses and all orders for this visit:    1. Pneumonia of both lungs due to infectious organism, unspecified part of lung (Primary)    Other orders  -     clindamycin (CLEOCIN) 300 MG capsule; Take 1 capsule by mouth 3 (Three) Times a Day for 10 days.  Dispense: 30 capsule; Refill: 0  -     dexAMETHasone (Decadron) 4 MG tablet; Take 1 tablet by mouth 2 (Two) Times a Day for 3 days, THEN 1 tablet Daily for 3 days.  Dispense: 9 tablet; Refill: 0  -     albuterol sulfate  (90 Base) MCG/ACT " inhaler; Inhale 2 puffs Every 4 (Four) Hours As Needed for Wheezing or Shortness of Air.  Dispense: 8 g; Refill: 0    Discussions & Anticipatory Guidance:  Advised and educated plan of care.    The patient will Return for follow-up as needed.    Advised clinical signs of pneumonia. we will treat accordingly. We'll use a different agent rather than Levaquin this time around and I advised patient okay to to still get the chest x-ray.  I have personally performed the services described in this document as transcribed by the above individual, and it is both accurate and complete.    Transcribed from ambient dictation for ALEJANDRA Polanco by Missy Hudson.  04/02/24   18:08 CDT    Electronically signed by Shai Tran, 04/02/24, 6:08 PM CDT.

## 2024-04-16 ENCOUNTER — HOSPITAL ENCOUNTER (OUTPATIENT)
Dept: PULMONOLOGY | Facility: HOSPITAL | Age: 59
Discharge: HOME | End: 2024-04-16
Payer: COMMERCIAL

## 2024-05-14 ENCOUNTER — HOSPITAL ENCOUNTER (OUTPATIENT)
Dept: GENERAL RADIOLOGY | Facility: HOSPITAL | Age: 59
Discharge: HOME | End: 2024-05-14
Admitting: FAMILY MEDICINE
Payer: COMMERCIAL

## 2024-05-14 PROCEDURE — 71046 X-RAY EXAM CHEST 2 VIEWS: CPT

## 2024-05-21 RX ORDER — ALBUTEROL SULFATE 90 UG/1
2 AEROSOL, METERED RESPIRATORY (INHALATION) EVERY 4 HOURS PRN
Qty: 8 G | Refills: 0 | Status: SHIPPED | OUTPATIENT
Start: 2024-05-21

## 2024-05-21 NOTE — TELEPHONE ENCOUNTER
"  Caller: Lucia, Magalie LYNCH \"Kaden\"    Relationship: Self    Best call back number: [phone]     Requested Prescriptions:   Requested Prescriptions     Pending Prescriptions Disp Refills    albuterol sulfate  (90 Base) MCG/ACT inhaler 8 g 0     Sig: Inhale 2 puffs Every 4 (Four) Hours As Needed for Wheezing or Shortness of Air.        Pharmacy where request should be sent: [facility] - [place], [place] - [address] [facility] [phone] [facility] [phone] FX     Last office visit with prescribing clinician: 3/8/2024   Last telemedicine visit with prescribing clinician: Visit date not found   Next office visit with prescribing clinician: Visit date not found     Does the patient have less than a 3 day supply:  [x] Yes  [] No    Would you like a call back once the refill request has been completed: [x] Yes [] No    If the office needs to give you a call back, can they leave a voicemail: [x] Yes [] No    Reji Mota Rep   05/21/24 11:51 CDT       "

## 2024-12-13 ENCOUNTER — TELEPHONE (OUTPATIENT)
Dept: CARDIOLOGY | Facility: CLINIC | Age: 59
End: 2024-12-13
Payer: COMMERCIAL

## 2024-12-13 ENCOUNTER — TELEPHONE (OUTPATIENT)
Dept: FAMILY MEDICINE CLINIC | Facility: CLINIC | Age: 59
End: 2024-12-13
Payer: COMMERCIAL

## 2024-12-13 RX ORDER — NITROGLYCERIN 0.4 MG/1
TABLET SUBLINGUAL
Qty: 6 TABLET | Refills: 0 | Status: SHIPPED | OUTPATIENT
Start: 2024-12-13

## 2024-12-13 NOTE — TELEPHONE ENCOUNTER
He is due a visit with us also.  Okay to refill if he can get on the books.    Electronically signed by ALEJANDRA Polanco, 12/13/24, 12:15 PM CST.

## 2024-12-13 NOTE — TELEPHONE ENCOUNTER
Patient scheduled to see ALEJANDRA Mclain 12/20/2024 @ 08:00 am. Patient advised to seek immediate medical evaluation at nearest ER if he begins having active chest pain or chest pressure again. Magalie voiced understanding.    Thanks  WF

## 2024-12-13 NOTE — TELEPHONE ENCOUNTER
Patient called in and stated he is needing a refill on his nitro and unable to get through to his cardiologist. He is wanting to know if omar would be willing to refill.

## 2024-12-13 NOTE — TELEPHONE ENCOUNTER
OKAY FOR HUB TO RELAY:      Patients wife left  requesting refill's on Magalie's nitroglycerin. Magalie must be seen by cardiology before any further refills can be sent. Magalie canceled his scheduled apt 01/04/2024 and no showed his apt on 02/21/2024. Please schedule patient for next available apt with Dr.Sanjay Hardy or ALEJANDRA Mclain. I've attempted to reach patient without luck 12/13/2024.    Thanks  WF

## 2024-12-20 ENCOUNTER — OFFICE VISIT (OUTPATIENT)
Dept: CARDIOLOGY | Facility: CLINIC | Age: 59
End: 2024-12-20
Payer: COMMERCIAL

## 2024-12-20 VITALS
SYSTOLIC BLOOD PRESSURE: 98 MMHG | WEIGHT: 145 LBS | OXYGEN SATURATION: 98 % | BODY MASS INDEX: 21.98 KG/M2 | HEART RATE: 80 BPM | HEIGHT: 68 IN | DIASTOLIC BLOOD PRESSURE: 60 MMHG

## 2024-12-20 DIAGNOSIS — E78.5 HYPERLIPIDEMIA LDL GOAL <70: ICD-10-CM

## 2024-12-20 DIAGNOSIS — I25.708 CORONARY ARTERY DISEASE OF BYPASS GRAFT OF NATIVE HEART WITH STABLE ANGINA PECTORIS: ICD-10-CM

## 2024-12-20 DIAGNOSIS — R06.09 DYSPNEA ON EXERTION: Primary | ICD-10-CM

## 2024-12-20 DIAGNOSIS — I10 PRIMARY HYPERTENSION: ICD-10-CM

## 2024-12-20 DIAGNOSIS — F17.200 SMOKER: ICD-10-CM

## 2024-12-20 PROCEDURE — 93000 ELECTROCARDIOGRAM COMPLETE: CPT | Performed by: NURSE PRACTITIONER

## 2024-12-20 PROCEDURE — 99214 OFFICE O/P EST MOD 30 MIN: CPT | Performed by: NURSE PRACTITIONER

## 2024-12-20 RX ORDER — FENOFIBRATE 160 MG/1
160 TABLET ORAL DAILY
Qty: 90 TABLET | Refills: 3 | Status: SHIPPED | OUTPATIENT
Start: 2024-12-20

## 2024-12-20 RX ORDER — NITROGLYCERIN 0.4 MG/1
TABLET SUBLINGUAL
Qty: 25 TABLET | Refills: 3 | Status: SHIPPED | OUTPATIENT
Start: 2024-12-20

## 2024-12-20 RX ORDER — ROSUVASTATIN CALCIUM 40 MG/1
40 TABLET, COATED ORAL DAILY
Qty: 90 TABLET | Refills: 3 | Status: SHIPPED | OUTPATIENT
Start: 2024-12-20

## 2024-12-20 NOTE — PROGRESS NOTES
"  Subjective:     Encounter Date: 12/20/2024      Patient ID: Magalie Myers is a 59 y.o. male with coronary artery disease s/p CABG x 2 in [place] 07/2021 (LIMA to LAD and SVG to obtuse marginal branch), Ischemic cardiomyopathy with LVEF 45% at time of CABG, hypertension, hyperlipidemia and smoker    Chief Complaint: chest pain  History of Present Illness  Patient presents today for management of coronary artery disease. Patient reports that he has had a couple of spells while at work last week. He didn't have any NTG. He didn't have a baby aspirin that seemed to help. He reports that he was sitting and riding in the truck. He reports that he stared with chest pain. He reports that it starts in the middle of his chest. He reports that it radiated up to bilateral jaws. He reports that it was present about 30-45 minutes before it was better. He reports that the pain was a dull, sharp pain. 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 He has continued to smoke. He reports that he has heart racing often, especially at work. He reports that he has some intermittent dizziness at times. He denies any leg swelling, orthopnea or PND. He reports that he doesn't check his BP at home. He reports that he has been out of his medications. He got tired of taking them and quit when his prescriptions ran out. He reports being out of medications for the past 6-9 months. Patient follows with Dr Burgos as PCP; he reports that he doesn't go for routine check up.     The following portions of the patient's history were reviewed and updated as appropriate: allergies, current medications, past family history, past medical history, past social history, past surgical history and problem list.    Allergies   Allergen Reactions    Atorvastatin Myalgia     \"Severe joint pain\"       Current Outpatient Medications:     acetaminophen (TYLENOL) 500 MG tablet, " Take 2 tablets by mouth., Disp: , Rfl:     albuterol sulfate  (90 Base) MCG/ACT inhaler, Inhale 2 puffs Every 4 (Four) Hours As Needed for Wheezing or Shortness of Air., Disp: 8 g, Rfl: 0    esomeprazole (nexIUM) 20 MG capsule, Take 2 capsules by mouth Daily., Disp: , Rfl:     fenofibrate 160 MG tablet, Take 1 tablet by mouth Daily., Disp: 90 tablet, Rfl: 3    HM Aspirin EC Low Dose 81 MG EC tablet, TAKE ONE TABLET DAILY, Disp: 90 tablet, Rfl: 2    HYDROcodone-acetaminophen (Norco) 7.5-325 MG per tablet, Take 1 tablet by mouth Every 6 (Six) Hours As Needed for Moderate Pain ., Disp: 68 tablet, Rfl: 0    Naproxen Sodium (ALEVE PO), Take  by mouth As Needed., Disp: , Rfl:     nitroglycerin (NITROSTAT) 0.4 MG SL tablet, 1 under the tongue as needed for angina, may repeat q5mins for up three doses, Disp: 25 tablet, Rfl: 3    rosuvastatin (CRESTOR) 40 MG tablet, Take 1 tablet by mouth Daily., Disp: 90 tablet, Rfl: 3    Symbicort 160-4.5 MCG/ACT inhaler, INHALE 2 PUFFS TWICE DAILY. RINSE MOUTH AFTER USE., Disp: , Rfl:     metoprolol tartrate (LOPRESSOR) 25 MG tablet, Take 1 tablet by mouth 2 (Two) Times a Day. (Patient not taking: Reported on 12/20/2024), Disp: 120 tablet, Rfl: 11      Past Medical History:   Diagnosis Date    Coronary artery disease     GERD (gastroesophageal reflux disease)     Injury of back     Myocardial infarction     Prostate CA     TB (tuberculosis)     50 years ago     Social History     Socioeconomic History    Marital status:    Tobacco Use    Smoking status: Every Day     Current packs/day: 0.25     Types: Cigarettes     Passive exposure: Current    Smokeless tobacco: Never    Tobacco comments:     quit 7/8/21   Vaping Use    Vaping status: Never Used   Substance and Sexual Activity    Alcohol use: No    Drug use: No    Sexual activity: Defer       Review of Systems   Constitutional: Negative for malaise/fatigue, weight gain and weight loss.   HENT:  Negative for nosebleeds.   "  Cardiovascular:  Positive for chest pain and dyspnea on exertion. Negative for irregular heartbeat, leg swelling, near-syncope, orthopnea, palpitations, paroxysmal nocturnal dyspnea and syncope.   Respiratory:  Positive for shortness of breath.    Hematologic/Lymphatic: Bruises/bleeds easily.   Musculoskeletal:  Negative for muscle weakness.   Genitourinary:  Negative for hematuria.   Neurological:  Positive for dizziness. Negative for weakness.   All other systems reviewed and are negative.         Objective:     Vitals reviewed.   Constitutional:       General: Not in acute distress.     Appearance: Normal appearance. Well-developed.   Eyes:      Pupils: Pupils are equal, round, and reactive to light.   HENT:      Head: Normocephalic and atraumatic.      Nose: Nose normal.   Neck:      Vascular: No carotid bruit.   Pulmonary:      Effort: Pulmonary effort is normal. No respiratory distress.      Breath sounds: Normal breath sounds. No wheezing. No rales.   Cardiovascular:      Normal rate. Regular rhythm.      Murmurs: There is no murmur.   Edema:     Peripheral edema absent.   Abdominal:      General: There is no distension.      Palpations: Abdomen is soft.   Musculoskeletal: Normal range of motion.      Cervical back: Normal range of motion and neck supple. Skin:     General: Skin is warm.      Findings: No erythema or rash.   Neurological:      General: No focal deficit present.      Mental Status: Alert and oriented to person, place, and time.   Psychiatric:         Attention and Perception: Attention normal.         Mood and Affect: Mood normal.         Speech: Speech normal.         Behavior: Behavior normal.         Thought Content: Thought content normal.         Judgment: Judgment normal.         BP 98/60   Pulse 80   Ht 172.7 cm (67.99\")   Wt 65.8 kg (145 lb)   SpO2 98%   BMI 22.05 kg/m²       EKG    Date/Time: 12/20/2024 8:50 AM  Performed by: Stephane Head APRN    Authorized by: Adilene, " ALEJANDRA Calderón  Comparison: compared with previous ECG from 5/23/2023  Similar to previous ECG  Rhythm: sinus rhythm  Rate: normal  BPM: 61  Other findings: non-specific ST-T wave changes    Clinical impression: non-specific ECG          Lab Review:       Lab Results   Component Value Date    CHLPL 179 09/06/2022    TRIG 373 (H) 09/06/2022    HDL 24 (L) 09/06/2022    LDL 93 09/06/2022     Nuclear stress test 7/01/2022:  Interpretation Summary     Diaphragmatic attenuation and GI artifacts are present.  Myocardial perfusion imaging indicates a normal myocardial perfusion study with no evidence of ischemia.  Left ventricular ejection fraction is normal. (Calculated EF = 58%).  Impressions are consistent with a low risk study.     I have personally reviewed labs, nuclear stress test and past office notes prior to patients visit  Assessment:          Diagnosis Plan   1. Dyspnea on exertion  Adult Transthoracic Echo Complete w/ Color, Spectral and Contrast if necessary per protocol    Stress Test With Myocardial Perfusion (1 Day)      2. Coronary artery disease of bypass graft of native heart with stable angina pectoris  Stress Test With Myocardial Perfusion (1 Day)      3. Primary hypertension        4. Hyperlipidemia LDL goal <70  rosuvastatin (CRESTOR) 40 MG tablet    fenofibrate 160 MG tablet    Lipid Panel      5. Smoker                   Plan:       1. Dyspnea on exertion: Patient reports that this has worsened over the past year. Will order an echo for further evaluation. Discussed with patient that this may be due to continuing to smoke and deconditioning as well.     2. CAD: s/p CABG x 2 in [place] 07/2021 (LIMA to LAD and SVG to obtuse marginal branch). Nuclear stress test 7/1/2022 was low risk for ischemia. Patient has been experiencing chest pain. He reports that he stopped taking his medications about 6-9 months ago. Will order a nuclear stress test for further evaluation. Counseled paitent on importance of  medication compliance. Recommend to restart aspirin and rosuvastatin. BP in office today is low. Will not restart metoprolol at this time. Encouraged patient to keep a BP log and will discuss further at follow up. NTG sent for patient. Discussed if he experiences severe chest pain that is unrelieved by NTG then it is recommended to proceed to ER for further evaluation.  .     3. Hypertension: Low BP in office today. Patient has been off medication for the past 6-9 months. Will not restart metoprolol at this time. Encouraged patient to keep a BP log and will discuss further at follow up.    4. Hyperlipidemia: Lipid panel on 9/2022 showed improved control of cholesterol. LDL at 93. Triglycerides still elevated. Will reorder Lipid panel today. Restarted patient on rosuvastatin and fenofibrate today.     5. Tobacco abuse:  Magalie Myers  reports that he has been smoking cigarettes. He has been exposed to tobacco smoke. He has never used smokeless tobacco.. I have educated him on the risk of diseases from using tobacco products such as cancer, COPD and heart disease. I advised him to quit and he is not willing to quit. I spent 3  minutes counseling the patient.    I attest that all portions of this note reviewed and all information has been updated by myself to reflect the patient's current status.      I spent 38 minutes caring for Magalie on this date of service. This time includes time spent by me in the following activities:preparing for the visit, reviewing tests, obtaining and/or reviewing a separately obtained history, performing a medically appropriate examination and/or evaluation , counseling and educating the patient/family/caregiver, ordering medications, tests, or procedures, and documenting information in the medical record    I spent 2 minutes on the separately reported service of EKG. This time is not included in the time used to support the E/M service also reported today    Patient is to follow up in  office in  6-8 weeks or sooner if needed

## 2025-01-03 ENCOUNTER — HOSPITAL ENCOUNTER (OUTPATIENT)
Dept: CARDIOLOGY | Facility: HOSPITAL | Age: 60
Discharge: HOME | End: 2025-01-03
Admitting: NURSE PRACTITIONER
Payer: COMMERCIAL

## 2025-01-03 VITALS
HEIGHT: 63 IN | BODY MASS INDEX: 25.69 KG/M2 | SYSTOLIC BLOOD PRESSURE: 98 MMHG | WEIGHT: 145 LBS | DIASTOLIC BLOOD PRESSURE: 60 MMHG

## 2025-01-03 DIAGNOSIS — R06.09 DYSPNEA ON EXERTION: ICD-10-CM

## 2025-01-03 LAB
AV MEAN PRESS GRAD SYS DOP V1V2: 4.7 MMHG
AV VMAX SYS DOP: 151 CM/SEC
BH CV ECHO LEFT VENTRICLE GLOBAL LONGITUDINAL STRAIN: -16.6 %
BH CV ECHO MEAS - AO MAX PG: 9.1 MMHG
BH CV ECHO MEAS - AO ROOT DIAM: 3 CM
BH CV ECHO MEAS - AO V2 VTI: 29.5 CM
BH CV ECHO MEAS - AVA(I,D): 3.4 CM2
BH CV ECHO MEAS - EDV(CUBED): 132.7 ML
BH CV ECHO MEAS - EDV(MOD-SP2): 88.5 ML
BH CV ECHO MEAS - EDV(MOD-SP4): 56.4 ML
BH CV ECHO MEAS - EF(MOD-SP2): 62.8 %
BH CV ECHO MEAS - EF(MOD-SP4): 46.8 %
BH CV ECHO MEAS - ESV(CUBED): 39.3 ML
BH CV ECHO MEAS - ESV(MOD-SP2): 32.9 ML
BH CV ECHO MEAS - ESV(MOD-SP4): 30 ML
BH CV ECHO MEAS - FS: 33.3 %
BH CV ECHO MEAS - IVS/LVPW: 0.89 CM
BH CV ECHO MEAS - IVSD: 0.8 CM
BH CV ECHO MEAS - LA DIMENSION: 3.5 CM
BH CV ECHO MEAS - LAT PEAK E' VEL: 15.8 CM/SEC
BH CV ECHO MEAS - LV DIASTOLIC VOL/BSA (35-75): 31.6 CM2
BH CV ECHO MEAS - LV MASS(C)D: 151.8 GRAMS
BH CV ECHO MEAS - LV MAX PG: 5.9 MMHG
BH CV ECHO MEAS - LV MEAN PG: 3 MMHG
BH CV ECHO MEAS - LV SYSTOLIC VOL/BSA (12-30): 16.8 CM2
BH CV ECHO MEAS - LV V1 MAX: 121.2 CM/SEC
BH CV ECHO MEAS - LV V1 VTI: 26.3 CM
BH CV ECHO MEAS - LVIDD: 5.1 CM
BH CV ECHO MEAS - LVIDS: 3.4 CM
BH CV ECHO MEAS - LVOT AREA: 3.8 CM2
BH CV ECHO MEAS - LVOT DIAM: 2.2 CM
BH CV ECHO MEAS - LVPWD: 0.9 CM
BH CV ECHO MEAS - MED PEAK E' VEL: 7.9 CM/SEC
BH CV ECHO MEAS - MV A MAX VEL: 74.6 CM/SEC
BH CV ECHO MEAS - MV DEC SLOPE: 320 CM/SEC2
BH CV ECHO MEAS - MV DEC TIME: 0.21 SEC
BH CV ECHO MEAS - MV E MAX VEL: 77.1 CM/SEC
BH CV ECHO MEAS - MV E/A: 1.03
BH CV ECHO MEAS - MV MAX PG: 2.7 MMHG
BH CV ECHO MEAS - MV MEAN PG: 2 MMHG
BH CV ECHO MEAS - MV P1/2T: 79.5 MSEC
BH CV ECHO MEAS - MV V2 VTI: 28.2 CM
BH CV ECHO MEAS - MVA(P1/2T): 2.8 CM2
BH CV ECHO MEAS - MVA(VTI): 3.5 CM2
BH CV ECHO MEAS - PA V2 MAX: 81 CM/SEC
BH CV ECHO MEAS - RAP SYSTOLE: 8 MMHG
BH CV ECHO MEAS - RVSP: 19.8 MMHG
BH CV ECHO MEAS - SV(LVOT): 100 ML
BH CV ECHO MEAS - SV(MOD-SP2): 55.6 ML
BH CV ECHO MEAS - SV(MOD-SP4): 26.4 ML
BH CV ECHO MEAS - SVI(LVOT): 56.1 ML/M2
BH CV ECHO MEAS - SVI(MOD-SP2): 31.2 ML/M2
BH CV ECHO MEAS - SVI(MOD-SP4): 14.8 ML/M2
BH CV ECHO MEAS - TAPSE (>1.6): 1.98 CM
BH CV ECHO MEAS - TR MAX PG: 11.8 MMHG
BH CV ECHO MEAS - TR MAX VEL: 172 CM/SEC
BH CV ECHO MEASUREMENTS AVERAGE E/E' RATIO: 6.51
BH CV XLRA - TDI S': 10.2 CM/SEC
LEFT ATRIUM VOLUME INDEX: 21.6 ML/M2
LV EF BIPLANE MOD: 57.9 %

## 2025-01-03 PROCEDURE — 93306 TTE W/DOPPLER COMPLETE: CPT

## 2025-01-03 PROCEDURE — 93356 MYOCRD STRAIN IMG SPCKL TRCK: CPT

## 2025-01-08 ENCOUNTER — APPOINTMENT (OUTPATIENT)
Dept: GENERAL RADIOLOGY | Facility: HOSPITAL | Age: 60
End: 2025-01-08
Payer: COMMERCIAL

## 2025-01-08 ENCOUNTER — HOSPITAL ENCOUNTER (EMERGENCY)
Facility: HOSPITAL | Age: 60
Discharge: HOME | End: 2025-01-08
Admitting: FAMILY MEDICINE
Payer: COMMERCIAL

## 2025-01-08 VITALS
BODY MASS INDEX: 22.73 KG/M2 | TEMPERATURE: 97.9 F | HEIGHT: 68 IN | OXYGEN SATURATION: 96 % | WEIGHT: 150 LBS | RESPIRATION RATE: 16 BRPM | SYSTOLIC BLOOD PRESSURE: 136 MMHG | DIASTOLIC BLOOD PRESSURE: 76 MMHG | HEART RATE: 76 BPM

## 2025-01-08 DIAGNOSIS — R07.9 CHEST PAIN, UNSPECIFIED TYPE: Primary | ICD-10-CM

## 2025-01-08 LAB
ALBUMIN SERPL-MCNC: 4.3 G/DL (ref 3.5–5.2)
ALBUMIN/GLOB SERPL: 1.4 G/DL
ALP SERPL-CCNC: 106 U/L (ref 39–117)
ALT SERPL W P-5'-P-CCNC: 10 U/L (ref 1–41)
ANION GAP SERPL CALCULATED.3IONS-SCNC: 11 MMOL/L (ref 5–15)
APTT PPP: 31.5 SECONDS (ref 24.5–36)
AST SERPL-CCNC: 18 U/L (ref 1–40)
BASOPHILS # BLD AUTO: 0.07 10*3/MM3 (ref 0–0.2)
BASOPHILS NFR BLD AUTO: 0.8 % (ref 0–1.5)
BILIRUB SERPL-MCNC: 0.5 MG/DL (ref 0–1.2)
BUN SERPL-MCNC: 9 MG/DL (ref 6–20)
BUN/CREAT SERPL: 14.3 (ref 7–25)
CALCIUM SPEC-SCNC: 9.5 MG/DL (ref 8.6–10.5)
CHLORIDE SERPL-SCNC: 98 MMOL/L (ref 98–107)
CO2 SERPL-SCNC: 25 MMOL/L (ref 22–29)
CREAT SERPL-MCNC: 0.63 MG/DL (ref 0.76–1.27)
D DIMER PPP FEU-MCNC: <0.27 MCGFEU/ML (ref 0–0.59)
DEPRECATED RDW RBC AUTO: 41 FL (ref 37–54)
EGFRCR SERPLBLD CKD-EPI 2021: 109.6 ML/MIN/1.73
EOSINOPHIL # BLD AUTO: 0.36 10*3/MM3 (ref 0–0.4)
EOSINOPHIL NFR BLD AUTO: 4.4 % (ref 0.3–6.2)
ERYTHROCYTE [DISTWIDTH] IN BLOOD BY AUTOMATED COUNT: 12.7 % (ref 12.3–15.4)
GEN 5 1HR TROPONIN T REFLEX: <6 NG/L
GLOBULIN UR ELPH-MCNC: 3 GM/DL
GLUCOSE SERPL-MCNC: 112 MG/DL (ref 65–99)
HCT VFR BLD AUTO: 42.9 % (ref 37.5–51)
HGB BLD-MCNC: 14.5 G/DL (ref 13–17.7)
HOLD SPECIMEN: NORMAL
HOLD SPECIMEN: NORMAL
IMM GRANULOCYTES # BLD AUTO: 0.03 10*3/MM3 (ref 0–0.05)
IMM GRANULOCYTES NFR BLD AUTO: 0.4 % (ref 0–0.5)
INR PPP: 0.94 (ref 0.91–1.09)
LYMPHOCYTES # BLD AUTO: 2.49 10*3/MM3 (ref 0.7–3.1)
LYMPHOCYTES NFR BLD AUTO: 30.2 % (ref 19.6–45.3)
MCH RBC QN AUTO: 29.7 PG (ref 26.6–33)
MCHC RBC AUTO-ENTMCNC: 33.8 G/DL (ref 31.5–35.7)
MCV RBC AUTO: 87.7 FL (ref 79–97)
MONOCYTES # BLD AUTO: 0.55 10*3/MM3 (ref 0.1–0.9)
MONOCYTES NFR BLD AUTO: 6.7 % (ref 5–12)
NEUTROPHILS NFR BLD AUTO: 4.74 10*3/MM3 (ref 1.7–7)
NEUTROPHILS NFR BLD AUTO: 57.5 % (ref 42.7–76)
NRBC BLD AUTO-RTO: 0 /100 WBC (ref 0–0.2)
PLATELET # BLD AUTO: 194 10*3/MM3 (ref 140–450)
PMV BLD AUTO: 10.7 FL (ref 6–12)
POTASSIUM SERPL-SCNC: 3.8 MMOL/L (ref 3.5–5.2)
PROT SERPL-MCNC: 7.3 G/DL (ref 6–8.5)
PROTHROMBIN TIME: 13.1 SECONDS (ref 11.8–14.8)
RBC # BLD AUTO: 4.89 10*6/MM3 (ref 4.14–5.8)
SODIUM SERPL-SCNC: 134 MMOL/L (ref 136–145)
TROPONIN T NUMERIC DELTA: NORMAL
TROPONIN T SERPL HS-MCNC: <6 NG/L
WBC NRBC COR # BLD AUTO: 8.24 10*3/MM3 (ref 3.4–10.8)
WHOLE BLOOD HOLD COAG: NORMAL
WHOLE BLOOD HOLD SPECIMEN: NORMAL

## 2025-01-08 PROCEDURE — 80053 COMPREHEN METABOLIC PANEL: CPT | Performed by: NURSE PRACTITIONER

## 2025-01-08 PROCEDURE — 85379 FIBRIN DEGRADATION QUANT: CPT | Performed by: NURSE PRACTITIONER

## 2025-01-08 PROCEDURE — 84484 ASSAY OF TROPONIN QUANT: CPT | Performed by: NURSE PRACTITIONER

## 2025-01-08 PROCEDURE — 93010 ELECTROCARDIOGRAM REPORT: CPT | Performed by: INTERNAL MEDICINE

## 2025-01-08 PROCEDURE — 36415 COLL VENOUS BLD VENIPUNCTURE: CPT

## 2025-01-08 PROCEDURE — 85025 COMPLETE CBC W/AUTO DIFF WBC: CPT | Performed by: NURSE PRACTITIONER

## 2025-01-08 PROCEDURE — 93005 ELECTROCARDIOGRAM TRACING: CPT | Performed by: EMERGENCY MEDICINE

## 2025-01-08 PROCEDURE — 71045 X-RAY EXAM CHEST 1 VIEW: CPT

## 2025-01-08 PROCEDURE — 85730 THROMBOPLASTIN TIME PARTIAL: CPT | Performed by: NURSE PRACTITIONER

## 2025-01-08 PROCEDURE — 99284 EMERGENCY DEPT VISIT MOD MDM: CPT

## 2025-01-08 PROCEDURE — 85610 PROTHROMBIN TIME: CPT | Performed by: NURSE PRACTITIONER

## 2025-01-08 PROCEDURE — 93005 ELECTROCARDIOGRAM TRACING: CPT

## 2025-01-08 RX ORDER — SODIUM CHLORIDE 0.9 % (FLUSH) 0.9 %
10 SYRINGE (ML) INJECTION AS NEEDED
Status: DISCONTINUED | OUTPATIENT
Start: 2025-01-08 | End: 2025-01-08 | Stop reason: HOSPADM

## 2025-01-08 NOTE — DISCHARGE INSTRUCTIONS
F/u with pcp with continued sxs and /or return to the ER  F/u with outpatient stress study as scheduled

## 2025-01-08 NOTE — ED PROVIDER NOTES
Subjective   History of Present Illness  Patient is a 59-year-old male who presents to the ER with chief complaints of chest pain.  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  He describes the pain as a sharp pressure type sensation associated with shortness of breath.  He has had similar chest pain recently.  Patient is followed by Dr. Hardy with cardiology.  He has history of a CABG 3 years ago.  Patient states that he accidentally washed his nitroglycerin that was in his blue jeans pocket and had been out of his nitroglycerin.  He recently got this refilled.  Patient took a nitroglycerin prior to arrival and believes this did help some of his chest pain symptoms.  Patient denies any cough or congestion or recorded fevers.  Patient had a transthoracic echo performed on January 3, 2025 with findings described below:     Left ventricular systolic function is normal. Left ventricular ejection fraction appears to be 56 - 60%.  ·  Left ventricular diastolic function was normal.  ·  Normal right ventricular cavity size and systolic function noted.  ·  There is no significant (greater than mild) valvular dysfunction.     He has an outpatient perfusion stress study ordered for his complaints of intermittent chronic chest pain and shortness of breath.  Due to symptoms described above he came to the ER for evaluation and treatment.  Past medical history significant for coronary artery disease, GERD, MI, prostate cancer, TB        Review of Systems   Constitutional: Negative.  Negative for fever.   HENT: Negative.  Negative for congestion.    Respiratory:  Positive for shortness of breath. Negative for cough.    Cardiovascular:  Positive for chest pain.   Gastrointestinal: Negative.  Negative for abdominal pain, constipation, diarrhea, nausea and vomiting.   Genitourinary: Negative.  Negative for dysuria.   Musculoskeletal: Negative.  Negative for back pain.   Skin:  "Negative.  Negative for rash.   All other systems reviewed and are negative.      Past Medical History:   Diagnosis Date    Coronary artery disease     GERD (gastroesophageal reflux disease)     Injury of back     Myocardial infarction     Prostate CA     TB (tuberculosis)     50 years ago       Allergies   Allergen Reactions    Atorvastatin Myalgia     \"Severe joint pain\"       Past Surgical History:   Procedure Laterality Date    BACK SURGERY      Patient has had 2 previous back surgeries.     CORONARY ARTERY BYPASS GRAFT      x2    ENDOSCOPY N/A 03/08/2021    Procedure: ESOPHAGOGASTRODUODENOSCOPY WITH ANESTHESIA;  Surgeon: Indra Shabazz MD;  Location: [facility] OR;  Service: Gastroenterology;  Laterality: N/A;  pre food bolus  post food bolus  dr cruz ascencio    PROSTATE SURGERY         Family History   Problem Relation Age of Onset    Arthritis Mother     Heart disease Father     Cancer Father     Heart attack Father     Heart disease Maternal Grandfather     Heart attack Maternal Grandfather     Heart disease Paternal Grandfather     Heart attack Paternal Grandfather        Social History     Socioeconomic History    Marital status:    Tobacco Use    Smoking status: Every Day     Current packs/day: 0.25     Types: Cigarettes     Passive exposure: Current    Smokeless tobacco: Never    Tobacco comments:     quit 7/8/21   Vaping Use    Vaping status: Never Used   Substance and Sexual Activity    Alcohol use: No    Drug use: No    Sexual activity: Defer           Objective   Physical Exam  Vitals and nursing note reviewed.   Constitutional:       General: He is not in acute distress.     Appearance: He is well-developed. He is not diaphoretic.   HENT:      Head: Atraumatic.      Nose: Nose normal.   Eyes:      General: No scleral icterus.     Conjunctiva/sclera: Conjunctivae normal.      Pupils: Pupils are equal, round, and reactive to light.   Neck:      Thyroid: No thyromegaly.      Vascular: No JVD. "   Cardiovascular:      Rate and Rhythm: Normal rate and regular rhythm.      Heart sounds: Normal heart sounds. No murmur heard.  Pulmonary:      Effort: Pulmonary effort is normal. No respiratory distress.      Breath sounds: Normal breath sounds. No wheezing or rales.   Chest:      Chest wall: No tenderness.   Abdominal:      General: Bowel sounds are normal. There is no distension.      Palpations: Abdomen is soft. There is no mass.      Tenderness: There is no abdominal tenderness. There is no guarding or rebound.   Musculoskeletal:         General: Normal range of motion.      Cervical back: Normal range of motion and neck supple.   Lymphadenopathy:      Cervical: No cervical adenopathy.   Skin:     General: Skin is warm and dry.      Coloration: Skin is not pale.      Findings: No erythema or rash.   Neurological:      Mental Status: He is alert and oriented to person, place, and time.      Cranial Nerves: No cranial nerve deficit.      Coordination: Coordination normal.      Deep Tendon Reflexes: Reflexes are normal and symmetric.   Psychiatric:         Behavior: Behavior normal.         Thought Content: Thought content normal.         Judgment: Judgment normal.         Procedures           ED Course                  HEART Score: 3   Shared Decision Making  I discussed the findings with the patient/patient representative who is in agreement with the treatment plan and the final disposition.  Risks and benefits of discharge and/or observation/admission were discussed: Yes                                      Medical Decision Making  Patient is a 59-year-old male who presents to the ER with chief complaints of chest pain.  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  He describes the pain as a sharp pressure type sensation associated with shortness of breath.  He has had similar chest pain recently.  Patient is followed by Dr. Hardy with cardiology.  He has  history of a CABG 3 years ago.  Patient states that he accidentally washed his nitroglycerin that was in his blue jeans pocket and had been out of his nitroglycerin.  He recently got this refilled.  Patient took a nitroglycerin prior to arrival and believes this did help some of his chest pain symptoms.  Patient denies any cough or congestion or recorded fevers.  Patient had a transthoracic echo performed on January 3, 2025 with findings described below:     Left ventricular systolic function is normal. Left ventricular ejection fraction appears to be 56 - 60%.  ·  Left ventricular diastolic function was normal.  ·  Normal right ventricular cavity size and systolic function noted.  ·  There is no significant (greater than mild) valvular dysfunction.     He has an outpatient perfusion stress study ordered for his complaints of intermittent chronic chest pain and shortness of breath.  Due to symptoms described above he came to the ER for evaluation and treatment.  Past medical history significant for coronary artery disease, GERD, MI, prostate cancer, TB    Differential diagnosis includes but not limited to ACS, PE, angina, pneumonia, and other etiologies    Problems Addressed:  Chest pain, unspecified type: complicated acute illness or injury    Amount and/or Complexity of Data Reviewed  Labs: ordered.  Radiology: ordered.      Labs Reviewed   COMPREHENSIVE METABOLIC PANEL - Abnormal; Notable for the following components:       Result Value    Glucose 112 (*)     Creatinine 0.63 (*)     Sodium 134 (*)     All other components within normal limits    Narrative:     GFR Categories in Chronic Kidney Disease (CKD)      GFR Category          GFR (mL/min/1.73)    Interpretation  G1                     90 or greater         Normal or high (1)  G2                      60-89                Mild decrease (1)  G3a                   45-59                Mild to moderate decrease  G3b                   30-44                 "Moderate to severe decrease  G4                    15-29                Severe decrease  G5                    14 or less           Kidney failure          (1)In the absence of evidence of kidney disease, neither GFR category G1 or G2 fulfill the criteria for CKD.    eGFR calculation 2021 CKD-EPI creatinine equation, which does not include race as a factor   APTT - Normal   PROTIME-INR - Normal   D-DIMER, QUANTITATIVE - Normal    Narrative:     According to the assay 's published package insert, a normal (<0.50 MCGFEU/mL) D-dimer result in conjunction with a non-high clinical probability assessment, excludes deep vein thrombosis (DVT) and pulmonary embolism (PE) with high sensitivity.    D-dimer values increase with age and this can make VTE exclusion of an older population difficult. To address this, the American College [employer], based on best available evidence and recent guidelines, recommends that clinicians use age-adjusted D-dimer thresholds in patients greater than 50 years of age with: a) a low probability of PE who do not meet all Pulmonary Embolism Rule Out Criteria, or b) in those with intermediate probability of PE.   The formula for an age-adjusted D-dimer cut-off is \"age/100\".  For example, a 60 year old patient would have an age-adjusted cut-off of 0.60 MCGFEU/mL and an 80 year old 0.80 MCGFEU/mL.   TROPONIN - Normal    Narrative:     High Sensitive Troponin T Reference Range:  <14.0 ng/L- Negative Female for AMI  <22.0 ng/L- Negative Male for AMI  >=14 - Abnormal Female indicating possible myocardial injury.  >=22 - Abnormal Male indicating possible myocardial injury.   Clinicians would have to utilize clinical acumen, EKG, Troponin, and serial changes to determine if it is an Acute Myocardial Infarction or myocardial injury due to an underlying chronic condition.        CBC WITH AUTO DIFFERENTIAL - Normal   RAINBOW DRAW    Narrative:     The following orders were created for panel " order [facility] Draw.  Procedure                               Abnormality         Status                     ---------                               -----------         ------                     Green Top (Gel)[[id number]]                                  Final result               Lavender Top[[id number]]                                     Final result               Red Top[[id number]]                                          Final result               Light Blue Top[[id number]]                                   Final result                 Please view results for these tests on the individual orders.   HIGH SENSITIVITIY TROPONIN T 1HR    Narrative:     High Sensitive Troponin T Reference Range:  <14.0 ng/L- Negative Female for AMI  <22.0 ng/L- Negative Male for AMI  >=14 - Abnormal Female indicating possible myocardial injury.  >=22 - Abnormal Male indicating possible myocardial injury.   Clinicians would have to utilize clinical acumen, EKG, Troponin, and serial changes to determine if it is an Acute Myocardial Infarction or myocardial injury due to an underlying chronic condition.        GREEN TOP   LAVENDER TOP   RED TOP   LIGHT BLUE TOP   CBC AND DIFFERENTIAL    Narrative:     The following orders were created for panel order CBC & Differential.  Procedure                               Abnormality         Status                     ---------                               -----------         ------                     CBC Auto Differential[[id number]]        Normal              Final result                 Please view results for these tests on the individual orders.      XR Chest 1 View   Final Result   1. No active cardiopulmonary disease.           This report was signed and finalized on 1/8/2025 9:51 AM by Dr. Barbie Núñez MD.          Labs are unremarkable.  2 sets of cardiac markers were negative.  EKGs were within normal limits.  We attempted to do the perfusion stress study today in the emergency  department however patient had drank caffeine prior to arrival and apparently this will cause false readings on the study therefore patient will need to have this performed as an outpatient as scheduled.  Patient denies any chest pain or shortness of breath on reexam.  He is anxious for discharge.  He will be discharged home shortly in stable condition. Discussed with Dr. Rosales who agrees with treatment plan.  Final diagnoses:   Chest pain, unspecified type       ED Disposition  ED Disposition       ED Disposition   Discharge    Condition   Good    Comment   --               No follow-up provider specified.       Medication List      No changes were made to your prescriptions during this visit.            Shirley Anderson, APRN  01/08/25 1800

## 2025-01-08 NOTE — Clinical Note
[facility] EMERGENCY DEPARTMENT  [address]  [facility] [address]  Phone: [phone]    Magalie Myers was seen and treated in our emergency department on 1/8/2025.  He may return to work on 01/13/2025.         Thank you for choosing [facility].    Shirley Anderson, APRN

## 2025-01-09 LAB
QT INTERVAL: 408 MS
QT INTERVAL: 420 MS
QTC INTERVAL: 422 MS
QTC INTERVAL: 455 MS

## 2025-01-27 ENCOUNTER — HOSPITAL ENCOUNTER (OUTPATIENT)
Dept: CARDIOLOGY | Facility: HOSPITAL | Age: 60
Discharge: HOME | End: 2025-01-27
Payer: COMMERCIAL

## 2025-01-27 VITALS
WEIGHT: 149.91 LBS | HEART RATE: 68 BPM | HEIGHT: 68 IN | BODY MASS INDEX: 22.72 KG/M2 | DIASTOLIC BLOOD PRESSURE: 78 MMHG | SYSTOLIC BLOOD PRESSURE: 128 MMHG

## 2025-01-27 DIAGNOSIS — R06.09 DYSPNEA ON EXERTION: ICD-10-CM

## 2025-01-27 DIAGNOSIS — I25.708 CORONARY ARTERY DISEASE OF BYPASS GRAFT OF NATIVE HEART WITH STABLE ANGINA PECTORIS: ICD-10-CM

## 2025-01-27 PROCEDURE — 34310000005 TECHNETIUM TETROFOSMIN KIT: Performed by: NURSE PRACTITIONER

## 2025-01-27 PROCEDURE — A9502 TC99M TETROFOSMIN: HCPCS | Performed by: NURSE PRACTITIONER

## 2025-01-27 PROCEDURE — 93016 CV STRESS TEST SUPVJ ONLY: CPT | Performed by: INTERNAL MEDICINE

## 2025-01-27 PROCEDURE — 93018 CV STRESS TEST I&R ONLY: CPT | Performed by: INTERNAL MEDICINE

## 2025-01-27 PROCEDURE — 78452 HT MUSCLE IMAGE SPECT MULT: CPT | Performed by: INTERNAL MEDICINE

## 2025-01-27 PROCEDURE — 93017 CV STRESS TEST TRACING ONLY: CPT

## 2025-01-27 PROCEDURE — 78452 HT MUSCLE IMAGE SPECT MULT: CPT

## 2025-01-27 PROCEDURE — 25010000002 REGADENOSON 0.4 MG/5ML SOLUTION: Performed by: INTERNAL MEDICINE

## 2025-01-27 RX ORDER — REGADENOSON 0.08 MG/ML
0.4 INJECTION, SOLUTION INTRAVENOUS ONCE
Status: COMPLETED | OUTPATIENT
Start: 2025-01-27 | End: 2025-01-27

## 2025-01-27 RX ADMIN — TETROFOSMIN 1 DOSE: 1.38 INJECTION, POWDER, LYOPHILIZED, FOR SOLUTION INTRAVENOUS at 10:12

## 2025-01-27 RX ADMIN — TETROFOSMIN 1 DOSE: 1.38 INJECTION, POWDER, LYOPHILIZED, FOR SOLUTION INTRAVENOUS at 07:50

## 2025-01-27 RX ADMIN — REGADENOSON 0.4 MG: 0.08 INJECTION, SOLUTION INTRAVENOUS at 09:02

## 2025-01-28 LAB
BH CV NUCLEAR PRIOR STUDY: 3
BH CV REST NUCLEAR ISOTOPE DOSE: 10.8 MCI
BH CV STRESS BP STAGE 1: NORMAL
BH CV STRESS COMMENTS STAGE 1: NORMAL
BH CV STRESS DOSE REGADENOSON STAGE 1: 0.4
BH CV STRESS DURATION MIN STAGE 1: 0
BH CV STRESS DURATION SEC STAGE 1: 10
BH CV STRESS HR STAGE 1: 88
BH CV STRESS NUCLEAR ISOTOPE DOSE: 35.3 MCI
BH CV STRESS PROTOCOL 1: NORMAL
BH CV STRESS RECOVERY BP: NORMAL MMHG
BH CV STRESS RECOVERY HR: 76 BPM
BH CV STRESS STAGE 1: 1
MAXIMAL PREDICTED HEART RATE: 161 BPM
PERCENT MAX PREDICTED HR: 54.66 %
SPECT HRT GATED+EF W RNC IV: 59 %
STRESS BASELINE BP: NORMAL MMHG
STRESS BASELINE HR: 64 BPM
STRESS PERCENT HR: 64 %
STRESS POST EXERCISE DUR MIN: 0 MIN
STRESS POST EXERCISE DUR SEC: 10 SEC
STRESS POST PEAK BP: NORMAL MMHG
STRESS POST PEAK HR: 88 BPM
STRESS TARGET HR: 137 BPM

## 2025-02-03 ENCOUNTER — OFFICE VISIT (OUTPATIENT)
Dept: CARDIOLOGY | Facility: CLINIC | Age: 60
End: 2025-02-03
Payer: COMMERCIAL

## 2025-02-03 VITALS
HEIGHT: 68 IN | SYSTOLIC BLOOD PRESSURE: 132 MMHG | BODY MASS INDEX: 21.98 KG/M2 | WEIGHT: 145 LBS | HEART RATE: 78 BPM | OXYGEN SATURATION: 94 % | DIASTOLIC BLOOD PRESSURE: 58 MMHG

## 2025-02-03 DIAGNOSIS — I10 PRIMARY HYPERTENSION: ICD-10-CM

## 2025-02-03 DIAGNOSIS — I25.708 CORONARY ARTERY DISEASE OF BYPASS GRAFT OF NATIVE HEART WITH STABLE ANGINA PECTORIS: Primary | ICD-10-CM

## 2025-02-03 DIAGNOSIS — E78.5 HYPERLIPIDEMIA LDL GOAL <70: ICD-10-CM

## 2025-02-03 DIAGNOSIS — F17.200 SMOKER: ICD-10-CM

## 2025-02-03 PROCEDURE — 99214 OFFICE O/P EST MOD 30 MIN: CPT | Performed by: NURSE PRACTITIONER

## 2025-02-03 RX ORDER — METOPROLOL TARTRATE 25 MG/1
25 TABLET, FILM COATED ORAL 2 TIMES DAILY
Qty: 60 TABLET | Refills: 11 | Status: SHIPPED | OUTPATIENT
Start: 2025-02-03

## 2025-02-03 NOTE — PROGRESS NOTES
"  Subjective:     Encounter Date: 2/03/2025      Patient ID: Magalie Myers is a 59 y.o. male with coronary artery disease s/p CABG x 2 in [place] 07/2021 (LIMA to LAD and SVG to obtuse marginal branch), Ischemic cardiomyopathy with LVEF 45% at time of CABG, hypertension, hyperlipidemia and smoker    Chief Complaint: chest pain  History of Present Illness  Patient presents today for management of coronary artery disease. LOV patient was complaining of chest pain. He underwent a stress test 1/27/2025 that was low risk for ischemia. He also had an echo done that showed LVEF 56-60%, LV diastolic function was normal and no significant valve disease. LOV aspirin and rosuvastatin were restarted. Today he reports that he was in the ER a week prior to stress test. He stated that pain had radiated into his jaws. His HS troponin was <6 x 2, d dimer was normal, and CXR was negative for cardiopulmonary process. He states that he has had some chest pain on and off. He reports that he has continued to have fatigue. He reports that some nights he sleeps ok and some nights he struggles. He reports that his BP has been running pretty controlled running 120-130/60-70's. He denies any palpitations. He reports some heart racing at times. He reports still having dyspnea on exertion. He denies any leg swelling, orthopnea or PND. He reports some intermittent dizziness. He denies any further chest pain since ER visit. Patient follows with Dr Burgos as PCP; he reports that he doesn't go for routine check up.     The following portions of the patient's history were reviewed and updated as appropriate: allergies, current medications, past family history, past medical history, past social history, past surgical history and problem list.    Allergies   Allergen Reactions    Atorvastatin Myalgia     \"Severe joint pain\"       Current Outpatient Medications:     acetaminophen (TYLENOL) 500 MG tablet, Take 2 tablets by mouth., Disp: , Rfl:     " albuterol sulfate  (90 Base) MCG/ACT inhaler, Inhale 2 puffs Every 4 (Four) Hours As Needed for Wheezing or Shortness of Air., Disp: 8 g, Rfl: 0    esomeprazole (nexIUM) 20 MG capsule, Take 2 capsules by mouth Daily., Disp: , Rfl:     fenofibrate 160 MG tablet, Take 1 tablet by mouth Daily., Disp: 90 tablet, Rfl: 3    HM Aspirin EC Low Dose 81 MG EC tablet, TAKE ONE TABLET DAILY, Disp: 90 tablet, Rfl: 2    HYDROcodone-acetaminophen (Norco) 7.5-325 MG per tablet, Take 1 tablet by mouth Every 6 (Six) Hours As Needed for Moderate Pain ., Disp: 68 tablet, Rfl: 0    metoprolol tartrate (LOPRESSOR) 25 MG tablet, Take 1 tablet by mouth 2 (Two) Times a Day., Disp: 60 tablet, Rfl: 11    Naproxen Sodium (ALEVE PO), Take  by mouth As Needed., Disp: , Rfl:     nitroglycerin (NITROSTAT) 0.4 MG SL tablet, 1 under the tongue as needed for angina, may repeat q5mins for up three doses, Disp: 25 tablet, Rfl: 3    rosuvastatin (CRESTOR) 40 MG tablet, Take 1 tablet by mouth Daily., Disp: 90 tablet, Rfl: 3    Symbicort 160-4.5 MCG/ACT inhaler, INHALE 2 PUFFS TWICE DAILY. RINSE MOUTH AFTER USE., Disp: , Rfl:       Past Medical History:   Diagnosis Date    Coronary artery disease     GERD (gastroesophageal reflux disease)     Injury of back     Myocardial infarction     Prostate CA     TB (tuberculosis)     50 years ago     Social History     Socioeconomic History    Marital status:    Tobacco Use    Smoking status: Every Day     Current packs/day: 0.25     Types: Cigarettes     Passive exposure: Current    Smokeless tobacco: Never    Tobacco comments:     quit 7/8/21   Vaping Use    Vaping status: Never Used   Substance and Sexual Activity    Alcohol use: No    Drug use: No    Sexual activity: Defer       Review of Systems   Constitutional: Negative for malaise/fatigue, weight gain and weight loss.   HENT:  Negative for nosebleeds.    Cardiovascular:  Positive for dyspnea on exertion. Negative for chest pain, irregular  "heartbeat, leg swelling, near-syncope, orthopnea, palpitations, paroxysmal nocturnal dyspnea and syncope.   Respiratory:  Positive for shortness of breath.    Hematologic/Lymphatic: Bruises/bleeds easily.   Musculoskeletal:  Negative for muscle weakness.   Genitourinary:  Negative for hematuria.   Neurological:  Negative for dizziness and weakness.   All other systems reviewed and are negative.         Objective:     Vitals reviewed.   Constitutional:       General: Not in acute distress.     Appearance: Normal appearance. Well-developed.   Eyes:      Pupils: Pupils are equal, round, and reactive to light.   HENT:      Head: Normocephalic and atraumatic.      Nose: Nose normal.   Neck:      Vascular: No carotid bruit.   Pulmonary:      Effort: Pulmonary effort is normal. No respiratory distress.      Breath sounds: Normal breath sounds. No wheezing. No rales.   Cardiovascular:      Normal rate. Regular rhythm.      Murmurs: There is no murmur.   Edema:     Peripheral edema absent.   Abdominal:      General: There is no distension.      Palpations: Abdomen is soft.   Musculoskeletal: Normal range of motion.      Cervical back: Normal range of motion and neck supple. Skin:     General: Skin is warm.      Findings: No erythema or rash.   Neurological:      General: No focal deficit present.      Mental Status: Alert and oriented to person, place, and time.   Psychiatric:         Attention and Perception: Attention normal.         Mood and Affect: Mood normal.         Speech: Speech normal.         Behavior: Behavior normal.         Thought Content: Thought content normal.         Judgment: Judgment normal.         /58   Pulse 78   Ht 172.7 cm (68\")   Wt 65.8 kg (145 lb)   SpO2 94%   BMI 22.05 kg/m²     Procedures    Lab Review:       Lab Results   Component Value Date    CHLPL 179 09/06/2022    TRIG 373 (H) 09/06/2022    HDL 24 (L) 09/06/2022    LDL 93 09/06/2022     Results for orders placed during the " hospital encounter of 01/03/25    Adult Transthoracic Echo Complete w/ Color, Spectral and Contrast if necessary per protocol    Interpretation Summary    Left ventricular systolic function is normal. Left ventricular ejection fraction appears to be 56 - 60%.    Left ventricular diastolic function was normal.    Normal right ventricular cavity size and systolic function noted.    There is no significant (greater than mild) valvular dysfunction.    Nuclear stress test 1/27/2025:  Interpretation Summary    Myocardial perfusion imaging indicates a normal myocardial perfusion study with no evidence of ischemia. Impressions are consistent with a low risk study.    Left ventricular ejection fraction is normal (Calculated EF = 59%).    Diaphragmatic attenuation artifact is present.      I have personally reviewed labs, nuclear stress test and past office notes prior to patients visit  Assessment:          Diagnosis Plan   1. Coronary artery disease of bypass graft of native heart with stable angina pectoris        2. Primary hypertension        3. Hyperlipidemia LDL goal <70  Lipid Panel      4. Smoker                     Plan:       1. CAD: s/p CABG x 2 in [place] 07/2021 (LIMA to LAD and SVG to obtuse marginal branch). Nuclear stress test 1/27/2025 was low risk for ischemia. Patient has been experiencing chest pain. He reports that he stopped taking his medications about 6-9 months ago. LOV we restarted aspirin and rosuvastatin. Today we will restart metoprolol. Counseled bharatt on importance of medication compliance. He is to continue to use NTG as needed for chest pain. Discussed if he experiences severe chest pain that is unrelieved by NTG then it is recommended to proceed to ER for further evaluation. If patient continues with chest pain he is to notify office to start imdur or ranexa.     2. Hypertension: controlled today. Restart metoprolol today. Patient has been off medication for the past 6-9 months. Encouraged  patient to keep a BP log and will discuss further at follow up.    3. Hyperlipidemia: Lipid panel on 9/2022 showed improved control of cholesterol. LDL at 93. Triglycerides still elevated. Will reorder Lipid panel today. Restarted patient on rosuvastatin and fenofibrate LOV.    4. Tobacco abuse:  Magalie Myers  reports that he has been smoking cigarettes. He has been exposed to tobacco smoke. He has never used smokeless tobacco.. I have educated him on the risk of diseases from using tobacco products such as cancer, COPD and heart disease. I advised him to quit and he is not willing to quit. I spent 3  minutes counseling the patient.    I attest that all portions of this note reviewed and all information has been updated by myself to reflect the patient's current status.      I spent 38 minutes caring for Magalie on this date of service. This time includes time spent by me in the following activities:preparing for the visit, reviewing tests, obtaining and/or reviewing a separately obtained history, performing a medically appropriate examination and/or evaluation , counseling and educating the patient/family/caregiver, ordering medications, tests, or procedures, and documenting information in the medical record    I spent 2 minutes on the separately reported service of EKG. This time is not included in the time used to support the E/M service also reported today    Patient is to follow up in office in 3 months or sooner if needed

## 2025-06-09 ENCOUNTER — TELEPHONE (OUTPATIENT)
Dept: CARDIOLOGY | Facility: CLINIC | Age: 60
End: 2025-06-09
Payer: COMMERCIAL

## 2025-06-09 NOTE — TELEPHONE ENCOUNTER
"Caller: Lucia, Magalie LYNCH \"Kaden\"    Relationship to patient: Self    Best call back number: [phone]     Chief complaint: CHEST PAIN    Patient directed to call 911 or go to their nearest emergency room.     Patient verbalized understanding: [] Yes  [x] No  If no, why?    Additional notes: PATIENT DOESN'T WANT TO GO. TRANSFERRED TO NURSE LINE.  "

## 2025-06-09 NOTE — TELEPHONE ENCOUNTER
Call has been returned. Patient missed his follow up appointment in May. He tells me he has been under a lot of stress since last visit in 02/2025 with stress from work and at home. He left work this morning due to stressors and active chest pain.He complains of chest pain that is relieved with 1-2 nitro.Unknown what BP is as he does not monitor. He was advised to come to ER for active chest pain. Follow up appointment has been made for patient.

## 2025-06-12 ENCOUNTER — OFFICE VISIT (OUTPATIENT)
Dept: CARDIOLOGY | Facility: CLINIC | Age: 60
End: 2025-06-12
Payer: COMMERCIAL

## 2025-06-12 VITALS
HEART RATE: 75 BPM | DIASTOLIC BLOOD PRESSURE: 83 MMHG | RESPIRATION RATE: 20 BRPM | HEIGHT: 68 IN | BODY MASS INDEX: 22.64 KG/M2 | WEIGHT: 149.4 LBS | SYSTOLIC BLOOD PRESSURE: 152 MMHG

## 2025-06-12 DIAGNOSIS — I10 PRIMARY HYPERTENSION: ICD-10-CM

## 2025-06-12 DIAGNOSIS — E78.5 HYPERLIPIDEMIA LDL GOAL <70: ICD-10-CM

## 2025-06-12 DIAGNOSIS — I25.708 CORONARY ARTERY DISEASE OF BYPASS GRAFT OF NATIVE HEART WITH STABLE ANGINA PECTORIS: Primary | ICD-10-CM

## 2025-06-12 DIAGNOSIS — F17.200 SMOKER: ICD-10-CM

## 2025-06-12 PROCEDURE — 93000 ELECTROCARDIOGRAM COMPLETE: CPT | Performed by: NURSE PRACTITIONER

## 2025-06-12 PROCEDURE — 99214 OFFICE O/P EST MOD 30 MIN: CPT | Performed by: NURSE PRACTITIONER

## 2025-06-12 RX ORDER — ROSUVASTATIN CALCIUM 40 MG/1
40 TABLET, COATED ORAL DAILY
Qty: 90 TABLET | Refills: 3 | Status: SHIPPED | OUTPATIENT
Start: 2025-06-12

## 2025-06-12 RX ORDER — RANOLAZINE 500 MG/1
500 TABLET, EXTENDED RELEASE ORAL 2 TIMES DAILY
Qty: 60 TABLET | Refills: 11 | Status: SHIPPED | OUTPATIENT
Start: 2025-06-12

## 2025-06-12 RX ORDER — LOSARTAN POTASSIUM 25 MG/1
25 TABLET ORAL DAILY
Qty: 30 TABLET | Refills: 11 | Status: SHIPPED | OUTPATIENT
Start: 2025-06-12

## 2025-06-12 NOTE — PROGRESS NOTES
"  Subjective:     Encounter Date: 06/12/2025      Patient ID: Magalie Myers is a 59 y.o. male with coronary artery disease s/p CABG x 2 in [place] 07/2021 (LIMA to LAD and SVG to obtuse marginal branch), Ischemic cardiomyopathy with LVEF 45% at time of CABG, hypertension, hyperlipidemia and smoker    Chief Complaint: chest pain  History of Present Illness  Patient presents today for management of coronary artery disease. Patient was complaining of chest pain. He underwent a stress test 1/27/2025 that was low risk for ischemia. He also had an echo done that showed LVEF 56-60%, LV diastolic function was normal and no significant valve disease. He has been restarted on aspirin, metoprolol and rosuvastatin.   Today he reports that he has had increased amount of chest pain. He reports that his pain is worse with working. He reports that he has a stressful job. He reports that he will be working and the pain will just hit him all of a sudden. He reports that it is located to the left side of his chest. He reports that it is a dull tightness to chest. He reports that he has worsening shortness of breath as well. He reports that he has been having a lot of heart racing. He denies any leg swelling, orthopnea or PND. He has had some pain to his left leg that has been worse as well. He reports that his BP has been elevated 180/90's. Patient follows with Dr Burgos as PCP.      The following portions of the patient's history were reviewed and updated as appropriate: allergies, current medications, past family history, past medical history, past social history, past surgical history and problem list.    Allergies   Allergen Reactions    Atorvastatin Myalgia     \"Severe joint pain\"       Current Outpatient Medications:     acetaminophen (TYLENOL) 500 MG tablet, Take 2 tablets by mouth., Disp: , Rfl:     albuterol sulfate  (90 Base) MCG/ACT inhaler, Inhale 2 puffs Every 4 (Four) Hours As Needed for Wheezing or Shortness of " Air., Disp: 8 g, Rfl: 0    esomeprazole (nexIUM) 20 MG capsule, Take 2 capsules by mouth Daily., Disp: , Rfl:     fenofibrate 160 MG tablet, Take 1 tablet by mouth Daily., Disp: 90 tablet, Rfl: 3    HM Aspirin EC Low Dose 81 MG EC tablet, TAKE ONE TABLET DAILY, Disp: 90 tablet, Rfl: 2    HYDROcodone-acetaminophen (Norco) 7.5-325 MG per tablet, Take 1 tablet by mouth Every 6 (Six) Hours As Needed for Moderate Pain ., Disp: 68 tablet, Rfl: 0    metoprolol tartrate (LOPRESSOR) 25 MG tablet, Take 1 tablet by mouth 2 (Two) Times a Day., Disp: 60 tablet, Rfl: 11    Naproxen Sodium (ALEVE PO), Take  by mouth As Needed., Disp: , Rfl:     nitroglycerin (NITROSTAT) 0.4 MG SL tablet, 1 under the tongue as needed for angina, may repeat q5mins for up three doses, Disp: 25 tablet, Rfl: 3    rosuvastatin (CRESTOR) 40 MG tablet, Take 1 tablet by mouth Daily., Disp: 90 tablet, Rfl: 3    Symbicort 160-4.5 MCG/ACT inhaler, INHALE 2 PUFFS TWICE DAILY. RINSE MOUTH AFTER USE., Disp: , Rfl:     losartan (COZAAR) 25 MG tablet, Take 1 tablet by mouth Daily., Disp: 30 tablet, Rfl: 11    ranolazine (Ranexa) 500 MG 12 hr tablet, Take 1 tablet by mouth 2 (Two) Times a Day., Disp: 60 tablet, Rfl: 11      Past Medical History:   Diagnosis Date    Coronary artery disease     GERD (gastroesophageal reflux disease)     Injury of back     Myocardial infarction     Prostate CA     TB (tuberculosis)     50 years ago     Social History     Socioeconomic History    Marital status:    Tobacco Use    Smoking status: Every Day     Current packs/day: 0.25     Types: Cigarettes     Passive exposure: Current    Smokeless tobacco: Never    Tobacco comments:     quit 7/8/21   Vaping Use    Vaping status: Never Used   Substance and Sexual Activity    Alcohol use: No    Drug use: No    Sexual activity: Defer       Review of Systems   Constitutional: Negative for malaise/fatigue, weight gain and weight loss.   HENT:  Negative for nosebleeds.   "  Cardiovascular:  Positive for chest pain and dyspnea on exertion. Negative for irregular heartbeat, leg swelling, near-syncope, orthopnea, palpitations, paroxysmal nocturnal dyspnea and syncope.   Respiratory:  Positive for shortness of breath.    Hematologic/Lymphatic: Bruises/bleeds easily.   Musculoskeletal:  Negative for muscle weakness.   Genitourinary:  Negative for hematuria.   Neurological:  Negative for dizziness and weakness.   All other systems reviewed and are negative.         Objective:     Vitals reviewed.   Constitutional:       General: Not in acute distress.     Appearance: Normal appearance. Well-developed.   Eyes:      Pupils: Pupils are equal, round, and reactive to light.   HENT:      Head: Normocephalic and atraumatic.      Nose: Nose normal.   Neck:      Vascular: No carotid bruit.   Pulmonary:      Effort: Pulmonary effort is normal. No respiratory distress.      Breath sounds: Normal breath sounds. No wheezing. No rales.   Cardiovascular:      Normal rate. Regular rhythm.      Murmurs: There is no murmur.   Edema:     Peripheral edema absent.   Abdominal:      General: There is no distension.      Palpations: Abdomen is soft.   Musculoskeletal: Normal range of motion.      Cervical back: Normal range of motion and neck supple. Skin:     General: Skin is warm.      Findings: No erythema or rash.   Neurological:      General: No focal deficit present.      Mental Status: Alert and oriented to person, place, and time.   Psychiatric:         Attention and Perception: Attention normal.         Mood and Affect: Mood is anxious.         Speech: Speech normal.         Behavior: Behavior normal.         Thought Content: Thought content normal.         Judgment: Judgment normal.         /83 (BP Location: Left arm, Patient Position: Sitting, Cuff Size: Adult)   Pulse 75   Resp 20   Ht 172.7 cm (67.99\")   Wt 67.8 kg (149 lb 6.4 oz)   BMI 22.72 kg/m²       ECG 12 Lead    Date/Time: " 6/12/2025 10:28 AM  Performed by: Stephane Head APRN    Authorized by: Stephane Head APRN  Comparison: compared with previous ECG from 1/8/2025  Similar to previous ECG  Rhythm: sinus rhythm  Rate: normal  BPM: 72          Lab Review:       Lab Results   Component Value Date    CHLPL 179 09/06/2022    TRIG 373 (H) 09/06/2022    HDL 24 (L) 09/06/2022    LDL 93 09/06/2022     Results for orders placed during the hospital encounter of 01/03/25    Adult Transthoracic Echo Complete w/ Color, Spectral and Contrast if necessary per protocol    Interpretation Summary    Left ventricular systolic function is normal. Left ventricular ejection fraction appears to be 56 - 60%.    Left ventricular diastolic function was normal.    Normal right ventricular cavity size and systolic function noted.    There is no significant (greater than mild) valvular dysfunction.    Nuclear stress test 1/27/2025:  Interpretation Summary    Myocardial perfusion imaging indicates a normal myocardial perfusion study with no evidence of ischemia. Impressions are consistent with a low risk study.    Left ventricular ejection fraction is normal (Calculated EF = 59%).    Diaphragmatic attenuation artifact is present.    I have personally reviewed labs, nuclear stress test and past office notes prior to patients visit  Assessment:          Diagnosis Plan   1. Coronary artery disease of bypass graft of native heart with stable angina pectoris        2. Primary hypertension        3. Hyperlipidemia LDL goal <70  rosuvastatin (CRESTOR) 40 MG tablet      4. Smoker                       Plan:       1. CAD: s/p CABG x 2 in [place] 07/2021 (LIMA to LAD and SVG to obtuse marginal branch). Nuclear stress test 1/27/2025 was low risk for ischemia. Patient has been experiencing chest pain. He had previously stopped his medications; I have restarted him on aspirin, rosuvastatin and metoprolol. Counseled caridad on importance of medication compliance. He is to  continue to use NTG as needed for chest pain. With increase in chest pain will start ranexa for pain. Discussed if he experiences severe chest pain that is unrelieved by NTG then it is recommended to proceed to ER for further evaluation. If patient continues with chest pain he is to notify office to start imdur or increase ranexa.     2. Hypertension: elevated in office today will start losartan today. Recommend to follow up with PCP. Encouraged patient to keep a BP log and will discuss further at follow up.    3. Hyperlipidemia: Lipid panel on 9/2022 showed improved control of cholesterol. LDL at 93. Triglycerides still elevated. Restarted patient on rosuvastatin and fenofibrate. Have attempted to order a lipid panel but it hasn't been done.     4. Tobacco abuse:  Magalie Myers  reports that he has been smoking cigarettes. He has been exposed to tobacco smoke. He has never used smokeless tobacco.. I have educated him on the risk of diseases from using tobacco products such as cancer, COPD and heart disease. I advised him to quit and he is not willing to quit. I spent 3  minutes counseling the patient.    I attest that all portions of this note reviewed and all information has been updated by myself to reflect the patient's current status.      I spent 35 minutes caring for Mgaalie on this date of service. This time includes time spent by me in the following activities:preparing for the visit, reviewing tests, obtaining and/or reviewing a separately obtained history, performing a medically appropriate examination and/or evaluation , counseling and educating the patient/family/caregiver, ordering medications, tests, or procedures, and documenting information in the medical record    I spent 2 minutes on the separately reported service of EKG. This time is not included in the time used to support the E/M service also reported today.    Patient is to follow up in office in 6-8 weeks or sooner if needed

## 2025-06-18 ENCOUNTER — HOSPITAL ENCOUNTER (EMERGENCY)
Facility: HOSPITAL | Age: 60
Discharge: HOME | End: 2025-06-18
Attending: EMERGENCY MEDICINE
Payer: COMMERCIAL

## 2025-06-18 ENCOUNTER — TELEPHONE (OUTPATIENT)
Dept: CARDIOLOGY | Facility: CLINIC | Age: 60
End: 2025-06-18
Payer: COMMERCIAL

## 2025-06-18 ENCOUNTER — APPOINTMENT (OUTPATIENT)
Dept: GENERAL RADIOLOGY | Facility: HOSPITAL | Age: 60
End: 2025-06-18
Payer: COMMERCIAL

## 2025-06-18 VITALS
HEART RATE: 69 BPM | WEIGHT: 149.5 LBS | OXYGEN SATURATION: 99 % | TEMPERATURE: 98.3 F | DIASTOLIC BLOOD PRESSURE: 74 MMHG | SYSTOLIC BLOOD PRESSURE: 126 MMHG | HEIGHT: 68 IN | RESPIRATION RATE: 16 BRPM | BODY MASS INDEX: 22.66 KG/M2

## 2025-06-18 DIAGNOSIS — R07.9 CHEST PAIN, UNSPECIFIED TYPE: Primary | ICD-10-CM

## 2025-06-18 LAB
ALBUMIN SERPL-MCNC: 4.3 G/DL (ref 3.5–5.2)
ALBUMIN/GLOB SERPL: 1.4 G/DL
ALP SERPL-CCNC: 92 U/L (ref 39–117)
ALT SERPL W P-5'-P-CCNC: 12 U/L (ref 1–41)
ANION GAP SERPL CALCULATED.3IONS-SCNC: 11 MMOL/L (ref 5–15)
AST SERPL-CCNC: 19 U/L (ref 1–40)
BASOPHILS # BLD MANUAL: 0 10*3/MM3 (ref 0–0.2)
BASOPHILS NFR BLD MANUAL: 0 % (ref 0–1.5)
BILIRUB SERPL-MCNC: 0.6 MG/DL (ref 0–1.2)
BUN SERPL-MCNC: 8.7 MG/DL (ref 6–20)
BUN/CREAT SERPL: 12.4 (ref 7–25)
CALCIUM SPEC-SCNC: 9.1 MG/DL (ref 8.6–10.5)
CHLORIDE SERPL-SCNC: 100 MMOL/L (ref 98–107)
CO2 SERPL-SCNC: 23 MMOL/L (ref 22–29)
CREAT SERPL-MCNC: 0.7 MG/DL (ref 0.76–1.27)
DEPRECATED RDW RBC AUTO: 42.9 FL (ref 37–54)
EGFRCR SERPLBLD CKD-EPI 2021: 106.1 ML/MIN/1.73
EOSINOPHIL # BLD MANUAL: 0.56 10*3/MM3 (ref 0–0.4)
EOSINOPHIL NFR BLD MANUAL: 5.6 % (ref 0.3–6.2)
ERYTHROCYTE [DISTWIDTH] IN BLOOD BY AUTOMATED COUNT: 13.2 % (ref 12.3–15.4)
GEN 5 1HR TROPONIN T REFLEX: <6 NG/L
GLOBULIN UR ELPH-MCNC: 3 GM/DL
GLUCOSE SERPL-MCNC: 143 MG/DL (ref 65–99)
HCT VFR BLD AUTO: 43.9 % (ref 37.5–51)
HGB BLD-MCNC: 15.3 G/DL (ref 13–17.7)
HOLD SPECIMEN: NORMAL
INR PPP: 0.86 (ref 0.91–1.09)
LYMPHOCYTES # BLD MANUAL: 2.98 10*3/MM3 (ref 0.7–3.1)
LYMPHOCYTES NFR BLD MANUAL: 5.6 % (ref 5–12)
MCH RBC QN AUTO: 30.8 PG (ref 26.6–33)
MCHC RBC AUTO-ENTMCNC: 34.9 G/DL (ref 31.5–35.7)
MCV RBC AUTO: 88.3 FL (ref 79–97)
MONOCYTES # BLD: 0.56 10*3/MM3 (ref 0.1–0.9)
NEUTROPHILS # BLD AUTO: 5.86 10*3/MM3 (ref 1.7–7)
NEUTROPHILS NFR BLD MANUAL: 58.1 % (ref 42.7–76)
NEUTS BAND NFR BLD MANUAL: 0.8 % (ref 0–5)
PLAT MORPH BLD: NORMAL
PLATELET # BLD AUTO: 218 10*3/MM3 (ref 140–450)
PMV BLD AUTO: 10 FL (ref 6–12)
POTASSIUM SERPL-SCNC: 3.6 MMOL/L (ref 3.5–5.2)
PROT SERPL-MCNC: 7.3 G/DL (ref 6–8.5)
PROTHROMBIN TIME: 12.1 SECONDS (ref 11.8–14.8)
QT INTERVAL: 384 MS
QTC INTERVAL: 440 MS
RBC # BLD AUTO: 4.97 10*6/MM3 (ref 4.14–5.8)
RBC MORPH BLD: NORMAL
SODIUM SERPL-SCNC: 134 MMOL/L (ref 136–145)
TROPONIN T NUMERIC DELTA: NORMAL
TROPONIN T SERPL HS-MCNC: <6 NG/L
VARIANT LYMPHS NFR BLD MANUAL: 22.6 % (ref 19.6–45.3)
VARIANT LYMPHS NFR BLD MANUAL: 7.3 % (ref 0–5)
WBC MORPH BLD: NORMAL
WBC NRBC COR # BLD AUTO: 9.95 10*3/MM3 (ref 3.4–10.8)
WHOLE BLOOD HOLD COAG: NORMAL
WHOLE BLOOD HOLD SPECIMEN: NORMAL

## 2025-06-18 PROCEDURE — 80053 COMPREHEN METABOLIC PANEL: CPT | Performed by: EMERGENCY MEDICINE

## 2025-06-18 PROCEDURE — 93005 ELECTROCARDIOGRAM TRACING: CPT

## 2025-06-18 PROCEDURE — 85007 BL SMEAR W/DIFF WBC COUNT: CPT | Performed by: EMERGENCY MEDICINE

## 2025-06-18 PROCEDURE — 71045 X-RAY EXAM CHEST 1 VIEW: CPT

## 2025-06-18 PROCEDURE — 85610 PROTHROMBIN TIME: CPT | Performed by: EMERGENCY MEDICINE

## 2025-06-18 PROCEDURE — 93005 ELECTROCARDIOGRAM TRACING: CPT | Performed by: EMERGENCY MEDICINE

## 2025-06-18 PROCEDURE — 99284 EMERGENCY DEPT VISIT MOD MDM: CPT | Performed by: EMERGENCY MEDICINE

## 2025-06-18 PROCEDURE — 85025 COMPLETE CBC W/AUTO DIFF WBC: CPT | Performed by: EMERGENCY MEDICINE

## 2025-06-18 PROCEDURE — 93010 ELECTROCARDIOGRAM REPORT: CPT | Performed by: INTERNAL MEDICINE

## 2025-06-18 PROCEDURE — 36415 COLL VENOUS BLD VENIPUNCTURE: CPT

## 2025-06-18 PROCEDURE — 84484 ASSAY OF TROPONIN QUANT: CPT | Performed by: EMERGENCY MEDICINE

## 2025-06-18 RX ORDER — SODIUM CHLORIDE 0.9 % (FLUSH) 0.9 %
10 SYRINGE (ML) INJECTION AS NEEDED
Status: DISCONTINUED | OUTPATIENT
Start: 2025-06-18 | End: 2025-06-18 | Stop reason: HOSPADM

## 2025-06-18 RX ORDER — ISOSORBIDE MONONITRATE 30 MG/1
30 TABLET, EXTENDED RELEASE ORAL DAILY
Qty: 30 TABLET | Refills: 0 | Status: SHIPPED | OUTPATIENT
Start: 2025-06-18

## 2025-06-18 RX ORDER — ASPIRIN 81 MG/1
324 TABLET, CHEWABLE ORAL ONCE
Status: COMPLETED | OUTPATIENT
Start: 2025-06-18 | End: 2025-06-18

## 2025-06-18 RX ADMIN — ASPIRIN 324 MG: 81 TABLET, CHEWABLE ORAL at 06:57

## 2025-06-18 NOTE — ED PROVIDER NOTES
"Subjective   History of Present Illness  69-year-old male presents to the ED with complaint of chest pain.  History of coronary disease status post CABG x 2 in [place] 07/2021 (LIMA to LAD and SVG to obtuse marginal branch), Ischemic cardiomyopathy with LVEF 45% at time of CABG, hypertension, hyperlipidemia and smoker.  Patient reports onset of chest pain this morning around 4:30 AM while taking a shower.  Describes pain as upper substernal pressure, 7/10 in severity.  Pain radiate to his left shoulder.  He took nitroglycerin and pain improved to 3/10 but did not resolve so he came to the ED for evaluation.  Upon arrival to the emergency department, states his chest pain was \"calming down\".  When I saw the patient, states he was nearly chest pain-free.  He denies associated nausea or diaphoresis.  Does report associated shortness of breath and a feeling of fatigue.  In January 2025, underwent myocardial perfusion study which did not reveal any evidence of ischemia.  Reports intermittent chest pain for the past 2 weeks, saw her cardiology NP 10 days ago, has follow up with his cardiologist next month.    History provided by:  Patient      Review of Systems   All other systems reviewed and are negative.      Past Medical History:   Diagnosis Date    Coronary artery disease     GERD (gastroesophageal reflux disease)     Injury of back     Myocardial infarction     Prostate CA     TB (tuberculosis)     50 years ago       Allergies   Allergen Reactions    Atorvastatin Myalgia     \"Severe joint pain\"       Past Surgical History:   Procedure Laterality Date    BACK SURGERY      Patient has had 2 previous back surgeries.     CORONARY ARTERY BYPASS GRAFT      x2    ENDOSCOPY N/A 03/08/2021    Procedure: ESOPHAGOGASTRODUODENOSCOPY WITH ANESTHESIA;  Surgeon: Indra Shabazz MD;  Location: [facility];  Service: Gastroenterology;  Laterality: N/A;  pre food bolus  post food bolus  dr cruz ascencio    PROSTATE SURGERY   "       Family History   Problem Relation Age of Onset    Arthritis Mother     Heart disease Father     Cancer Father     Heart attack Father     Heart disease Maternal Grandfather     Heart attack Maternal Grandfather     Heart disease Paternal Grandfather     Heart attack Paternal Grandfather        Social History     Socioeconomic History    Marital status:    Tobacco Use    Smoking status: Every Day     Current packs/day: 0.25     Types: Cigarettes     Passive exposure: Current    Smokeless tobacco: Never    Tobacco comments:     quit 7/8/21   Vaping Use    Vaping status: Never Used   Substance and Sexual Activity    Alcohol use: No    Drug use: No    Sexual activity: Defer           Objective   Physical Exam  Vitals and nursing note reviewed.   Constitutional:       Appearance: He is well-developed and normal weight.   HENT:      Head: Normocephalic and atraumatic.      Nose: Nose normal. No congestion or rhinorrhea.      Mouth/Throat:      Mouth: Mucous membranes are moist.   Eyes:      Extraocular Movements: Extraocular movements intact.      Conjunctiva/sclera: Conjunctivae normal.      Pupils: Pupils are equal, round, and reactive to light.   Cardiovascular:      Rate and Rhythm: Normal rate and regular rhythm.      Heart sounds: Normal heart sounds. No murmur heard.  Pulmonary:      Effort: Pulmonary effort is normal.      Breath sounds: Normal breath sounds. No wheezing, rhonchi or rales.   Abdominal:      General: Abdomen is flat. Bowel sounds are normal.      Palpations: Abdomen is soft.   Musculoskeletal:      Right lower leg: No edema.      Left lower leg: No edema.   Skin:     General: Skin is warm and dry.   Neurological:      General: No focal deficit present.      Mental Status: He is alert and oriented to person, place, and time. Mental status is at baseline.         ECG 12 Lead Chest Pain      Date/Time: 6/18/2025 6:10 AM    Performed by: Neri Manley MD  Authorized by: Vineet  Neri Gray MD  Interpreted by ED physician  Comments: Normal sinus rhythm, rate 79, possible LAE, normal axis normal intervals, no acute ischemic changes             Lab Results (last 24 hours)       Procedure Component Value Units Date/Time    CBC & Differential [[id number]]  (Normal) Collected: 06/18/25 0616    Specimen: Blood from Arm, Right Updated: 06/18/25 0722    Narrative:      The following orders were created for panel order CBC & Differential.  Procedure                               Abnormality         Status                     ---------                               -----------         ------                     CBC Auto Differential[[id number]]        Normal              Final result                 Please view results for these tests on the individual orders.    Comprehensive Metabolic Panel [[id number]]  (Abnormal) Collected: 06/18/25 0616    Specimen: Blood from Arm, Right Updated: 06/18/25 0701     Glucose 143 mg/dL      BUN 8.7 mg/dL      Creatinine 0.70 mg/dL      Sodium 134 mmol/L      Potassium 3.6 mmol/L      Chloride 100 mmol/L      CO2 23.0 mmol/L      Calcium 9.1 mg/dL      Total Protein 7.3 g/dL      Albumin 4.3 g/dL      ALT (SGPT) 12 U/L      AST (SGOT) 19 U/L      Alkaline Phosphatase 92 U/L      Total Bilirubin 0.6 mg/dL      Globulin 3.0 gm/dL      A/G Ratio 1.4 g/dL      BUN/Creatinine Ratio 12.4     Anion Gap 11.0 mmol/L      eGFR 106.1 mL/min/1.73     Narrative:      GFR Categories in Chronic Kidney Disease (CKD)              GFR Category          GFR (mL/min/1.73)    Interpretation  G1                    90 or greater        Normal or high (1)  G2                    60-89                Mild decrease (1)  G3a                   45-59                Mild to moderate decrease  G3b                   30-44                Moderate to severe decrease  G4                    15-29                Severe decrease  G5                    14 or less           Kidney failure    (1)In the  absence of evidence of kidney disease, neither GFR category G1 or G2 fulfill the criteria for CKD.    eGFR calculation 2021 CKD-EPI creatinine equation, which does not include race as a factor    High Sensitivity Troponin T [[id number]]  (Normal) Collected: 06/18/25 0616    Specimen: Blood from Arm, Right Updated: 06/18/25 0657     HS Troponin T <6 ng/L     Narrative:      High Sensitive Troponin T Reference Range:  <14.0 ng/L- Negative Female for AMI  <22.0 ng/L- Negative Male for AMI  >=14 - Abnormal Female indicating possible myocardial injury.  >=22 - Abnormal Male indicating possible myocardial injury.   Clinicians would have to utilize clinical acumen, EKG, Troponin, and serial changes to determine if it is an Acute Myocardial Infarction or myocardial injury due to an underlying chronic condition.         Protime-INR [[id number]]  (Abnormal) Collected: 06/18/25 0616    Specimen: Blood from Arm, Right Updated: 06/18/25 0654     Protime 12.1 Seconds      INR 0.86    CBC Auto Differential [[id number]]  (Normal) Collected: 06/18/25 0616    Specimen: Blood from Arm, Right Updated: 06/18/25 0722     WBC 9.95 10*3/mm3      RBC 4.97 10*6/mm3      Hemoglobin 15.3 g/dL      Hematocrit 43.9 %      MCV 88.3 fL      MCH 30.8 pg      MCHC 34.9 g/dL      RDW 13.2 %      RDW-SD 42.9 fl      MPV 10.0 fL      Platelets 218 10*3/mm3     Narrative:      The previously reported component NRBC is no longer being reported. Previous result was 0.0 /100 WBC (Reference Range: 0.0-0.2 /100 WBC) on 6/18/2025 at 0653 CDT.    Manual Differential [[id number]]  (Abnormal) Collected: 06/18/25 0616    Specimen: Blood from Arm, Right Updated: 06/18/25 0722     Neutrophil % 58.1 %      Lymphocyte % 22.6 %      Monocyte % 5.6 %      Eosinophil % 5.6 %      Basophil % 0.0 %      Bands %  0.8 %      Atypical Lymphocyte % 7.3 %      Neutrophils Absolute 5.86 10*3/mm3      Lymphocytes Absolute 2.98 10*3/mm3      Monocytes Absolute 0.56 10*3/mm3       Eosinophils Absolute 0.56 10*3/mm3      Basophils Absolute 0.00 10*3/mm3      RBC Morphology Normal     WBC Morphology Normal     Platelet Morphology Normal    High Sensitivity Troponin T 1Hr [[id number]] Collected: 06/18/25 0725    Specimen: Blood Updated: 06/18/25 0749     HS Troponin T <6 ng/L      Troponin T Numeric Delta --     Comment: Unable to calculate.       Narrative:      High Sensitive Troponin T Reference Range:  <14.0 ng/L- Negative Female for AMI  <22.0 ng/L- Negative Male for AMI  >=14 - Abnormal Female indicating possible myocardial injury.  >=22 - Abnormal Male indicating possible myocardial injury.   Clinicians would have to utilize clinical acumen, EKG, Troponin, and serial changes to determine if it is an Acute Myocardial Infarction or myocardial injury due to an underlying chronic condition.              XR Chest 1 View  Result Date: 6/18/2025  XR CHEST 1 VW-  HISTORY: Chest Pain Protocol  COMPARISON: 1/8/2025  FINDINGS: Frontal view the chest obtained.  Evidence of prior mediastinal surgery. Lungs remain mildly hyperinflated. Mild chronic peribronchial thickening. No acute consolidation or edema. Right upper lobe granuloma. No pleural effusion or pneumothorax. No acute regional bony pathology.      1. Chronic changes as described above with evidence of prior mediastinal surgery. No acute process identified.   This report was signed and finalized on 6/18/2025 7:01 AM by Dr. Ania Tucker MD.       ED Course  ED Course as of 06/18/25 1051   Wed Jun 18, 2025   1047 69-year-old male with history of coronary disease status post CABG x 2 7/2021, ischemic myopathy with LVEF 45%, hypertension, hyperlipidemia, ongoing tobacco abuse who presents to the ED with complaint of chest pain.  Has had a normal myocardial perfusion study within the past 6 months.  EKG nonischemic.  Initial and repeat troponin less than 6.  Normal and on a flat trajectory.  Do not suspect acute ischemia.  Suspect more likely  stable angina versus crescendo angina.    Saw his cardiologist last week, patient has stopped his medications, they resumed his meds and added Ranexa.  Given his history and recurrent chest pain over the past couple weeks we discussed the case with Dr. Richards with cardiology, since the patient had some improvement with nitroglycerin at home, recommended adding Imdur daily, Dr. Richards will send a secure chat to patient's cardiology service so they can arrange quick outpatient follow-up and patient was instructed to return to the ED if he develop recurrent episodes of chest pain.     [AW]      ED Course User Index  [AW] Neri Manley MD                  HEART Score: 4   Shared Decision Making  I discussed the findings with the patient/patient representative who is in agreement with the treatment plan and the final disposition.  Risks and benefits of discharge and/or observation/admission were discussed: Yes                                      Medical Decision Making  Amount and/or Complexity of Data Reviewed  Labs: ordered.  Radiology: ordered.  ECG/medicine tests: ordered and independent interpretation performed.    Risk  OTC drugs.  Prescription drug management.        Final diagnoses:   Chest pain, unspecified type       ED Disposition  ED Disposition       ED Disposition   Discharge    Condition   Stable    Comment   --               Edinson Hardy MD  [address] [facility] [address]  [facility] [address]  [phone]    Schedule an appointment as soon as possible for a visit            Medication List        New Prescriptions      isosorbide mononitrate 30 MG 24 hr tablet  Commonly known as: IMDUR  Take 1 tablet by mouth Daily.               Where to Get Your Medications        These medications were sent to [facility] - [place], [place] - [address] [place] [address] - [phone]  - [phone]   [address] [place] [address]      Phone: [phone]   isosorbide mononitrate 30 MG 24 hr tablet             Neri Manley MD  06/18/25 8993

## 2025-06-18 NOTE — TELEPHONE ENCOUNTER
Patient was in the ER today  He is back with chest pain. ER workup is negative. Imdur was added. He was scheduled an office follow up in 1 week with . I called to let him know about the appointment and he asked if he could be off a couple of days because he still isnt feeling good after talking to  he said it was okay for him to be off until his follow up on 06/27/2025. A letter is in media  isnt here to sign so under his name I have signed mine and it will be scanned to the chart the patient said his mom Kat Guess will be by to get the letter on 06/19/2025

## 2025-06-19 LAB
QT INTERVAL: 432 MS
QTC INTERVAL: 456 MS

## 2025-06-25 PROBLEM — R06.02 SOB (SHORTNESS OF BREATH): Status: ACTIVE | Noted: 2025-06-25

## 2025-06-27 ENCOUNTER — OFFICE VISIT (OUTPATIENT)
Dept: CARDIOLOGY | Facility: CLINIC | Age: 60
End: 2025-06-27
Payer: COMMERCIAL

## 2025-06-27 VITALS
WEIGHT: 147 LBS | DIASTOLIC BLOOD PRESSURE: 83 MMHG | HEART RATE: 75 BPM | BODY MASS INDEX: 22.28 KG/M2 | HEIGHT: 68 IN | SYSTOLIC BLOOD PRESSURE: 160 MMHG

## 2025-06-27 DIAGNOSIS — I51.9 LV DYSFUNCTION: ICD-10-CM

## 2025-06-27 DIAGNOSIS — R07.9 CHEST PAIN IN ADULT: ICD-10-CM

## 2025-06-27 DIAGNOSIS — Z95.1 S/P CABG (CORONARY ARTERY BYPASS GRAFT): ICD-10-CM

## 2025-06-27 DIAGNOSIS — I25.708 CORONARY ARTERY DISEASE OF BYPASS GRAFT OF NATIVE HEART WITH STABLE ANGINA PECTORIS: Primary | ICD-10-CM

## 2025-06-27 LAB
QT INTERVAL: 384 MS
QTC INTERVAL: 440 MS

## 2025-06-27 PROCEDURE — 99214 OFFICE O/P EST MOD 30 MIN: CPT | Performed by: INTERNAL MEDICINE

## 2025-06-27 RX ORDER — SODIUM CHLORIDE 9 MG/ML
40 INJECTION, SOLUTION INTRAVENOUS AS NEEDED
OUTPATIENT
Start: 2025-06-27

## 2025-06-27 RX ORDER — SODIUM CHLORIDE 0.9 % (FLUSH) 0.9 %
10 SYRINGE (ML) INJECTION EVERY 12 HOURS SCHEDULED
OUTPATIENT
Start: 2025-06-27

## 2025-06-27 RX ORDER — SODIUM CHLORIDE 0.9 % (FLUSH) 0.9 %
10 SYRINGE (ML) INJECTION AS NEEDED
OUTPATIENT
Start: 2025-06-27

## 2025-06-27 NOTE — PROGRESS NOTES
Magalie Myers  [id number]  1965  59 y.o.  male    Referring Provider: Shai Tran APRN    Reason for  Visit: Patient called to be seen due to new symptoms      Subjective     Chest pain both with exertion as well as at rest when especially if he is in hot environment or with mental stress   S/L NTG helps subside the chest pain within 5 mins of taking   Feels episodes of chest pain occur more often with exertion  Moderate substernal,   Pressure like   Chest pain non pleuritic  Chest pain non positional and non gustatory   Lasts less than 5 minutes   Occurs once or twice a week on the average but can be variable in frequency  No associated diaphoresis  No associated nausea  No radiation  Relieved with rest or spontaneously  Not positional  No change with intake of food or antacids  No change with breathing  Mild to moderate associated dyspnea    No similar chest pain episodes in the past   BP well controlled at home.    BP in clinic as below    No bleeding, excessive bruising, gait instability or fall risks      History of present illness:  Magalie Myers is a 59 y.o. yo male with coronary artery disease coronary artery bypass grafting  who presents today for   Chief Complaint   Patient presents with    Follow-up     1 week ER follow up - chest pain and Shortness of breath. Patient is currently off work    .    History  Past Medical History:   Diagnosis Date    Coronary artery disease     GERD (gastroesophageal reflux disease)     Injury of back     Myocardial infarction     Prostate CA     TB (tuberculosis)     50 years ago   ,   Past Surgical History:   Procedure Laterality Date    BACK SURGERY      Patient has had 2 previous back surgeries.     CORONARY ARTERY BYPASS GRAFT      x2    ENDOSCOPY N/A 03/08/2021    Procedure: ESOPHAGOGASTRODUODENOSCOPY WITH ANESTHESIA;  Surgeon: Indra Shabazz MD;  Location: [facility];  Service: Gastroenterology;  Laterality: N/A;  pre food bolus  post food bolus    cruz ascencio    PROSTATE SURGERY     ,   Family History   Problem Relation Age of Onset    Arthritis Mother     Heart disease Father     Cancer Father     Heart attack Father     Heart disease Maternal Grandfather     Heart attack Maternal Grandfather     Heart disease Paternal Grandfather     Heart attack Paternal Grandfather    ,   Social History     Tobacco Use    Smoking status: Every Day     Current packs/day: 0.25     Types: Cigarettes     Passive exposure: Current    Smokeless tobacco: Never    Tobacco comments:     quit 7/8/21   Vaping Use    Vaping status: Never Used   Substance Use Topics    Alcohol use: No    Drug use: No   ,     Medications  Current Outpatient Medications   Medication Sig Dispense Refill    acetaminophen (TYLENOL) 500 MG tablet Take 2 tablets by mouth.      albuterol sulfate  (90 Base) MCG/ACT inhaler Inhale 2 puffs Every 4 (Four) Hours As Needed for Wheezing or Shortness of Air. 8 g 0    esomeprazole (nexIUM) 20 MG capsule Take 2 capsules by mouth Daily.      fenofibrate 160 MG tablet Take 1 tablet by mouth Daily. 90 tablet 3    HM Aspirin EC Low Dose 81 MG EC tablet TAKE ONE TABLET DAILY 90 tablet 2    HYDROcodone-acetaminophen (Norco) 7.5-325 MG per tablet Take 1 tablet by mouth Every 6 (Six) Hours As Needed for Moderate Pain . 68 tablet 0    isosorbide mononitrate (IMDUR) 30 MG 24 hr tablet Take 1 tablet by mouth Daily. 30 tablet 0    losartan (COZAAR) 25 MG tablet Take 1 tablet by mouth Daily. 30 tablet 11    metoprolol tartrate (LOPRESSOR) 25 MG tablet Take 1 tablet by mouth 2 (Two) Times a Day. 60 tablet 11    Naproxen Sodium (ALEVE PO) Take  by mouth As Needed.      nitroglycerin (NITROSTAT) 0.4 MG SL tablet 1 under the tongue as needed for angina, may repeat q5mins for up three doses 25 tablet 3    ranolazine (Ranexa) 500 MG 12 hr tablet Take 1 tablet by mouth 2 (Two) Times a Day. 60 tablet 11    rosuvastatin (CRESTOR) 40 MG tablet Take 1 tablet by mouth Daily. 90 tablet  "3    Symbicort 160-4.5 MCG/ACT inhaler INHALE 2 PUFFS TWICE DAILY. RINSE MOUTH AFTER USE.       No current facility-administered medications for this visit.       Allergies:  Atorvastatin    Review of Systems  Review of Systems   Constitutional: Negative.   HENT: Negative.     Eyes: Negative.    Cardiovascular:  Positive for chest pain. Negative for claudication, cyanosis, dyspnea on exertion, irregular heartbeat, leg swelling, near-syncope, orthopnea, palpitations, paroxysmal nocturnal dyspnea and syncope.   Respiratory: Negative.     Endocrine: Negative.    Hematologic/Lymphatic: Negative.    Skin: Negative.    Gastrointestinal:  Negative for anorexia.   Genitourinary: Negative.    Neurological: Negative.    Psychiatric/Behavioral: Negative.         Objective     Physical Exam:  /83   Pulse 75   Ht 172.7 cm (68\")   Wt 66.7 kg (147 lb)   BMI 22.35 kg/m²     Physical Exam  Constitutional:       Appearance: He is well-developed.   HENT:      Head: Normocephalic.   Neck:      Vascular: Normal carotid pulses. No carotid bruit or JVD.      Trachea: No tracheal tenderness or tracheal deviation.   Cardiovascular:      Rate and Rhythm: Regular rhythm.      Pulses: Normal pulses.      Heart sounds: Normal heart sounds.   Pulmonary:      Effort: Pulmonary effort is normal.      Breath sounds: No stridor.   Abdominal:      General: There is no distension.      Palpations: Abdomen is soft.      Tenderness: There is no abdominal tenderness.   Musculoskeletal:      Cervical back: No edema.   Skin:     General: Skin is warm.   Neurological:      Mental Status: He is alert.      Cranial Nerves: No cranial nerve deficit.      Sensory: No sensory deficit.   Psychiatric:         Speech: Speech normal.         Behavior: Behavior normal.         Results Review:    Results for orders placed during the hospital encounter of 01/03/25    Adult Transthoracic Echo Complete w/ Color, Spectral and Contrast if necessary per " "protocol    Interpretation Summary    Left ventricular systolic function is normal. Left ventricular ejection fraction appears to be 56 - 60%.    Left ventricular diastolic function was normal.    Normal right ventricular cavity size and systolic function noted.    There is no significant (greater than mild) valvular dysfunction.   Regadenoson Stress Test with Myocardial Perfusion SPECT (Multi Study)    Patient Name: Magalie Myers \"Kaden\"   Patient MRN: [id number]   Patient : 1965 (59 y.o.)   Legal Sex: Male    Accession Number: [id number]   Date of Study: 25   Ordering Provider: Stephane Head APRN    Clinical Indications: Chest Pain       Reading Physicians  Performing Staff   ECG [place], SPECT [place]: Edinson Hardy MD    Tech: Olga Lidia Barrera    Support Staff: Sherry oFx RN Brindley, Kyle, [facility] PACS Images     Show images for Stress Test With Myocardial Perfusion (1 Day)   Interpretation Summary         Myocardial perfusion imaging indicates a normal myocardial perfusion study with no evidence of ischemia. Impressions are consistent with a low risk study.    Left ventricular ejection fraction is normal (Calculated EF = 59%).    Diaphragmatic attenuation artifact is present.        ____________________________________________________________________________________________________________________________________________  Health maintenance and recommendations    Low salt/ HTN/ Heart healthy carbohydrate restricted cardiac diet   The patient is advised to reduce or avoid caffeine or other cardiac stimulants.   Minimize or avoid  NSAID-type medications      Monitor for any signs of bleeding including red or dark stools. Fall precautions.   Advised staying uptodate with immunizations per established standard guidelines.    Offered to give patient  a copy of my notes     Questions were encouraged, asked and answered to the patient's  understanding and satisfaction. Questions if any " regarding current medications and side effects, need for refills and importance of compliance to medications stressed.    Reviewed available prior notes, consults, prior visits, laboratory findings, radiology and cardiology relevant reports. Updated chart as applicable. I have reviewed the patient's medical history in detail and updated the computerized patient record as relevant.      Updated patient regarding any new or relevant abnormalities on review of records or any new findings on physical exam. Mentioned to patient about purpose of visit and desirable health short and long term goals and objectives.    Primary to monitor CBC CMP Lipid panel and TSH as applicable    ___________________________________________________________________________________________________________________________________________   Procedures    Assessment & Plan   Diagnoses and all orders for this visit:    1. Coronary artery disease of bypass graft of native heart with stable angina pectoris (Primary)    2. S/P CABG (coronary artery bypass graft) x 2 [facility] 07/2021    3. LV dysfunction    4. Chest pain in adult  -     Stress Test With PET Myocardial Perfusion; Future  -     NM PET Cardiac Stress Radiology Interpretation; Future  -     No Caffeine, Decaffeinated Coffee, or Nicotine 12 hrs Prior to Cardiac PET CT Appointment  -     Nothing to Eat or Drink 6 Hours Prior to Cardiac PET CT Appointment  -     No Theophylline or Products Containing Theophylline for 12 Hours prior Cardiac PET CT Appointment  -     No Oral Diabetic Medications After Midnight Prior to Cardiac PET CT Appointment, Hold Half of Insulin Dose Morning of Cardiac PET CT Appointment  -     Insert Peripheral IV; Standing  -     Saline Lock & Maintain IV Access; Standing  -     sodium chloride 0.9 % flush 10 mL  -     sodium chloride 0.9 % flush 10 mL  -     sodium chloride 0.9 % infusion 40 mL          Plan    Off work till next visit   Orders  Placed This Encounter   Procedures    NM PET Cardiac Stress Radiology Interpretation     Standing Status:   Future     Expected Date:   6/30/2025     Expiration Date:   9/27/2026     Reason for Exam::   cp     Release to patient:   Routine Release [[id number]]    No Caffeine, Decaffeinated Coffee, or Nicotine 12 hrs Prior to Cardiac PET CT Appointment    Nothing to Eat or Drink 6 Hours Prior to Cardiac PET CT Appointment    No Theophylline or Products Containing Theophylline for 12 Hours prior Cardiac PET CT Appointment    No Oral Diabetic Medications After Midnight Prior to Cardiac PET CT Appointment, Hold Half of Insulin Dose Morning of Cardiac PET CT Appointment    Stress Test With PET Myocardial Perfusion     Standing Status:   Future     Expected Date:   7/2/2025     Expiration Date:   9/27/2026     Reason for exam?:   Chest Pain     Release to patient:   Routine Release [[id number]]       S/L NTG PRN for chest pain, call me or go to ER if has to use S/L nitroglycerin     Check BP and heart rates twice daily initially till blood pressures and heart rates under good control and then at least 3x / week,   If blood pressures continue to be well-controlled then can check week a month  at home and bring a recording for review next visit  If BP >130/85 or < 100/60 persistently over 3 reading 30 mins apart or if heart rates persistently above 100 bpm or less than 55 bpm call sooner for evaluation and advise     Went to ER records from ER reviewed       Return in about 2 weeks (around 7/11/2025).

## 2025-08-12 ENCOUNTER — TELEPHONE (OUTPATIENT)
Dept: CARDIOLOGY | Facility: CLINIC | Age: 60
End: 2025-08-12
Payer: COMMERCIAL

## 2025-08-12 ENCOUNTER — HOSPITAL ENCOUNTER (OUTPATIENT)
Facility: HOSPITAL | Age: 60
Discharge: HOME | End: 2025-08-12
Payer: COMMERCIAL

## 2025-08-12 DIAGNOSIS — R07.9 CHEST PAIN IN ADULT: ICD-10-CM

## 2025-08-13 ENCOUNTER — HOSPITAL ENCOUNTER (OUTPATIENT)
Facility: HOSPITAL | Age: 60
Discharge: HOME | End: 2025-08-13
Payer: COMMERCIAL

## 2025-08-13 VITALS
WEIGHT: 150 LBS | BODY MASS INDEX: 22.73 KG/M2 | HEART RATE: 63 BPM | DIASTOLIC BLOOD PRESSURE: 73 MMHG | SYSTOLIC BLOOD PRESSURE: 122 MMHG | HEIGHT: 68 IN

## 2025-08-13 LAB
BH CV REST NUCLEAR ISOTOPE DOSE: 17.7 MCI
BH CV STRESS BP STAGE 1: NORMAL
BH CV STRESS COMMENTS STAGE 1: NORMAL
BH CV STRESS DOSE REGADENOSON STAGE 1: 0.4
BH CV STRESS DURATION MIN STAGE 1: 0
BH CV STRESS DURATION SEC STAGE 1: 10
BH CV STRESS HR STAGE 1: 94
BH CV STRESS NUCLEAR ISOTOPE DOSE: 17.7 MCI
BH CV STRESS PROTOCOL 1: NORMAL
BH CV STRESS RECOVERY BP: NORMAL MMHG
BH CV STRESS RECOVERY HR: 68 BPM
BH CV STRESS STAGE 1: 1
MAXIMAL PREDICTED HEART RATE: 160 BPM
PERCENT MAX PREDICTED HR: 58.75 %
SPECT HRT GATED+EF W RNC IV: 58 %
STRESS BASELINE BP: NORMAL MMHG
STRESS BASELINE HR: 63 BPM
STRESS PERCENT HR: 69 %
STRESS POST EXERCISE DUR SEC: 10 SEC
STRESS POST PEAK BP: NORMAL MMHG
STRESS POST PEAK HR: 94 BPM
STRESS TARGET HR: 136 BPM

## 2025-08-13 PROCEDURE — 93017 CV STRESS TEST TRACING ONLY: CPT

## 2025-08-13 PROCEDURE — A9555 RB82 RUBIDIUM: HCPCS | Performed by: INTERNAL MEDICINE

## 2025-08-13 PROCEDURE — 25010000002 REGADENOSON 0.4 MG/5ML SOLUTION: Performed by: INTERNAL MEDICINE

## 2025-08-13 PROCEDURE — 34310000005 RUBIDIUM CHLORIDE: Performed by: INTERNAL MEDICINE

## 2025-08-13 PROCEDURE — 78431 MYOCRD IMG PET RST&STRS CT: CPT

## 2025-08-13 RX ORDER — REGADENOSON 0.08 MG/ML
0.4 INJECTION, SOLUTION INTRAVENOUS ONCE
Status: COMPLETED | OUTPATIENT
Start: 2025-08-13 | End: 2025-08-13

## 2025-08-13 RX ADMIN — REGADENOSON 0.4 MG: 0.08 INJECTION, SOLUTION INTRAVENOUS at 11:58

## 2025-08-25 ENCOUNTER — OFFICE VISIT (OUTPATIENT)
Dept: CARDIOLOGY | Facility: CLINIC | Age: 60
End: 2025-08-25
Payer: COMMERCIAL

## 2025-08-25 VITALS
SYSTOLIC BLOOD PRESSURE: 126 MMHG | HEART RATE: 73 BPM | BODY MASS INDEX: 22.88 KG/M2 | HEIGHT: 68 IN | DIASTOLIC BLOOD PRESSURE: 75 MMHG | WEIGHT: 151 LBS

## 2025-08-25 DIAGNOSIS — R07.9 CHEST PAIN IN ADULT: ICD-10-CM

## 2025-08-25 DIAGNOSIS — Z95.1 S/P CABG (CORONARY ARTERY BYPASS GRAFT): Primary | ICD-10-CM

## 2025-08-25 DIAGNOSIS — I25.708 CORONARY ARTERY DISEASE OF BYPASS GRAFT OF NATIVE HEART WITH STABLE ANGINA PECTORIS: ICD-10-CM

## 2025-08-25 DIAGNOSIS — R06.02 SOB (SHORTNESS OF BREATH): ICD-10-CM

## 2025-08-25 DIAGNOSIS — I51.9 LV DYSFUNCTION: ICD-10-CM

## 2025-08-25 RX ORDER — SODIUM CHLORIDE 9 MG/ML
40 INJECTION, SOLUTION INTRAVENOUS AS NEEDED
OUTPATIENT
Start: 2025-08-25

## 2025-08-25 RX ORDER — SODIUM CHLORIDE 0.9 % (FLUSH) 0.9 %
3 SYRINGE (ML) INJECTION EVERY 12 HOURS SCHEDULED
OUTPATIENT
Start: 2025-08-25

## 2025-08-25 RX ORDER — SODIUM CHLORIDE 0.9 % (FLUSH) 0.9 %
10 SYRINGE (ML) INJECTION AS NEEDED
OUTPATIENT
Start: 2025-08-25

## 2025-08-25 RX ORDER — ISOSORBIDE MONONITRATE 60 MG/1
60 TABLET, EXTENDED RELEASE ORAL DAILY
Qty: 90 TABLET | Refills: 3 | Status: SHIPPED | OUTPATIENT
Start: 2025-08-25

## 2025-08-25 RX ORDER — NITROGLYCERIN 0.4 MG/1
TABLET SUBLINGUAL
Qty: 25 TABLET | Refills: 3 | Status: SHIPPED | OUTPATIENT
Start: 2025-08-25

## (undated) DEVICE — SCREW CORTX SLFTP FTHRD 3.5X18MM
Type: IMPLANTABLE DEVICE | Site: ANKLE | Status: NON-FUNCTIONAL
Removed: 2019-07-15

## (undated) DEVICE — SUTURE VCRL SZ 3-0 L27IN ABSRB UD L26MM SH 1/2 CIR J416H

## (undated) DEVICE — C-ARM: Brand: UNBRANDED

## (undated) DEVICE — Device: Brand: DEFENDO AIR/WATER/SUCTION AND BIOPSY VALVE

## (undated) DEVICE — GLOVE SURG SZ 7 L12IN FNGR THK79MIL GRN LTX FREE

## (undated) DEVICE — SENSR O2 OXIMAX FNGR A/ 18IN NONSTR

## (undated) DEVICE — CONMED SCOPE SAVER BITE BLOCK, 20X27 MM: Brand: SCOPE SAVER

## (undated) DEVICE — CUFF,BP,DISP,1 TUBE,ADULT,HP: Brand: MEDLINE

## (undated) DEVICE — THE CHANNEL CLEANING BRUSH IS A NYLON FLEXI BRUSH ATTACHED TO A FLEXIBLE PLASTIC SHEATH DESIGNED TO SAFELY REMOVE DEBRIS FROM FLEXIBLE ENDOSCOPES.

## (undated) DEVICE — SUTURE ETHLN SZ 3-0 L18IN NONABSORBABLE BLK FS-1 L24MM 3/8 663H

## (undated) DEVICE — YANKAUER,BULB TIP WITH VENT: Brand: ARGYLE

## (undated) DEVICE — SCREW BNE L12MM DIA3.5MM CORT S STL ST LOK FULL THRD
Type: IMPLANTABLE DEVICE | Site: ANKLE | Status: NON-FUNCTIONAL
Removed: 2019-07-15

## (undated) DEVICE — ZIMMER® STERILE DISPOSABLE TOURNIQUET CUFF WITH PLC, DUAL PORT, SINGLE BLADDER, 34 IN. (86 CM)

## (undated) DEVICE — SCREW BNE L12MM DIA4MM CANC S STL ST CANN NONLOCKING FULL
Type: IMPLANTABLE DEVICE | Site: ANKLE | Status: NON-FUNCTIONAL
Removed: 2019-07-15

## (undated) DEVICE — Device

## (undated) DEVICE — STERILE POLYISOPRENE POWDER-FREE SURGICAL GLOVES WITH EMOLLIENT COATING: Brand: PROTEXIS

## (undated) DEVICE — GLOVE SURG SZ 8 L12IN FNGR THK79MIL GRN LTX FREE

## (undated) DEVICE — SURGICAL PROCEDURE PACK LOWER EXTREMITY LOURDES HOSP

## (undated) DEVICE — C-ARMOR C-ARM EQUIPMENT COVERS CLEAR STERILE UNIVERSAL FIT 12 PER CASE: Brand: C-ARMOR

## (undated) DEVICE — GLOVE SURG SZ 75 L12IN FNGR THK94MIL TRNSLUC YEL LTX

## (undated) DEVICE — SYSTEM SKIN CLSR 22CM DERMBND PRINEO

## (undated) DEVICE — SUTURE VCRL SZ 0 L36IN ABSRB UD L36MM CT-1 1/2 CIR J946H

## (undated) DEVICE — SOLUTION IV IRRIG POUR BRL 0.9% SODIUM CHL 2F7124

## (undated) DEVICE — TBG SMPL FLTR LINE NASL 02/C02 A/ BX/100

## (undated) DEVICE — BIT DRL L110MM DIA2.5MM G QUIK CPL W/O STP REUSE

## (undated) DEVICE — BANDAGE COMPR W6INXL10YD ST M E WHITE/BEIGE

## (undated) DEVICE — CHLORAPREP 26ML ORANGE

## (undated) DEVICE — BIT DRL L110MM DIA1.8MM QUIK CPL CALIB W/O STP REUSE

## (undated) DEVICE — BIT DRL L100MM DIA2.4MM QUIK CPL W/O STP REUSE

## (undated) DEVICE — SCREW BNE L20MM DIA2.4MM CORT S STL ST T8 STARDRV RECESS
Type: IMPLANTABLE DEVICE | Site: ANKLE | Status: NON-FUNCTIONAL
Removed: 2019-07-15

## (undated) DEVICE — GLOVE SURG SZ 75 L12IN FNGR THK79MIL GRN LTX FREE

## (undated) DEVICE — BIT DRL L165MM DIA2.8MM QUIK CPL W/O STP REUSE

## (undated) DEVICE — PAD,ABDOMINAL,8"X10",ST,LF: Brand: MEDLINE

## (undated) DEVICE — SCREW BNE L14MM DIA4MM CANC S STL ST CANN NONLOCKING FULL
Type: IMPLANTABLE DEVICE | Site: ANKLE | Status: NON-FUNCTIONAL
Removed: 2019-07-15